# Patient Record
Sex: MALE | Race: WHITE | Employment: FULL TIME | URBAN - METROPOLITAN AREA
[De-identification: names, ages, dates, MRNs, and addresses within clinical notes are randomized per-mention and may not be internally consistent; named-entity substitution may affect disease eponyms.]

---

## 2019-08-06 ENCOUNTER — OFFICE VISIT (OUTPATIENT)
Dept: FAMILY MEDICINE CLINIC | Age: 59
End: 2019-08-06

## 2019-08-06 VITALS
BODY MASS INDEX: 41.75 KG/M2 | HEIGHT: 73 IN | HEART RATE: 79 BPM | DIASTOLIC BLOOD PRESSURE: 77 MMHG | TEMPERATURE: 98.5 F | WEIGHT: 315 LBS | OXYGEN SATURATION: 93 % | SYSTOLIC BLOOD PRESSURE: 130 MMHG | RESPIRATION RATE: 16 BRPM

## 2019-08-06 DIAGNOSIS — I10 ESSENTIAL HYPERTENSION: Primary | ICD-10-CM

## 2019-08-06 DIAGNOSIS — E11.9 DM (DIABETES MELLITUS) (HCC): Chronic | ICD-10-CM

## 2019-08-06 DIAGNOSIS — Z12.11 COLON CANCER SCREENING: ICD-10-CM

## 2019-08-06 RX ORDER — HYDROCHLOROTHIAZIDE 25 MG/1
25 TABLET ORAL DAILY
COMMUNITY

## 2019-08-06 RX ORDER — ASPIRIN 81 MG/1
81 TABLET ORAL DAILY
COMMUNITY
End: 2021-12-05

## 2019-08-06 RX ORDER — INSULIN GLARGINE 100 [IU]/ML
100 INJECTION, SOLUTION SUBCUTANEOUS DAILY
COMMUNITY
Start: 2019-08-06 | End: 2021-11-26

## 2019-08-06 RX ORDER — DILTIAZEM HYDROCHLORIDE EXTENDED-RELEASE TABLETS 240 MG/1
240 TABLET, EXTENDED RELEASE ORAL DAILY
COMMUNITY

## 2019-08-06 RX ORDER — METOPROLOL TARTRATE 50 MG/1
TABLET ORAL 2 TIMES DAILY
COMMUNITY
End: 2020-08-15

## 2019-08-06 RX ORDER — ROSUVASTATIN CALCIUM 20 MG/1
20 TABLET, COATED ORAL
COMMUNITY

## 2019-08-06 NOTE — PATIENT INSTRUCTIONS
A Healthy Lifestyle: Care Instructions Your Care Instructions A healthy lifestyle can help you feel good, stay at a healthy weight, and have plenty of energy for both work and play. A healthy lifestyle is something you can share with your whole family. A healthy lifestyle also can lower your risk for serious health problems, such as high blood pressure, heart disease, and diabetes. You can follow a few steps listed below to improve your health and the health of your family. Follow-up care is a key part of your treatment and safety. Be sure to make and go to all appointments, and call your doctor if you are having problems. It's also a good idea to know your test results and keep a list of the medicines you take. How can you care for yourself at home? · Do not eat too much sugar, fat, or fast foods. You can still have dessert and treats now and then. The goal is moderation. · Start small to improve your eating habits. Pay attention to portion sizes, drink less juice and soda pop, and eat more fruits and vegetables. ? Eat a healthy amount of food. A 3-ounce serving of meat, for example, is about the size of a deck of cards. Fill the rest of your plate with vegetables and whole grains. ? Limit the amount of soda and sports drinks you have every day. Drink more water when you are thirsty. ? Eat at least 5 servings of fruits and vegetables every day. It may seem like a lot, but it is not hard to reach this goal. A serving or helping is 1 piece of fruit, 1 cup of vegetables, or 2 cups of leafy, raw vegetables. Have an apple or some carrot sticks as an afternoon snack instead of a candy bar. Try to have fruits and/or vegetables at every meal. 
· Make exercise part of your daily routine. You may want to start with simple activities, such as walking, bicycling, or slow swimming. Try to be active 30 to 60 minutes every day.  You do not need to do all 30 to 60 minutes all at once. For example, you can exercise 3 times a day for 10 or 20 minutes. Moderate exercise is safe for most people, but it is always a good idea to talk to your doctor before starting an exercise program. 
· Keep moving. Avelina Real the lawn, work in the garden, or [place]. Take the stairs instead of the elevator at work. · If you smoke, quit. People who smoke have an increased risk for heart attack, stroke, cancer, and other lung illnesses. Quitting is hard, but there are ways to boost your chance of quitting tobacco for good. ? Use nicotine gum, patches, or lozenges. ? Ask your doctor about stop-smoking programs and medicines. ? Keep trying. In addition to reducing your risk of diseases in the future, you will notice some benefits soon after you stop using tobacco. If you have shortness of breath or asthma symptoms, they will likely get better within a few weeks after you quit. · Limit how much alcohol you drink. Moderate amounts of alcohol (up to 2 drinks a day for men, 1 drink a day for women) are okay. But drinking too much can lead to liver problems, high blood pressure, and other health problems. Family health If you have a family, there are many things you can do together to improve your health. · Eat meals together as a family as often as possible. · Eat healthy foods. This includes fruits, vegetables, lean meats and dairy, and whole grains. · Include your family in your fitness plan. Most people think of activities such as jogging or tennis as the way to fitness, but there are many ways you and your family can be more active. Anything that makes you breathe hard and gets your heart pumping is exercise. Here are some tips: 
? Walk to do errands or to take your child to school or the bus. 
? Go for a family bike ride after dinner instead of watching TV. Where can you learn more? Go to http://alondra-lionel.info/. Enter O441 in the search box to learn more about \"A Healthy Lifestyle: Care Instructions. \" Current as of: September 11, 2018 Content Version: 12.1 © 9886-8177 Healthwise, Incorporated. Care instructions adapted under license by [employer] (which disclaims liability or warranty for this information). If you have questions about a medical condition or this instruction, always ask your healthcare professional. [place] [address] any warranty or liability for your use of this information.

## 2019-08-06 NOTE — PROGRESS NOTES
Sadi Metz is a 62 y.o. male   Chief Complaint   Patient presents with    New Patient    Establish Care    pt here to [facility] and was seeing Dr Trino Durham. Pt sees endo for type 2 diabetes. Pt also treated for htn and tolerates meds fine. Pt has never had a colonoscopy and after discussion is willing to do this. Pt follows with podiatry q4 months. No complications from diabetes. he is a 62y.o. year old male who presents for evalution. Reviewed PmHx, RxHx, FmHx, SocHx, AllgHx and updated and dated in the chart. Review of Systems - negative except as listed above in the HPI    Objective:     Vitals:    08/06/19 1414   BP: 130/77   Pulse: 79   Resp: 16   Temp: 98.5 °F (36.9 °C)   TempSrc: Oral   SpO2: 93%   Weight: (!) 392 lb (177.8 kg)   Height: 6' 1\" (1.854 m)       Current Outpatient Medications   Medication Sig    dilTIAZem ER (CARDIZEM LA) 240 mg Tb24 tablet Take 240 mg by mouth daily.  metoprolol tartrate (LOPRESSOR) 50 mg tablet Take  by mouth two (2) times a day.  hydroCHLOROthiazide (HYDRODIURIL) 25 mg tablet Take 25 mg by mouth daily.  rosuvastatin (CRESTOR) 20 mg tablet Take 20 mg by mouth nightly.  aspirin delayed-release 81 mg tablet Take  by mouth daily.  insulin glargine (LANTUS,BASAGLAR) 100 unit/mL (3 mL) inpn 76 Units by SubCUTAneous route daily.  metFORMIN (GLUCOPHAGE) 1,000 mg tablet Take 1,000 mg by mouth two (2) times daily (with meals).  NOVOLOG FLEXPEN 100 unit/mL InPn 1 Units by SubCUTAneous route Before breakfast, lunch, and dinner. Sliding scale    Indications: TYPE 2 DIABETES MELLITUS     No current facility-administered medications for this visit.         Physical Examination: General appearance - alert, well appearing, and in no distress  Mental status - alert, oriented to person, place, and time  Chest - clear to auscultation, no wheezes, rales or rhonchi, symmetric air entry  Heart - normal rate, regular rhythm, normal S1, S2, no murmurs, rubs, clicks or gallops  Abdomen - soft, nontender, nondistended, no masses or organomegaly      Assessment/ Plan:   Diagnoses and all orders for this visit:    1. Essential hypertension  @ goal  2. DM (diabetes mellitus) ([facility] [address]  -     LIPID PANEL  -     METABOLIC PANEL, COMPREHENSIVE  -     HEMOGLOBIN A1C WITH EAG  -     MICROALBUMIN, UR, RAND W/ MICROALB/CREAT RATIO    3. Colon cancer screening  -     REFERRAL TO GASTROENTEROLOGY       Follow-up and Dispositions    · Return if symptoms worsen or fail to improve. I have discussed the diagnosis with the patient and the intended plan as seen in the above orders. The patient has received an after-visit summary and questions were answered concerning future plans. Pt conveyed understanding of plan.     Medication Side Effects and Warnings were discussed with patient      [address] [facility] [address], DO

## 2019-08-06 NOTE — PROGRESS NOTES
Chief Complaint   Patient presents with    New Patient   [address]     Patient presents in office today as a NP to establish care. No concerns.       Learning Assessment 8/6/2019   PRIMARY LEARNER Patient   PRIMARY LANGUAGE ENGLISH   LEARNER PREFERENCE PRIMARY LISTENING   ANSWERED BY self   RELATIONSHIP SELF

## 2019-08-08 LAB
ALBUMIN SERPL-MCNC: 4 G/DL (ref 3.5–5.5)
ALBUMIN/CREAT UR: <3.2 MG/G CREAT (ref 0–30)
ALBUMIN/GLOB SERPL: 1.4 {RATIO} (ref 1.2–2.2)
ALP SERPL-CCNC: 68 IU/L (ref 39–117)
ALT SERPL-CCNC: 24 IU/L (ref 0–44)
AST SERPL-CCNC: 19 IU/L (ref 0–40)
BILIRUB SERPL-MCNC: 0.9 MG/DL (ref 0–1.2)
BUN SERPL-MCNC: 15 MG/DL (ref 6–24)
BUN/CREAT SERPL: 15 (ref 9–20)
CALCIUM SERPL-MCNC: 8.8 MG/DL (ref 8.7–10.2)
CHLORIDE SERPL-SCNC: 100 MMOL/L (ref 96–106)
CHOLEST SERPL-MCNC: 131 MG/DL (ref 100–199)
CO2 SERPL-SCNC: 23 MMOL/L (ref 20–29)
CREAT SERPL-MCNC: 0.98 MG/DL (ref 0.76–1.27)
CREAT UR-MCNC: 94.7 MG/DL
EST. AVERAGE GLUCOSE BLD GHB EST-MCNC: 160 MG/DL
GLOBULIN SER CALC-MCNC: 2.8 G/DL (ref 1.5–4.5)
GLUCOSE SERPL-MCNC: 169 MG/DL (ref 65–99)
HBA1C MFR BLD: 7.2 % (ref 4.8–5.6)
HDLC SERPL-MCNC: 40 MG/DL
INTERPRETATION, 910389: NORMAL
LDLC SERPL CALC-MCNC: 48 MG/DL (ref 0–99)
Lab: NORMAL
MICROALBUMIN UR-MCNC: <3 UG/ML
POTASSIUM SERPL-SCNC: 3.8 MMOL/L (ref 3.5–5.2)
PROT SERPL-MCNC: 6.8 G/DL (ref 6–8.5)
SODIUM SERPL-SCNC: 140 MMOL/L (ref 134–144)
TRIGL SERPL-MCNC: 214 MG/DL (ref 0–149)
VLDLC SERPL CALC-MCNC: 43 MG/DL (ref 5–40)

## 2019-08-14 ENCOUNTER — DOCUMENTATION ONLY (OUTPATIENT)
Dept: FAMILY MEDICINE CLINIC | Age: 59
End: 2019-08-14

## 2019-08-14 NOTE — PROGRESS NOTES
Faxed 08/06/19 office note/lab results to Dr Jose Cordova per [facility] request. Fax #[phone] confirmation received.

## 2020-05-30 ENCOUNTER — APPOINTMENT (OUTPATIENT)
Dept: VASCULAR SURGERY | Age: 60
End: 2020-05-30
Attending: EMERGENCY MEDICINE
Payer: COMMERCIAL

## 2020-05-30 ENCOUNTER — HOSPITAL ENCOUNTER (EMERGENCY)
Age: 60
Discharge: HOME | End: 2020-05-30
Attending: EMERGENCY MEDICINE
Payer: COMMERCIAL

## 2020-05-30 VITALS
HEART RATE: 82 BPM | WEIGHT: 315 LBS | OXYGEN SATURATION: 96 % | HEIGHT: 74 IN | TEMPERATURE: 98.3 F | RESPIRATION RATE: 20 BRPM | DIASTOLIC BLOOD PRESSURE: 76 MMHG | SYSTOLIC BLOOD PRESSURE: 152 MMHG | BODY MASS INDEX: 40.43 KG/M2

## 2020-05-30 DIAGNOSIS — I82.4Z1 ACUTE DEEP VEIN THROMBOSIS (DVT) OF DISTAL VEIN OF RIGHT LOWER EXTREMITY (HCC): ICD-10-CM

## 2020-05-30 DIAGNOSIS — I82.4Y1 ACUTE DEEP VEIN THROMBOSIS (DVT) OF PROXIMAL VEIN OF RIGHT LOWER EXTREMITY (HCC): Primary | ICD-10-CM

## 2020-05-30 PROCEDURE — 93971 EXTREMITY STUDY: CPT

## 2020-05-30 PROCEDURE — 99282 EMERGENCY DEPT VISIT SF MDM: CPT

## 2020-05-30 RX ORDER — APIXABAN 5 MG (74)
KIT ORAL
Qty: 1 DOSE PACK | Refills: 0 | Status: SHIPPED | OUTPATIENT
Start: 2020-05-30 | End: 2020-06-29 | Stop reason: DRUGHIGH

## 2020-05-30 NOTE — DISCHARGE INSTRUCTIONS
Patient Education        Learning About Deep Vein Thrombosis  What is a deep vein thrombosis (DVT)? A deep vein thrombosis (DVT) is a blood clot (thrombus) in a deep vein, usually in the legs. A DVT can be dangerous because it can break loose and travel through the bloodstream to the lungs. There it can block blood flow in the lungs (pulmonary embolism). This can be life-threatening. What are the symptoms? Deep vein thrombosis often doesn't cause symptoms. Or it may cause only minor ones. When symptoms happen, they include:  · Swelling in the affected area. · Redness and warmth in the affected area. · Pain or tenderness. You may have pain only when you touch the affected area or when you stand or walk. Sometimes a pulmonary embolism is the first sign that you have DVT. If your doctor thinks you may have DVT, you will probably have an ultrasound test. You may have other tests as well. What causes deep vein thrombosis (DVT)? Causes of a blood clot in a deep vein (DVT) include:  · Slowed blood flow. This can happen when you're not active for long periods of time. For example, clots can form if you are paralyzed, are confined to bed, or must sit while on a long flight or car trip. · Abnormal clotting problems that make the blood clot too easily or too quickly. For example, some people have blood that clots too easily, a problem that may run in families. · Surgery or an injury to the blood vessels. Blood is more likely to clot in veins shortly after they are injured. · Cancer. How can you prevent DVT? · Exercise your lower leg muscles to help blood flow in your legs. Point your toes up toward your head so the calves of your legs are stretched, then relax and repeat. This is a good exercise to do when you are sitting for long periods of time. · Get out of bed as soon as you can after an illness or surgery. If you need to stay in bed, do the leg exercise noted above every hour when you are awake.   · Use special stockings called compression stockings. These stockings are tight at the feet with a gradually looser fit on the leg. Many doctors recommend that you wear compression stockings during a journey longer than 8 hours. · Take breaks when you are on long trips. Stop the car and walk around. On long airplane flights, walk up and down the aisle hourly, and flex and point your feet every 20 minutes while sitting. · Take blood-thinning medicines after some types of surgery if your doctor recommends it. Blood thinners also may be used if you are likely to develop clots. How is DVT treated? Treatment for DVT usually involves taking blood thinners. These medicines are given through a vein (intravenously, or IV) or as a pill. Talk with your doctor about which medicine is right for you. Your doctor also may suggest that you prop up or elevate your leg when possible, take walks, and wear compression stockings. These measures may help reduce the pain and swelling that can happen with DVT. Follow-up care is a key part of your treatment and safety. Be sure to make and go to all appointments, and call your doctor if you are having problems. It's also a good idea to know your test results and keep a list of the medicines you take. Where can you learn more? Go to http://alondra-lionel.info/  Enter X941 in the search box to learn more about \"Learning About Deep Vein Thrombosis. \"  Current as of: March 4, 2020               Content Version: 12.5  © 3316-1867 Healthwise, Incorporated. Care instructions adapted under license by [employer] (which disclaims liability or warranty for this information). If you have questions about a medical condition or this instruction, always ask your healthcare professional. [place] [address] any warranty or liability for your use of this information.

## 2020-05-31 NOTE — ED PROVIDER NOTES
62 y/o male with pmhx of diabetes, HLD, HTN presents with pain behind his right knee x 3 days. Pt notes that he usually wears compression stockings and has not for a few days, so he thinks the swelling of his leg is baseline. Pain is worse with knee flexion and walking. Tylenol helps some. Denies redness or heat of leg or knee. Denies recent trauma or falls. Denies fever, chills, chest pain, shortness of breath. No recent immobilization or surgery. Denies known arthritis.            Past Medical History:   Diagnosis Date    AR (allergic rhinitis) 2009    Diabetes ([facility] [address]     Dr. Elli Qureshi    Hypercholesterolemia     Hypertension        Past Surgical History:   Procedure Laterality Date    HX HEART CATHETERIZATION      normal         Family History:   Problem Relation Age of Onset    Heart Failure Father     Diabetes Brother     Heart Failure Paternal Grandfather          age 37 AMI       Social History     Socioeconomic History    Marital status:      Spouse name: Not on file    Number of children: Not on file    Years of education: Not on file    Highest education level: Not on file   Occupational History    Occupation:    Social Needs    Financial resource strain: Not on file    Food insecurity     Worry: Not on file     Inability: Not on file    Transportation needs     Medical: Not on file     Non-medical: Not on file   Tobacco Use    Smoking status: Former Smoker     Packs/day: 1.00     Years: 15.00     Pack years: 15.00     Types: Cigarettes, Pipe, Cigars     Last attempt to quit: 3/12/1991     Years since quittin.2    Smokeless tobacco: Never Used   Substance and Sexual Activity    Alcohol use: No    Drug use: No    Sexual activity: Not on file   Lifestyle    Physical activity     Days per week: Not on file     Minutes per session: Not on file    Stress: Not on file   Relationships    Social connections     Talks on phone: Not on file     Gets together: Not on file     Attends [employer]ism service: Not on file     Active member of club or organization: Not on file     Attends meetings of clubs or organizations: Not on file     Relationship status: Not on file    Intimate partner violence     Fear of current or ex partner: Not on file     Emotionally abused: Not on file     Physically abused: Not on file     Forced sexual activity: Not on file   Other Topics Concern     Service Not Asked    Blood Transfusions Not Asked    Caffeine Concern Not Asked    Occupational Exposure Not Asked   [place] Hazards Not Asked    Sleep Concern Not Asked    Stress Concern Not Asked    Weight Concern Not Asked    Special Diet Not Asked    Back Care Not Asked    Exercise No    Bike Helmet Not Asked    Seat Belt Not Asked    Self-Exams Not Asked   Social History Narrative    Not on file         ALLERGIES: Patient has no known allergies. Review of Systems   Constitutional: Negative for fever. HENT: Negative for congestion and sore throat. Respiratory: Negative for cough, chest tightness and shortness of breath. Cardiovascular: Positive for leg swelling (right leg). Negative for chest pain. Gastrointestinal: Negative for nausea and vomiting. Genitourinary: Negative for dysuria. Musculoskeletal: Positive for arthralgias (back of right knee). Negative for gait problem and myalgias. Skin: Negative for rash. Neurological: Negative for dizziness and weakness. Vitals:    05/30/20 1356   BP: 152/76   Pulse: 82   Resp: 20   Temp: 98.3 °F (36.8 °C)   SpO2: 96%   Weight: 158.8 kg (350 lb)   Height: 6' 2\" (1.88 m)            Physical Exam  Vitals signs and nursing note reviewed. Constitutional:       General: He is not in acute distress. Appearance: He is not ill-appearing. HENT:      Head: Normocephalic. Nose: No rhinorrhea. Mouth/Throat:      Mouth: Mucous membranes are moist.      Pharynx: Oropharynx is clear.  No posterior oropharyngeal erythema. Eyes:      Pupils: Pupils are equal, round, and reactive to light. Neck:      Musculoskeletal: Normal range of motion. Cardiovascular:      Rate and Rhythm: Normal rate and regular rhythm. Heart sounds: Normal heart sounds. Pulmonary:      Effort: Pulmonary effort is normal.      Breath sounds: Normal breath sounds. No wheezing. Abdominal:      General: There is no distension. Palpations: Abdomen is soft. Musculoskeletal:      Right knee: He exhibits no effusion, no ecchymosis and no deformity. No tenderness found. No medial joint line, no lateral joint line, no MCL, no LCL and no patellar tendon tenderness noted. Right lower le+ Edema present. Left lower le+ Edema present. Skin:     General: Skin is warm. Capillary Refill: Capillary refill takes less than 2 seconds. Findings: No erythema or rash. Neurological:      Mental Status: He is alert. Psychiatric:         Mood and Affect: Mood normal.         Behavior: Behavior normal.      Labs Reviewed - No data to display  No orders to display     Medications - No data to display    US right leg - DVT of proximal veins without good visualization of distal veins    MDM        62 y/o male with pain of posterior right knee for 3 days. Differential DVT, bakers cyst, arthritis. US identifies DVT. No signs or symptoms of PE with stable vitals. Eliquis prescribed. Discussed prompt follow-up with PCP for monitoring of condition and further anticoagulation. Discussed strict indications for return to ED such as chest pain, shortness of breath, syncope, presyncope, worsening pain, fever. Case reviewed with attending physician, Dr. Isa Edmond.     Procedures    Jeanne Perez PA-C  2020

## 2020-06-04 ENCOUNTER — TELEPHONE (OUTPATIENT)
Dept: FAMILY MEDICINE CLINIC | Age: 60
End: 2020-06-04

## 2020-06-04 NOTE — TELEPHONE ENCOUNTER
Wife Alysa Carlos on hippa is real concern about patient. He had a previous virtual appt but cell phone would not work right. He was seen at [facility] ER for a blood clot & now on eliquis. Patient has a swollen calf with discoloration. She states that patient is very stubborn but she wants to know what to do. No virtual appt available. They have plans for beach vacation next week. Do you want him to go through flu clinic this afternoon?  Please call Alysa Carlos

## 2020-06-04 NOTE — TELEPHONE ENCOUNTER
Called and spoke with wife. She states that patient had multiple blood clots in his right calf and has been taking Eliquis and using his compression stocking. She reports that his right calf is still very swollen and painful and warm to the touch. Also reports red streaks. Advised wife that he should go to the ED for repeat imaging. Wife verbalized understanding.

## 2020-06-29 ENCOUNTER — TELEPHONE (OUTPATIENT)
Dept: FAMILY MEDICINE CLINIC | Age: 60
End: 2020-06-29

## 2020-06-29 NOTE — TELEPHONE ENCOUNTER
Wife Cassia Power states that patient took his last Eliquis pill this morning. He takes 1 in the morning & 1 @ night now. Can you send in a rx & he will make a virtual appt with Dr Crystal Hinojosa next week?  Let her know so she can  rx

## 2020-07-07 ENCOUNTER — VIRTUAL VISIT (OUTPATIENT)
Dept: FAMILY MEDICINE CLINIC | Age: 60
End: 2020-07-07

## 2020-07-07 DIAGNOSIS — I82.411 ACUTE DEEP VEIN THROMBOSIS (DVT) OF FEMORAL VEIN OF RIGHT LOWER EXTREMITY (HCC): Primary | ICD-10-CM

## 2020-07-07 NOTE — PROGRESS NOTES
Progress Note    he is a 61y.o. year old male who presents for evalution. Subjective:     Pt for follow up from ED in May, states he got a cram pin his calf and would not go away and wife looked at it and was red and hot. Was dx with DVT proximal femoral vein on R side. Pt thinks maybe his compression hose may have contributed but unlikely. He denies recent travel but works in a mostly sedentary job at a computer. Pt states he is taking the eliquis bid but is getting diarrhea. Pt wants to remain on this for now discussed we could switch to xarelto. Pt would like to have order for repeat doppler for October. No sob  Reviewed PmHx, RxHx, FmHx, SocHx, AllgHx and updated and dated in the chart. Review of Systems - negative except as listed above in the HPI    Objective: There were no vitals filed for this visit. Current Outpatient Medications   Medication Sig    apixaban (ELIQUIS) 5 mg tablet Take 1 Tab by mouth two (2) times a day.  dilTIAZem ER (CARDIZEM LA) 240 mg Tb24 tablet Take 240 mg by mouth daily.  metoprolol tartrate (LOPRESSOR) 50 mg tablet Take  by mouth two (2) times a day.  hydroCHLOROthiazide (HYDRODIURIL) 25 mg tablet Take 25 mg by mouth daily.  rosuvastatin (CRESTOR) 20 mg tablet Take 20 mg by mouth nightly.  aspirin delayed-release 81 mg tablet Take  by mouth daily.  insulin glargine (LANTUS,BASAGLAR) 100 unit/mL (3 mL) inpn 76 Units by SubCUTAneous route daily.  metFORMIN (GLUCOPHAGE) 1,000 mg tablet Take 1,000 mg by mouth two (2) times daily (with meals).  NOVOLOG FLEXPEN 100 unit/mL InPn 1 Units by SubCUTAneous route Before breakfast, lunch, and dinner. Sliding scale    Indications: TYPE 2 DIABETES MELLITUS     No current facility-administered medications for this visit.         Physical Examination: General appearance - alert, well appearing, and in no distress  Mental status - alert, oriented to person, place, and time  Chest - gilmar resp effort      Assessment/ Plan:   Diagnoses and all orders for this visit:    1. Acute deep vein thrombosis (DVT) of femoral vein of right lower extremity (HCC)  -     DUPLEX LOWER EXT VENOUS RIGHT; Future       Follow-up and Dispositions    · Return if symptoms worsen or fail to improve. I have discussed the diagnosis with the patient and the intended plan as seen in the above orders. The patient has received an after-visit summary and questions were answered concerning future plans. Pt conveyed understanding of plan. Medication Side Effects and Warnings were discussed with patient      Rashida España DO         Rj Atkinson is a 61 y.o. male being evaluated by a Virtual Visit (video visit) encounter to address concerns as mentioned above. A caregiver was present when appropriate. Due to this being a TeleHealth encounter (During XIZ-12 public health emergency), evaluation of the following organ systems was limited: Vitals/Constitutional/EENT/Resp/CV/GI//MS/Neuro/Skin/Heme-Lymph-Imm. Pursuant to the emergency declaration under the [address] [place] [address], [address] [place] [address] authority and the [employer] Act, this Virtual Visit was conducted with patient's (and/or legal guardian's) consent, to reduce the risk of exposure to COVID-19 and provide necessary medical care. Services were provided through a video synchronous discussion virtually to substitute for in-person encounter. --Rashida España DO on 7/7/2020 at 2:09 PM    An electronic signature was used to authenticate this note.

## 2020-07-07 NOTE — PATIENT INSTRUCTIONS
A Healthy Lifestyle: Care Instructions Your Care Instructions A healthy lifestyle can help you feel good, stay at a healthy weight, and have plenty of energy for both work and play. A healthy lifestyle is something you can share with your whole family. A healthy lifestyle also can lower your risk for serious health problems, such as high blood pressure, heart disease, and diabetes. You can follow a few steps listed below to improve your health and the health of your family. Follow-up care is a key part of your treatment and safety. Be sure to make and go to all appointments, and call your doctor if you are having problems. It's also a good idea to know your test results and keep a list of the medicines you take. How can you care for yourself at home? · Do not eat too much sugar, fat, or fast foods. You can still have dessert and treats now and then. The goal is moderation. · Start small to improve your eating habits. Pay attention to portion sizes, drink less juice and soda pop, and eat more fruits and vegetables. ? Eat a healthy amount of food. A 3-ounce serving of meat, for example, is about the size of a deck of cards. Fill the rest of your plate with vegetables and whole grains. ? Limit the amount of soda and sports drinks you have every day. Drink more water when you are thirsty. ? Eat at least 5 servings of fruits and vegetables every day. It may seem like a lot, but it is not hard to reach this goal. A serving or helping is 1 piece of fruit, 1 cup of vegetables, or 2 cups of leafy, raw vegetables. Have an apple or some carrot sticks as an afternoon snack instead of a candy bar. Try to have fruits and/or vegetables at every meal. 
· Make exercise part of your daily routine. You may want to start with simple activities, such as walking, bicycling, or slow swimming. Try to be active 30 to 60 minutes every day.  You do not need to do all 30 to 60 minutes all at once. For example, you can exercise 3 times a day for 10 or 20 minutes. Moderate exercise is safe for most people, but it is always a good idea to talk to your doctor before starting an exercise program. 
· Keep moving. [employer] the lawn, work in the garden, or [employer]. Take the stairs instead of the elevator at work. · If you smoke, quit. People who smoke have an increased risk for heart attack, stroke, cancer, and other lung illnesses. Quitting is hard, but there are ways to boost your chance of quitting tobacco for good. ? Use nicotine gum, patches, or lozenges. ? Ask your doctor about stop-smoking programs and medicines. ? Keep trying. In addition to reducing your risk of diseases in the future, you will notice some benefits soon after you stop using tobacco. If you have shortness of breath or asthma symptoms, they will likely get better within a few weeks after you quit. · Limit how much alcohol you drink. Moderate amounts of alcohol (up to 2 drinks a day for men, 1 drink a day for women) are okay. But drinking too much can lead to liver problems, high blood pressure, and other health problems. Family health If you have a family, there are many things you can do together to improve your health. · Eat meals together as a family as often as possible. · Eat healthy foods. This includes fruits, vegetables, lean meats and dairy, and whole grains. · Include your family in your fitness plan. Most people think of activities such as jogging or tennis as the way to fitness, but there are many ways you and your family can be more active. Anything that makes you breathe hard and gets your heart pumping is exercise. Here are some tips: 
? Walk to do errands or to take your child to school or the bus. 
? Go for a family bike ride after dinner instead of watching TV. Where can you learn more? Go to http://alondra-lionel.info/ Enter I245 in the search box to learn more about \"A Healthy Lifestyle: Care Instructions. \" Current as of: January 31, 2020               Content Version: 12.5 © 4738-1249 [employer]. Care instructions adapted under license by [employer] (which disclaims liability or warranty for this information). If you have questions about a medical condition or this instruction, always ask your healthcare professional. [place] [address] any warranty or liability for your use of this information.

## 2020-08-15 ENCOUNTER — APPOINTMENT (OUTPATIENT)
Dept: CT IMAGING | Age: 60
DRG: 872 | End: 2020-08-15
Attending: EMERGENCY MEDICINE
Payer: COMMERCIAL

## 2020-08-15 ENCOUNTER — HOSPITAL ENCOUNTER (INPATIENT)
Age: 60
LOS: 3 days | Discharge: HOME | DRG: 872 | End: 2020-08-18
Attending: EMERGENCY MEDICINE | Admitting: FAMILY MEDICINE
Payer: COMMERCIAL

## 2020-08-15 ENCOUNTER — APPOINTMENT (OUTPATIENT)
Dept: GENERAL RADIOLOGY | Age: 60
DRG: 872 | End: 2020-08-15
Attending: EMERGENCY MEDICINE
Payer: COMMERCIAL

## 2020-08-15 DIAGNOSIS — R78.81 POSITIVE BLOOD CULTURE: ICD-10-CM

## 2020-08-15 DIAGNOSIS — E78.5 HYPERLIPIDEMIA, UNSPECIFIED HYPERLIPIDEMIA TYPE: Chronic | ICD-10-CM

## 2020-08-15 DIAGNOSIS — Z79.4 TYPE 2 DIABETES MELLITUS WITHOUT COMPLICATION, WITH LONG-TERM CURRENT USE OF INSULIN (HCC): Chronic | ICD-10-CM

## 2020-08-15 DIAGNOSIS — E87.20 LACTIC ACIDOSIS: ICD-10-CM

## 2020-08-15 DIAGNOSIS — R50.9 FEVER, UNSPECIFIED FEVER CAUSE: Primary | ICD-10-CM

## 2020-08-15 DIAGNOSIS — I10 ESSENTIAL HYPERTENSION: Chronic | ICD-10-CM

## 2020-08-15 DIAGNOSIS — A41.9 SEVERE SEPSIS (HCC): ICD-10-CM

## 2020-08-15 DIAGNOSIS — R65.10 SIRS (SYSTEMIC INFLAMMATORY RESPONSE SYNDROME) (HCC): ICD-10-CM

## 2020-08-15 DIAGNOSIS — R78.81 STREPTOCOCCAL BACTEREMIA: ICD-10-CM

## 2020-08-15 DIAGNOSIS — B95.5 STREPTOCOCCAL BACTEREMIA: ICD-10-CM

## 2020-08-15 DIAGNOSIS — I82.511 CHRONIC DEEP VEIN THROMBOSIS (DVT) OF FEMORAL VEIN OF RIGHT LOWER EXTREMITY (HCC): ICD-10-CM

## 2020-08-15 DIAGNOSIS — E11.9 TYPE 2 DIABETES MELLITUS WITHOUT COMPLICATION, WITH LONG-TERM CURRENT USE OF INSULIN (HCC): Chronic | ICD-10-CM

## 2020-08-15 DIAGNOSIS — R65.20 SEVERE SEPSIS (HCC): ICD-10-CM

## 2020-08-15 DIAGNOSIS — L03.116 CELLULITIS OF LEFT LOWER EXTREMITY: ICD-10-CM

## 2020-08-15 LAB
ALBUMIN SERPL-MCNC: 3.5 G/DL (ref 3.5–5)
ALBUMIN/GLOB SERPL: 0.9 {RATIO} (ref 1.1–2.2)
ALP SERPL-CCNC: 65 U/L (ref 45–117)
ALT SERPL-CCNC: 26 U/L (ref 12–78)
ANION GAP SERPL CALC-SCNC: 11 MMOL/L (ref 5–15)
APPEARANCE UR: CLEAR
AST SERPL-CCNC: 10 U/L (ref 15–37)
B PERT DNA SPEC QL NAA+PROBE: NOT DETECTED
BACTERIA URNS QL MICRO: NEGATIVE /HPF
BASOPHILS # BLD: 0 K/UL (ref 0–0.1)
BASOPHILS NFR BLD: 0 % (ref 0–1)
BILIRUB SERPL-MCNC: 2.4 MG/DL (ref 0.2–1)
BILIRUB UR QL: NEGATIVE
BORDETELLA PARAPERTUSSIS PCR, BORPAR: NOT DETECTED
BUN SERPL-MCNC: 19 MG/DL (ref 6–20)
BUN/CREAT SERPL: 13 (ref 12–20)
C PNEUM DNA SPEC QL NAA+PROBE: NOT DETECTED
CALCIUM SERPL-MCNC: 8.5 MG/DL (ref 8.5–10.1)
CHLORIDE SERPL-SCNC: 97 MMOL/L (ref 97–108)
CHOLEST SERPL-MCNC: 112 MG/DL
CO2 SERPL-SCNC: 23 MMOL/L (ref 21–32)
COLOR UR: ABNORMAL
COMMENT, HOLDF: NORMAL
COVID-19 RAPID TEST, COVR: ABNORMAL
COVID-19 RAPID TEST, COVR: NOT DETECTED
CREAT SERPL-MCNC: 1.42 MG/DL (ref 0.7–1.3)
CRP SERPL-MCNC: 8.47 MG/DL (ref 0–0.6)
D DIMER PPP FEU-MCNC: 0.37 MG/L FEU (ref 0–0.65)
DIFFERENTIAL METHOD BLD: ABNORMAL
EOSINOPHIL # BLD: 0 K/UL (ref 0–0.4)
EOSINOPHIL NFR BLD: 0 % (ref 0–7)
EPITH CASTS URNS QL MICRO: ABNORMAL /LPF
ERYTHROCYTE [DISTWIDTH] IN BLOOD BY AUTOMATED COUNT: 15.7 % (ref 11.5–14.5)
EST. AVERAGE GLUCOSE BLD GHB EST-MCNC: 174 MG/DL
FERRITIN SERPL-MCNC: 74 NG/ML (ref 26–388)
FLUAV H1 2009 PAND RNA SPEC QL NAA+PROBE: NOT DETECTED
FLUAV H1 RNA SPEC QL NAA+PROBE: NOT DETECTED
FLUAV H3 RNA SPEC QL NAA+PROBE: NOT DETECTED
FLUAV SUBTYP SPEC NAA+PROBE: NOT DETECTED
FLUBV RNA SPEC QL NAA+PROBE: NOT DETECTED
GLOBULIN SER CALC-MCNC: 3.8 G/DL (ref 2–4)
GLUCOSE BLD STRIP.AUTO-MCNC: 130 MG/DL (ref 65–100)
GLUCOSE BLD STRIP.AUTO-MCNC: 268 MG/DL (ref 65–100)
GLUCOSE BLD STRIP.AUTO-MCNC: 352 MG/DL (ref 65–100)
GLUCOSE BLD STRIP.AUTO-MCNC: 400 MG/DL (ref 65–100)
GLUCOSE BLD STRIP.AUTO-MCNC: 409 MG/DL (ref 65–100)
GLUCOSE SERPL-MCNC: 367 MG/DL (ref 65–100)
GLUCOSE UR STRIP.AUTO-MCNC: >1000 MG/DL
HADV DNA SPEC QL NAA+PROBE: NOT DETECTED
HBA1C MFR BLD: 7.7 % (ref 4–5.6)
HCOV 229E RNA SPEC QL NAA+PROBE: NOT DETECTED
HCOV HKU1 RNA SPEC QL NAA+PROBE: NOT DETECTED
HCOV NL63 RNA SPEC QL NAA+PROBE: NOT DETECTED
HCOV OC43 RNA SPEC QL NAA+PROBE: NOT DETECTED
HCT VFR BLD AUTO: 40.2 % (ref 36.6–50.3)
HDLC SERPL-MCNC: 43 MG/DL
HDLC SERPL: 2.6 {RATIO} (ref 0–5)
HEALTH STATUS, XMCV2T: ABNORMAL
HEALTH STATUS, XMCV2T: NORMAL
HGB BLD-MCNC: 13.5 G/DL (ref 12.1–17)
HGB UR QL STRIP: ABNORMAL
HMPV RNA SPEC QL NAA+PROBE: NOT DETECTED
HPIV1 RNA SPEC QL NAA+PROBE: NOT DETECTED
HPIV2 RNA SPEC QL NAA+PROBE: NOT DETECTED
HPIV3 RNA SPEC QL NAA+PROBE: NOT DETECTED
HPIV4 RNA SPEC QL NAA+PROBE: NOT DETECTED
HYALINE CASTS URNS QL MICRO: ABNORMAL /LPF (ref 0–5)
IMM GRANULOCYTES # BLD AUTO: 0.1 K/UL (ref 0–0.04)
IMM GRANULOCYTES NFR BLD AUTO: 0 % (ref 0–0.5)
KETONES UR QL STRIP.AUTO: 80 MG/DL
LACTATE SERPL-SCNC: 2.7 MMOL/L (ref 0.4–2)
LACTATE SERPL-SCNC: 2.8 MMOL/L (ref 0.4–2)
LACTATE SERPL-SCNC: 3 MMOL/L (ref 0.4–2)
LACTATE SERPL-SCNC: 3.6 MMOL/L (ref 0.4–2)
LDH SERPL L TO P-CCNC: 202 U/L (ref 85–241)
LDLC SERPL CALC-MCNC: 34.6 MG/DL (ref 0–100)
LEUKOCYTE ESTERASE UR QL STRIP.AUTO: NEGATIVE
LIPID PROFILE,FLP: ABNORMAL
LYMPHOCYTES # BLD: 0.9 K/UL (ref 0.8–3.5)
LYMPHOCYTES NFR BLD: 5 % (ref 12–49)
M PNEUMO DNA SPEC QL NAA+PROBE: NOT DETECTED
MAGNESIUM SERPL-MCNC: 2 MG/DL (ref 1.6–2.4)
MCH RBC QN AUTO: 28.7 PG (ref 26–34)
MCHC RBC AUTO-ENTMCNC: 33.6 G/DL (ref 30–36.5)
MCV RBC AUTO: 85.5 FL (ref 80–99)
MONOCYTES # BLD: 1.3 K/UL (ref 0–1)
MONOCYTES NFR BLD: 8 % (ref 5–13)
NEUTS SEG # BLD: 14.7 K/UL (ref 1.8–8)
NEUTS SEG NFR BLD: 87 % (ref 32–75)
NITRITE UR QL STRIP.AUTO: NEGATIVE
NRBC # BLD: 0 K/UL (ref 0–0.01)
NRBC BLD-RTO: 0 PER 100 WBC
PH UR STRIP: 5.5 [PH] (ref 5–8)
PLATELET # BLD AUTO: 209 K/UL (ref 150–400)
PMV BLD AUTO: 10.5 FL (ref 8.9–12.9)
POTASSIUM SERPL-SCNC: 3.7 MMOL/L (ref 3.5–5.1)
PROT SERPL-MCNC: 7.3 G/DL (ref 6.4–8.2)
PROT UR STRIP-MCNC: NEGATIVE MG/DL
RBC # BLD AUTO: 4.7 M/UL (ref 4.1–5.7)
RBC #/AREA URNS HPF: ABNORMAL /HPF (ref 0–5)
RSV RNA SPEC QL NAA+PROBE: NOT DETECTED
RV+EV RNA SPEC QL NAA+PROBE: NOT DETECTED
SAMPLES BEING HELD,HOLD: NORMAL
SERVICE CMNT-IMP: ABNORMAL
SODIUM SERPL-SCNC: 131 MMOL/L (ref 136–145)
SOURCE, COVRS: ABNORMAL
SOURCE, COVRS: NORMAL
SP GR UR REFRACTOMETRY: 1.02 (ref 1–1.03)
SPECIMEN SOURCE, FCOV2M: ABNORMAL
SPECIMEN SOURCE, FCOV2M: NORMAL
SPECIMEN TYPE, XMCV1T: ABNORMAL
SPECIMEN TYPE, XMCV1T: NORMAL
TRIGL SERPL-MCNC: 172 MG/DL (ref ?–150)
UA: UC IF INDICATED,UAUC: ABNORMAL
UROBILINOGEN UR QL STRIP.AUTO: 0.2 EU/DL (ref 0.2–1)
VLDLC SERPL CALC-MCNC: 34.4 MG/DL
WBC # BLD AUTO: 16.9 K/UL (ref 4.1–11.1)
WBC URNS QL MICRO: ABNORMAL /HPF (ref 0–4)

## 2020-08-15 PROCEDURE — 74011636637 HC RX REV CODE- 636/637: Performed by: STUDENT IN AN ORGANIZED HEALTH CARE EDUCATION/TRAINING PROGRAM

## 2020-08-15 PROCEDURE — 96365 THER/PROPH/DIAG IV INF INIT: CPT

## 2020-08-15 PROCEDURE — 81001 URINALYSIS AUTO W/SCOPE: CPT

## 2020-08-15 PROCEDURE — 36415 COLL VENOUS BLD VENIPUNCTURE: CPT

## 2020-08-15 PROCEDURE — 83605 ASSAY OF LACTIC ACID: CPT

## 2020-08-15 PROCEDURE — 71045 X-RAY EXAM CHEST 1 VIEW: CPT

## 2020-08-15 PROCEDURE — 86140 C-REACTIVE PROTEIN: CPT

## 2020-08-15 PROCEDURE — 74011636320 HC RX REV CODE- 636/320: Performed by: RADIOLOGY

## 2020-08-15 PROCEDURE — 71275 CT ANGIOGRAPHY CHEST: CPT

## 2020-08-15 PROCEDURE — 80061 LIPID PANEL: CPT

## 2020-08-15 PROCEDURE — 87086 URINE CULTURE/COLONY COUNT: CPT

## 2020-08-15 PROCEDURE — 80053 COMPREHEN METABOLIC PANEL: CPT

## 2020-08-15 PROCEDURE — 82728 ASSAY OF FERRITIN: CPT

## 2020-08-15 PROCEDURE — 0100U RESPIRATORY PANEL,PCR,NASOPHARYNGEAL: CPT

## 2020-08-15 PROCEDURE — 87077 CULTURE AEROBIC IDENTIFY: CPT

## 2020-08-15 PROCEDURE — 74011000250 HC RX REV CODE- 250: Performed by: EMERGENCY MEDICINE

## 2020-08-15 PROCEDURE — 87635 SARS-COV-2 COVID-19 AMP PRB: CPT

## 2020-08-15 PROCEDURE — 74011250637 HC RX REV CODE- 250/637: Performed by: STUDENT IN AN ORGANIZED HEALTH CARE EDUCATION/TRAINING PROGRAM

## 2020-08-15 PROCEDURE — 87899 AGENT NOS ASSAY W/OPTIC: CPT

## 2020-08-15 PROCEDURE — 93005 ELECTROCARDIOGRAM TRACING: CPT

## 2020-08-15 PROCEDURE — 83735 ASSAY OF MAGNESIUM: CPT

## 2020-08-15 PROCEDURE — 65660000000 HC RM CCU STEPDOWN

## 2020-08-15 PROCEDURE — 87449 NOS EACH ORGANISM AG IA: CPT

## 2020-08-15 PROCEDURE — 87040 BLOOD CULTURE FOR BACTERIA: CPT

## 2020-08-15 PROCEDURE — 96375 TX/PRO/DX INJ NEW DRUG ADDON: CPT

## 2020-08-15 PROCEDURE — 83615 LACTATE (LD) (LDH) ENZYME: CPT

## 2020-08-15 PROCEDURE — 85379 FIBRIN DEGRADATION QUANT: CPT

## 2020-08-15 PROCEDURE — 87186 SC STD MICRODIL/AGAR DIL: CPT

## 2020-08-15 PROCEDURE — 83520 IMMUNOASSAY QUANT NOS NONAB: CPT

## 2020-08-15 PROCEDURE — 99223 1ST HOSP IP/OBS HIGH 75: CPT | Performed by: FAMILY MEDICINE

## 2020-08-15 PROCEDURE — 74011000258 HC RX REV CODE- 258: Performed by: STUDENT IN AN ORGANIZED HEALTH CARE EDUCATION/TRAINING PROGRAM

## 2020-08-15 PROCEDURE — 74011250636 HC RX REV CODE- 250/636: Performed by: STUDENT IN AN ORGANIZED HEALTH CARE EDUCATION/TRAINING PROGRAM

## 2020-08-15 PROCEDURE — 83036 HEMOGLOBIN GLYCOSYLATED A1C: CPT

## 2020-08-15 PROCEDURE — 87147 CULTURE TYPE IMMUNOLOGIC: CPT

## 2020-08-15 PROCEDURE — 74011250636 HC RX REV CODE- 250/636: Performed by: EMERGENCY MEDICINE

## 2020-08-15 PROCEDURE — 96361 HYDRATE IV INFUSION ADD-ON: CPT

## 2020-08-15 PROCEDURE — 74177 CT ABD & PELVIS W/CONTRAST: CPT

## 2020-08-15 PROCEDURE — 82962 GLUCOSE BLOOD TEST: CPT

## 2020-08-15 PROCEDURE — 85025 COMPLETE CBC W/AUTO DIFF WBC: CPT

## 2020-08-15 PROCEDURE — 99285 EMERGENCY DEPT VISIT HI MDM: CPT

## 2020-08-15 RX ORDER — INSULIN LISPRO 100 [IU]/ML
INJECTION, SOLUTION INTRAVENOUS; SUBCUTANEOUS
Status: DISCONTINUED | OUTPATIENT
Start: 2020-08-15 | End: 2020-08-18 | Stop reason: HOSPADM

## 2020-08-15 RX ORDER — MAGNESIUM SULFATE 100 %
4 CRYSTALS MISCELLANEOUS AS NEEDED
Status: DISCONTINUED | OUTPATIENT
Start: 2020-08-15 | End: 2020-08-18 | Stop reason: HOSPADM

## 2020-08-15 RX ORDER — ASCORBIC ACID 500 MG
500 TABLET ORAL 2 TIMES DAILY
Status: DISCONTINUED | OUTPATIENT
Start: 2020-08-15 | End: 2020-08-18 | Stop reason: HOSPADM

## 2020-08-15 RX ORDER — DILTIAZEM HYDROCHLORIDE 240 MG/1
240 CAPSULE, COATED, EXTENDED RELEASE ORAL DAILY
Status: DISCONTINUED | OUTPATIENT
Start: 2020-08-16 | End: 2020-08-18 | Stop reason: HOSPADM

## 2020-08-15 RX ORDER — METOPROLOL SUCCINATE 50 MG/1
50 TABLET, EXTENDED RELEASE ORAL DAILY
Status: DISCONTINUED | OUTPATIENT
Start: 2020-08-16 | End: 2020-08-18 | Stop reason: HOSPADM

## 2020-08-15 RX ORDER — LEVOFLOXACIN 5 MG/ML
750 INJECTION, SOLUTION INTRAVENOUS EVERY 24 HOURS
Status: DISCONTINUED | OUTPATIENT
Start: 2020-08-15 | End: 2020-08-15 | Stop reason: ALTCHOICE

## 2020-08-15 RX ORDER — INSULIN GLARGINE 100 [IU]/ML
60 INJECTION, SOLUTION SUBCUTANEOUS
Status: DISCONTINUED | OUTPATIENT
Start: 2020-08-15 | End: 2020-08-15

## 2020-08-15 RX ORDER — INSULIN GLARGINE 100 [IU]/ML
76 INJECTION, SOLUTION SUBCUTANEOUS
Status: DISCONTINUED | OUTPATIENT
Start: 2020-08-15 | End: 2020-08-18 | Stop reason: HOSPADM

## 2020-08-15 RX ORDER — HYDROCHLOROTHIAZIDE 25 MG/1
25 TABLET ORAL DAILY
Status: DISCONTINUED | OUTPATIENT
Start: 2020-08-16 | End: 2020-08-18 | Stop reason: HOSPADM

## 2020-08-15 RX ORDER — INSULIN LISPRO 100 [IU]/ML
INJECTION, SOLUTION INTRAVENOUS; SUBCUTANEOUS
COMMUNITY

## 2020-08-15 RX ORDER — INSULIN LISPRO 100 [IU]/ML
48 INJECTION, SOLUTION INTRAVENOUS; SUBCUTANEOUS
Status: DISCONTINUED | OUTPATIENT
Start: 2020-08-15 | End: 2020-08-15

## 2020-08-15 RX ORDER — ATORVASTATIN CALCIUM 20 MG/1
20 TABLET, FILM COATED ORAL
Status: DISCONTINUED | OUTPATIENT
Start: 2020-08-15 | End: 2020-08-18 | Stop reason: HOSPADM

## 2020-08-15 RX ORDER — SODIUM CHLORIDE 0.9 % (FLUSH) 0.9 %
5-10 SYRINGE (ML) INJECTION AS NEEDED
Status: DISCONTINUED | OUTPATIENT
Start: 2020-08-15 | End: 2020-08-18 | Stop reason: HOSPADM

## 2020-08-15 RX ORDER — INSULIN LISPRO 100 [IU]/ML
60 INJECTION, SOLUTION INTRAVENOUS; SUBCUTANEOUS
Status: DISCONTINUED | OUTPATIENT
Start: 2020-08-15 | End: 2020-08-18 | Stop reason: HOSPADM

## 2020-08-15 RX ORDER — DEXTROSE 50 % IN WATER (D50W) INTRAVENOUS SYRINGE
25-50 AS NEEDED
Status: DISCONTINUED | OUTPATIENT
Start: 2020-08-15 | End: 2020-08-18 | Stop reason: HOSPADM

## 2020-08-15 RX ORDER — ZINC SULFATE 50(220)MG
1 CAPSULE ORAL DAILY
Status: DISCONTINUED | OUTPATIENT
Start: 2020-08-15 | End: 2020-08-18 | Stop reason: HOSPADM

## 2020-08-15 RX ORDER — ACETAMINOPHEN 325 MG/1
650 TABLET ORAL
Status: DISCONTINUED | OUTPATIENT
Start: 2020-08-15 | End: 2020-08-18 | Stop reason: HOSPADM

## 2020-08-15 RX ORDER — SODIUM CHLORIDE 0.9 % (FLUSH) 0.9 %
5-40 SYRINGE (ML) INJECTION AS NEEDED
Status: DISCONTINUED | OUTPATIENT
Start: 2020-08-15 | End: 2020-08-18 | Stop reason: HOSPADM

## 2020-08-15 RX ORDER — METOPROLOL SUCCINATE 50 MG/1
50 TABLET, EXTENDED RELEASE ORAL DAILY
COMMUNITY

## 2020-08-15 RX ORDER — ACETAMINOPHEN 650 MG/1
650 SUPPOSITORY RECTAL
Status: DISCONTINUED | OUTPATIENT
Start: 2020-08-15 | End: 2020-08-18 | Stop reason: HOSPADM

## 2020-08-15 RX ORDER — INSULIN LISPRO 100 [IU]/ML
INJECTION, SOLUTION INTRAVENOUS; SUBCUTANEOUS
Status: DISCONTINUED | OUTPATIENT
Start: 2020-08-15 | End: 2020-08-15

## 2020-08-15 RX ORDER — INSULIN LISPRO 100 [IU]/ML
12 INJECTION, SOLUTION INTRAVENOUS; SUBCUTANEOUS ONCE
Status: COMPLETED | OUTPATIENT
Start: 2020-08-15 | End: 2020-08-15

## 2020-08-15 RX ORDER — INSULIN GLARGINE 100 [IU]/ML
76 INJECTION, SOLUTION SUBCUTANEOUS
Status: DISCONTINUED | OUTPATIENT
Start: 2020-08-15 | End: 2020-08-15

## 2020-08-15 RX ORDER — SODIUM CHLORIDE 0.9 % (FLUSH) 0.9 %
5-40 SYRINGE (ML) INJECTION EVERY 8 HOURS
Status: DISCONTINUED | OUTPATIENT
Start: 2020-08-15 | End: 2020-08-18 | Stop reason: HOSPADM

## 2020-08-15 RX ORDER — METRONIDAZOLE 500 MG/100ML
500 INJECTION, SOLUTION INTRAVENOUS EVERY 12 HOURS
Status: DISCONTINUED | OUTPATIENT
Start: 2020-08-15 | End: 2020-08-18 | Stop reason: HOSPADM

## 2020-08-15 RX ADMIN — WATER 2 G: 1 INJECTION INTRAMUSCULAR; INTRAVENOUS; SUBCUTANEOUS at 14:51

## 2020-08-15 RX ADMIN — Medication 500 MG: at 17:16

## 2020-08-15 RX ADMIN — Medication 500 MG: at 10:08

## 2020-08-15 RX ADMIN — SODIUM CHLORIDE 1000 ML: 900 INJECTION, SOLUTION INTRAVENOUS at 10:11

## 2020-08-15 RX ADMIN — Medication 10 ML: at 17:17

## 2020-08-15 RX ADMIN — INSULIN LISPRO 12 UNITS: 100 INJECTION, SOLUTION INTRAVENOUS; SUBCUTANEOUS at 14:49

## 2020-08-15 RX ADMIN — ZINC SULFATE 220 MG (50 MG) CAPSULE 1 CAPSULE: CAPSULE at 10:08

## 2020-08-15 RX ADMIN — IOPAMIDOL 100 ML: 755 INJECTION, SOLUTION INTRAVENOUS at 06:01

## 2020-08-15 RX ADMIN — SODIUM CHLORIDE 1000 ML: 900 INJECTION, SOLUTION INTRAVENOUS at 05:09

## 2020-08-15 RX ADMIN — DOXYCYCLINE 100 MG: 100 INJECTION, POWDER, LYOPHILIZED, FOR SOLUTION INTRAVENOUS at 12:33

## 2020-08-15 RX ADMIN — ACETAMINOPHEN 650 MG: 325 TABLET ORAL at 21:52

## 2020-08-15 RX ADMIN — Medication 5 ML: at 13:43

## 2020-08-15 RX ADMIN — METRONIDAZOLE 500 MG: 500 INJECTION, SOLUTION INTRAVENOUS at 10:13

## 2020-08-15 RX ADMIN — ATORVASTATIN CALCIUM 20 MG: 20 TABLET, FILM COATED ORAL at 21:52

## 2020-08-15 RX ADMIN — APIXABAN 5 MG: 5 TABLET, FILM COATED ORAL at 17:16

## 2020-08-15 RX ADMIN — INSULIN GLARGINE 76 UNITS: 100 INJECTION, SOLUTION SUBCUTANEOUS at 21:52

## 2020-08-15 RX ADMIN — SODIUM CHLORIDE 1000 ML: 900 INJECTION, SOLUTION INTRAVENOUS at 03:44

## 2020-08-15 RX ADMIN — CEFEPIME HYDROCHLORIDE 2 G: 2 INJECTION, POWDER, FOR SOLUTION INTRAVENOUS at 21:53

## 2020-08-15 RX ADMIN — WATER 2 G: 1 INJECTION INTRAMUSCULAR; INTRAVENOUS; SUBCUTANEOUS at 06:50

## 2020-08-15 RX ADMIN — APIXABAN 5 MG: 5 TABLET, FILM COATED ORAL at 10:08

## 2020-08-15 RX ADMIN — INSULIN LISPRO 7 UNITS: 100 INJECTION, SOLUTION INTRAVENOUS; SUBCUTANEOUS at 17:16

## 2020-08-15 RX ADMIN — LEVOFLOXACIN 750 MG: 5 INJECTION, SOLUTION INTRAVENOUS at 06:01

## 2020-08-15 RX ADMIN — INSULIN LISPRO 48 UNITS: 100 INJECTION, SOLUTION INTRAVENOUS; SUBCUTANEOUS at 11:30

## 2020-08-15 RX ADMIN — INSULIN LISPRO 60 UNITS: 100 INJECTION, SOLUTION INTRAVENOUS; SUBCUTANEOUS at 17:16

## 2020-08-15 RX ADMIN — VANCOMYCIN HYDROCHLORIDE 2500 MG: 10 INJECTION, POWDER, LYOPHILIZED, FOR SOLUTION INTRAVENOUS at 13:40

## 2020-08-15 NOTE — ED NOTES
Pt . Dr. Sonal Francis notified. Give 48 units of insulin (refer to [facility]) at this time only. Repeat POC glucose in approx 1 hour. Will continue to monitor.

## 2020-08-15 NOTE — PROGRESS NOTES
SEVERE SEPSIS / SEPTIC SHOCK REASSESSMENT    S: Lima Canales is currently being treated for Severe Sepsis. He is undergoing treatment, including Broad-Spectrum Antibiotics and Fluid Challenge. He has recently completed the initial 30 cc/kg fluid challenge. O:   Sepsis Re-Assessment Documentation:   Vital Signs  Level of Consciousness: Alert  Temp: 98 °F (36.7 °C)  Temp Source: Oral  Pulse (Heart Rate): 78  Heart Rate Source: Monitor  Cardiac Rhythm: Normal sinus rhythm  Resp Rate: 28  BP: 149/56  MAP (Monitor): 78  MAP (Calculated): 84  BP 1 Location: Left arm  BP 1 Method: Automatic  BP Patient Position: At rest  MEWS Score: 2    Recent Results (from the past 4 hour(s))   SAMPLES BEING HELD    Collection Time: 08/15/20  9:22 AM   Result Value Ref Range    SAMPLES BEING HELD 1SST     COMMENT        Add-on orders for these samples will be processed based on acceptable specimen integrity and analyte stability, which may vary by analyte. D DIMER    Collection Time: 08/15/20  9:22 AM   Result Value Ref Range    D-dimer 0.37 0.00 - 0.65 mg/L FEU   HEMOGLOBIN A1C WITH EAG    Collection Time: 08/15/20  9:22 AM   Result Value Ref Range    Hemoglobin A1c 7.7 (H) 4.0 - 5.6 %    Est. average glucose 174 mg/dL   LIPID PANEL    Collection Time: 08/15/20  9:22 AM   Result Value Ref Range    LIPID PROFILE          Cholesterol, total 112 <200 MG/DL    Triglyceride 172 (H) <150 MG/DL    HDL Cholesterol 43 MG/DL    LDL, calculated 34.6 0 - 100 MG/DL    VLDL, calculated 34.4 MG/DL    CHOL/HDL Ratio 2.6 0.0 - 5.0         General appearance: alert, cooperative, no distress, appears stated age  Lungs: clear to auscultation bilaterally, no wheezes, no increased work of breathing  Heart: regular rate and rhythm, S1, S2 normal, no murmur, click, rub or gallop. Pulses: 2+ and symmetric   Capillary Refill: <3 seconds (normal/brisk) Abdomen: soft, non-tender.  Bowel sounds normal. No masses,  no organomegaly  Extremities: extremities normal, atraumatic, no cyanosis or edema  Skin: Flushed  Neurologic: Alert and oriented X 3, normal strength and tone. Normal symmetric reflexes. Assessment and Plan: Digna Winston is an 61 y.o. male currently being treated for severe sepsis.   -Continue Vanc, Cefepime, Doxy, and Flagyl  -Last LA was 3.0, will f/u repeat   -F/u bcx      Sanjeev Morillo DO  8/15/2020 12:26 PM

## 2020-08-15 NOTE — ED NOTES
Patient placed in hospital bed in low/locked position with clean linen. Patient is in no acute distress, is updated on plan of care and has call bell within reach.

## 2020-08-15 NOTE — LETTER
NOTIFICATION OF RETURN TO WORK 
 
8/18/2020 Mr. Jose Olivas [address] [facility] [address] [id number] To Whom It May Concern: 
 
Jose Guido was under the care of [facility] from 08/15 - 08/18/2020. 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 If there are questions or concerns please have the patient contact our office.  
 
Sincerely, 
 
 
Fco Meza MD

## 2020-08-15 NOTE — ED NOTES
Verbal report given to [facility] [place] RN (name) on Niels Luis being transferred to 4th floor (unit) for routine progression of care    Report consisted of patient's Situation, Background, Assessment and Recommendations (SBAR)    Information from the following report(s)  SBAR, Kardex, ED Summary, Intake/Output, MAR and Recent Results was reviewed with the receiving nurse. Opportunity for questions and clarification was provided.     Patient transported with:  Tech    Last Filed Values:  Temp: 98 °F (36.7 °C) (08/15/20 1007)  Pulse (Heart Rate): 91 (08/15/20 1400)  Resp Rate: 28 (08/15/20 1400)  O2 Sat (%): 96 % (08/15/20 1510)  BP: 161/77 (08/15/20 1400)  MAP (Monitor): 99 (08/15/20 1400)  MAP (Calculated): 84 (08/15/20 0337)  Level of Consciousness: Alert (08/15/20 1007)      Lab Results   Component Value Date/Time    WBC 16.9 (H) 08/15/2020 03:44 AM    Lactic acid 2.8 (HH) 08/15/2020 01:54 PM    Lactic acid 3.0 (HH) 08/15/2020 07:02 AM    Lactic acid 3.6 (HH) 08/15/2020 03:44 AM       Repeat LA:  Time Due 1800    Blood Cultures Drawn:  yes    Fluid Resuscitation:  Total needed done, Status other  complete    All Antibiotics Started:  yes, Dose Due refer to MAR    VS x 2 post-fluid resuscitation:   yes    Vasopressor Infusion:  no      Provider Reassessment needed and notified:  yes , Due     Additional Interventions/Comments:

## 2020-08-15 NOTE — ED NOTES
Bedside and Verbal shift change report given to Drew Parsons RN (oncoming nurse) by Kimberly Urias RN (offgoing nurse). Report included the following information SBAR, Kardex, ED Summary, Procedure Summary, Intake/Output, MAR and Recent Results. Patient moved to ED 15 for inpatient hold.

## 2020-08-15 NOTE — PROGRESS NOTES
Primary Nurse Yina Ahumada RN and Mark Conti RN performed a dual skin assessment on this patient No impairment noted    Bilat legs are 4+ edema, red. Patient states those are normal for him.      Demetrius score is 20

## 2020-08-15 NOTE — ED TRIAGE NOTES
Pt arrives via EMS with a c/c of fever    Pt admits fever started yesterday at 1130am becoming progressively worse. 103.6 taken by EMS, and general malaise. Denies SOB or cough. . Nausea denies diarrhea. Pt admits to taking 2 tylenol 2 hours PTA.

## 2020-08-15 NOTE — PROGRESS NOTES
[facility]: MED RECONCILIATION    Comments/Recommendations:     Patient able to confirm name, , allergies and preferred pharmacy  Pt takes metoprolol ER 50 mg daily   Confirmed lantus dose and confirmed pt is taking humalog   Information obtained from: Patient/RX records    Allergies: Patient has no known allergies. Prior to Admission Medications:     Prior to Admission Medications   Prescriptions Last Dose Informant Patient Reported? Taking? apixaban (ELIQUIS) 5 mg tablet 2020 Self No Yes   Sig: Take 1 Tab by mouth two (2) times a day. aspirin delayed-release 81 mg tablet 2020 Self Yes Yes   Sig: Take 81 mg by mouth daily. dilTIAZem ER (CARDIZEM LA) 240 mg Tb24 tablet 2020 Self Yes Yes   Sig: Take 240 mg by mouth daily. hydroCHLOROthiazide (HYDRODIURIL) 25 mg tablet 2020 Self Yes Yes   Sig: Take 25 mg by mouth daily. insulin glargine (LANTUS,BASAGLAR) 100 unit/mL (3 mL) inpn 2020 Self Yes Yes   Si Units by SubCUTAneous route daily. insulin lispro (HUMALOG) 100 unit/mL kwikpen 2020 at Unknown time Self Yes Yes   Sig: by SubCUTAneous route Before breakfast, lunch, and dinner. Sliding scale   metFORMIN (GLUCOPHAGE) 1,000 mg tablet 2020 Self Yes Yes   Sig: Take 1,000 mg by mouth two (2) times daily (with meals). metoprolol succinate (TOPROL-XL) 50 mg XL tablet 2020 Self Yes Yes   Sig: Take 50 mg by mouth daily. rosuvastatin (CRESTOR) 20 mg tablet 2020 Self Yes Yes   Sig: Take 20 mg by mouth nightly. Facility-Administered Medications: None           [employer]. KAREL    Clinical Staff Pharmacist  [address] [place] [address]  [phone]

## 2020-08-15 NOTE — PROGRESS NOTES
Received call from the lab with critical result. Lactic acid 2.7. notified primary nurse Nikki Wetzel RN.

## 2020-08-15 NOTE — ED NOTES
Medications administered. Patient tolerated well. Meal tray given to pt  Pt updated on plan of care. Pt instructed to use call bell as needed. Pt does not appear to be in any acute distress/shows no evidence of clinical instability at this time.

## 2020-08-15 NOTE — ED NOTES
Verbal/Bedside report was given to [address] [place] [address] who will assume care of patient at this time. SBAR report consisted of ED summary, MAR, recent results, and additional pertinent information. Receiving nurse given opportunity to ask questions and seek clarifications. Receiving nurse aware of pending lab results and orders that are to be completed.

## 2020-08-15 NOTE — H&P
[address] [facility] [address] [address]   Office [phone]  Fax [phone]       Admission H&P     Name: Khoi Hdz MRN: [id number]  Sex: Male   YOB: 1960  Age: 61 y.o. PCP: Leonia Cooks, DO     Source of Information: patient, medical records    Chief complaint: Fever    History of Present Illness    Khoi Hdz is a 61 y.o. male with known HTN, DVT,HLD and DM2 who presents to the ER complaining of fever. The patient endorses malaise and a fever that began yesterday morning. His fever remained throughout the day even with Tylenol so he he decided to come into the ER. The highest temp he notes is 103. 6F which was in the EMS. He admits to decrease PO intake with nausea but denies any cough, sob, body aches, tick exposure, CP, emesis, diarrhea, or sick contacts. 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 No one else in his home has these symptoms. COVID questions: : Positive fever. Denies cough, sob,or  diarrhea. Lives with wife. Denies any sick or COVID contacts. In the ER: In the ED:  - Vitals  100.2 F, 90, 24, 148/52, 90% on RA  - Labs  Remarkable for WBC 16.9, Na 131 (corrected 135), Cr 1.42  Glu 367, LA 3.6, Mg 2.0, T bili 2.4. Rapid COVID indeterminate   - Imaging  CXR: neg for any acute process  - Treatment  1 L NS x 3, 2g cefepime, and 750mg Levaquin    EKG: NSR with rate of 90.  QTc 467    Past Medical History:   Diagnosis Date    AR (allergic rhinitis) 6/11/2009    [facility]     Dr. Marleny Diaz    Hypercholesterolemia     Hypertension       Patient Vitals for the past 12 hrs:   Temp Pulse Resp BP SpO2   08/15/20 0715  76 27 157/60 93 %   08/15/20 0700  77 14 155/60 95 %   08/15/20 0645  77 28 152/57 96 %   08/15/20 0615  74 27 142/59 100 %   08/15/20 0600  76 23 140/73    08/15/20 0545  77 28 141/62 95 %   08/15/20 0530  77 28 140/59 97 %   08/15/20 0515  80 16 127/49 95 %   08/15/20 0500  80 28 145/59    08/15/20 0445  84 28 131/61    08/15/20 0430  98 23 (!) 152/114    08/15/20 0337 100.2 °F (37.9 °C) 90 24 148/52 90 %       Home Medications   Prior to Admission medications    Medication Sig Start Date End Date Taking? Authorizing Provider   apixaban (ELIQUIS) 5 mg tablet Take 1 Tab by mouth two (2) times a day. 20   Edy Boateng NP   dilTIAZem ER (CARDIZEM LA) 240 mg Tb24 tablet Take 240 mg by mouth daily. Provider, Historical   metoprolol tartrate (LOPRESSOR) 50 mg tablet Take  by mouth two (2) times a day. Provider, Historical   hydroCHLOROthiazide (HYDRODIURIL) 25 mg tablet Take 25 mg by mouth daily. Provider, Historical   rosuvastatin (CRESTOR) 20 mg tablet Take 20 mg by mouth nightly. Provider, Historical   aspirin delayed-release 81 mg tablet Take  by mouth daily. Provider, Historical   insulin glargine (LANTUS,BASAGLAR) 100 unit/mL (3 mL) inpn 76 Units by SubCUTAneous route daily. 19   Shamir Lockett,    metFORMIN (GLUCOPHAGE) 1,000 mg tablet Take 1,000 mg by mouth two (2) times daily (with meals). Provider, Historical   NOVOLOG FLEXPEN 100 unit/mL InPn 1 Units by SubCUTAneous route Before breakfast, lunch, and dinner.  Sliding scale    Indications: TYPE 2 DIABETES MELLITUS    Provider, Historical       Allergies  No Known Allergies    Past Medical History:   Diagnosis Date    AR (allergic rhinitis) 2009    [facility] ([facility]     Dr. Winkler Plush    Hypercholesterolemia     Hypertension        Past Surgical History:   Procedure Laterality Date    HX HEART CATHETERIZATION      normal       Family History   Problem Relation Age of Onset    Heart Failure Father     Diabetes Brother     Heart Failure Paternal Grandfather          age 37 AMI       Social History  Social History     Socioeconomic History    Marital status:      Spouse name: Not on file    Number of children: Not on file    Years of education: Not on file    Highest education level: Not on file Occupational History    Occupation:    Social Needs    Financial resource strain: Not on file    Food insecurity     Worry: Not on file     Inability: Not on file   [employer] needs     Medical: Not on file     Non-medical: Not on file   Tobacco Use    Smoking status: Former Smoker     Packs/day: 1.00     Years: 15.00     Pack years: 15.00     Types: Cigarettes, Pipe, Cigars     Last attempt to quit: 3/12/1991     Years since quittin.4    Smokeless tobacco: Never Used   Substance and Sexual Activity    Alcohol use: No    Drug use: No    Sexual activity: Not on file   Lifestyle    Physical activity     Days per week: Not on file     Minutes per session: Not on file    Stress: Not on file   Relationships    Social connections     Talks on phone: Not on file     Gets together: Not on file     Attends Rastafarian service: Not on file     Active member of club or organization: Not on file     Attends meetings of clubs or organizations: Not on file     Relationship status: Not on file    Intimate partner violence     Fear of current or ex partner: Not on file     Emotionally abused: Not on file     Physically abused: Not on file     Forced sexual activity: Not on file   Other Topics Concern   [address] Service Not Asked    Blood Transfusions Not Asked    Caffeine Concern Not Asked    Occupational Exposure Not Asked   [place] Hazards Not Asked    Sleep Concern Not Asked    Stress Concern Not Asked    Weight Concern Not Asked    Special Diet Not Asked    Back Care Not Asked    Exercise No    Bike Helmet Not Asked    Seat Belt Not Asked    Self-Exams Not Asked   Social History Narrative    Not on file       Alcohol history: Not at all  Smoking history: Non-smoker  Illicit drug history: Not at all    Living arrangement: patient lives with their spouse. Ambulates: Independently     Review of Systems   Constitutional: Positive for chills and fever. Negative for malaise/fatigue. HENT: Negative for sinus pain and sore throat. Respiratory: Negative for cough, sputum production, shortness of breath, wheezing and stridor. Cardiovascular: Negative for chest pain, palpitations and orthopnea. Gastrointestinal: Positive for nausea. Negative for abdominal pain, constipation, diarrhea, heartburn and vomiting. Genitourinary: Positive for frequency. Musculoskeletal: Negative for back pain, joint pain and neck pain. Neurological: Negative for dizziness, tingling, focal weakness and headaches. Physical Exam      O2 Device: Room air     Physical Exam  Constitutional:       General: He is not in acute distress. Appearance: He is obese. HENT:      Head: Normocephalic and atraumatic. Nose: No congestion or rhinorrhea. Cardiovascular:      Rate and Rhythm: Normal rate and regular rhythm. Heart sounds: No murmur. Pulmonary:      Effort: Pulmonary effort is normal. No respiratory distress. Breath sounds: No wheezing or rales. Abdominal:      Palpations: Abdomen is soft. Tenderness: There is no abdominal tenderness. There is no guarding or rebound. Comments: Obese abdomen   Musculoskeletal:         General: No tenderness. Right lower leg: Edema present. Left lower leg: Edema present. Skin:     General: Skin is warm. Capillary Refill: Capillary refill takes less than 2 seconds. Neurological:      Mental Status: He is oriented to person, place, and time. Cranial Nerves: No cranial nerve deficit. Motor: No weakness. Laboratory Data  Recent Results (from the past 8 hour(s))   CBC WITH AUTOMATED DIFF    Collection Time: 08/15/20  3:44 AM   Result Value Ref Range    WBC 16.9 (H) 4.1 - 11.1 K/uL    RBC 4.70 4. 10 - 5.70 M/uL    HGB 13.5 12.1 - 17.0 g/dL    HCT 40.2 36.6 - 50.3 %    MCV 85.5 80.0 - 99.0 FL    MCH 28.7 26.0 - 34.0 PG    MCHC 33.6 30.0 - 36.5 g/dL    RDW 15.7 (H) 11.5 - 14.5 %    PLATELET 059 677 - 570 K/uL MPV 10.5 8.9 - 12.9 FL    NRBC 0.0 0  WBC    ABSOLUTE NRBC 0.00 0.00 - 0.01 K/uL    NEUTROPHILS 87 (H) 32 - 75 %    LYMPHOCYTES 5 (L) 12 - 49 %    MONOCYTES 8 5 - 13 %    EOSINOPHILS 0 0 - 7 %    BASOPHILS 0 0 - 1 %    IMMATURE GRANULOCYTES 0 0.0 - 0.5 %    ABS. NEUTROPHILS 14.7 (H) 1.8 - 8.0 K/UL    ABS. LYMPHOCYTES 0.9 0.8 - 3.5 K/UL    ABS. MONOCYTES 1.3 (H) 0.0 - 1.0 K/UL    ABS. EOSINOPHILS 0.0 0.0 - 0.4 K/UL    ABS. BASOPHILS 0.0 0.0 - 0.1 K/UL    ABS. IMM. GRANS. 0.1 (H) 0.00 - 0.04 K/UL    DF AUTOMATED     METABOLIC PANEL, COMPREHENSIVE    Collection Time: 08/15/20  3:44 AM   Result Value Ref Range    Sodium 131 (L) 136 - 145 mmol/L    Potassium 3.7 3.5 - 5.1 mmol/L    Chloride 97 97 - 108 mmol/L    CO2 23 21 - 32 mmol/L    Anion gap 11 5 - 15 mmol/L    Glucose 367 (H) 65 - 100 mg/dL    BUN 19 6 - 20 MG/DL    Creatinine 1.42 (H) 0.70 - 1.30 MG/DL    BUN/Creatinine ratio 13 12 - 20      GFR est AA >60 >60 ml/min/1.73m2    GFR est non-AA 51 (L) >60 ml/min/1.73m2    Calcium 8.5 8.5 - 10.1 MG/DL    Bilirubin, total 2.4 (H) 0.2 - 1.0 MG/DL    ALT (SGPT) 26 12 - 78 U/L    AST (SGOT) 10 (L) 15 - 37 U/L    Alk.  phosphatase 65 45 - 117 U/L    Protein, total 7.3 6.4 - 8.2 g/dL    Albumin 3.5 3.5 - 5.0 g/dL    Globulin 3.8 2.0 - 4.0 g/dL    A-G Ratio 0.9 (L) 1.1 - 2.2     MAGNESIUM    Collection Time: 08/15/20  3:44 AM   Result Value Ref Range    Magnesium 2.0 1.6 - 2.4 mg/dL   CULTURE, BLOOD, PAIRED    Collection Time: 08/15/20  3:44 AM    Specimen: Blood   Result Value Ref Range    Special Requests: NO SPECIAL REQUESTS      Culture result: NO GROWTH AFTER 3 HOURS     LACTIC ACID    Collection Time: 08/15/20  3:44 AM   Result Value Ref Range    Lactic acid 3.6 (HH) 0.4 - 2.0 MMOL/L   SAMPLES BEING HELD    Collection Time: 08/15/20  3:44 AM   Result Value Ref Range    SAMPLES BEING HELD 1SST,1RD,1BLU     COMMENT        Add-on orders for these samples will be processed based on acceptable specimen integrity and analyte stability, which may vary by analyte. URINALYSIS W/ REFLEX CULTURE    Collection Time: 08/15/20  4:45 AM    Specimen: Urine   Result Value Ref Range    Color YELLOW/STRAW      Appearance CLEAR CLEAR      Specific gravity 1.025 1.003 - 1.030      pH (UA) 5.5 5.0 - 8.0      Protein Negative NEG mg/dL    Glucose >1,000 (A) NEG mg/dL    Ketone 80 (A) NEG mg/dL    Bilirubin Negative NEG      Blood SMALL (A) NEG      Urobilinogen 0.2 0.2 - 1.0 EU/dL    Nitrites Negative NEG      Leukocyte Esterase Negative NEG      UA:UC IF INDICATED CULTURE NOT INDICATED BY UA RESULT CNI      WBC 0-4 0 - 4 /hpf    RBC 0-5 0 - 5 /hpf    Epithelial cells FEW FEW /lpf    Bacteria Negative NEG /hpf    Hyaline cast 0-2 0 - 5 /lpf   SARS-COV-2    Collection Time: 08/15/20  5:44 AM   Result Value Ref Range    Specimen source Nasopharyngeal      Specimen source Nasopharyngeal      COVID-19 rapid test Indeterminate (A) NOTD      Specimen type NP Swab      Health status Symptomatic Testing     LACTIC ACID    Collection Time: 08/15/20  7:02 AM   Result Value Ref Range    Lactic acid 3.0 (HH) 0.4 - 2.0 MMOL/L   SARS-COV-2    Collection Time: 08/15/20  7:02 AM   Result Value Ref Range    Specimen source Nasopharyngeal      Specimen source Nasopharyngeal      COVID-19 rapid test Not detected NOTD      Specimen type NP Swab      Health status Symptomatic Testing         Imaging  CXR Results  (Last 48 hours)               08/15/20 0401  XR CHEST PORT Final result    Impression:  IMPRESSION: No evidence of acute cardiopulmonary process. Narrative:  INDICATION: Fever. Hypoxia. FINDINGS: AP portable imaging of the chest performed at 3:58 AM demonstrates a   normal cardiomediastinal silhouette. The lungs are clear bilaterally. No   significant osseous abnormalities are seen.                CT Results  (Last 48 hours)               08/15/20 0648  CTA CHEST W OR W WO CONT Final result    Impression:  IMPRESSION: No acute abnormalities. Cholelithiasis       Narrative:  Clinical indication: Hypoxia, fever, abdominal pain. Localizer obtained without contrast at the level of the pulmonary arteries. Fast   injection rate of 100 cc of Isovue-370 with review of the raw data and MIP   reconstructions, no prior. CT dose reduction was achieved through the use of a   standardized protocol tailored for this examination and automatic exposure   control for dose modulation . [place] Plaster Axial CT scan of the abdomen and pelvis obtained after intravenous contrast. No   prior. There are no pulmonary emboli. Aorta show no focal findings. There is no pericardial pleural effusion. No shift or pneumothorax   There is no mediastinal adenopathy, no parenchymal mass. Liver and spleen are normal in size. No focal mass. Gallstones are seen in a nondistended gallbladder. Adrenals appear unremarkable. The pancreas does appear unremarkable. Kidneys are unobstructed. There is no free air or free fluid. Small fat-containing umbilical hernia. The bladder is midline. There is no bowel wall thickening or obstruction. No abdominal or pelvic adenopathy. 08/15/20 0648  CT ABD PELV W CONT Final result    Impression:  IMPRESSION: No acute abnormalities. Cholelithiasis       Narrative:  Clinical indication: Hypoxia, fever, abdominal pain. Localizer obtained without contrast at the level of the pulmonary arteries. Fast   injection rate of 100 cc of Isovue-370 with review of the raw data and MIP   reconstructions, no prior. CT dose reduction was achieved through the use of a   standardized protocol tailored for this examination and automatic exposure   control for dose modulation . [place] Plaster Axial CT scan of the abdomen and pelvis obtained after intravenous contrast. No   prior. There are no pulmonary emboli. Aorta show no focal findings. There is no pericardial pleural effusion.  No shift or pneumothorax There is no mediastinal adenopathy, no parenchymal mass. Liver and spleen are normal in size. No focal mass. Gallstones are seen in a nondistended gallbladder. Adrenals appear unremarkable. The pancreas does appear unremarkable. Kidneys are unobstructed. There is no free air or free fluid. Small fat-containing umbilical hernia. The bladder is midline. There is no bowel wall thickening or obstruction. No abdominal or pelvic adenopathy. Assessment and Plan     João Rodrigues is a 61 y.o. male with a PMH of DM2, HTN, DVT, and HLD who is admitted for SIRS criteria with lactic acidosis. 3/4 SIRS criteria with lactic acidosis (WBC 16.9, T 103.6 in EMS and RR 24) POA LA 3.6 Ddx include: COVID, other viral illness, or abdominal pathology. CXR no acute process. CT abd positive for cholelithiasis. S/p 3L of NS in ED  -Admit to Remote telemetry  -Vital signs per unit protocol  -Vanc, Cefepime, Doxycylcine and flagyl. Will d/c levaquin d/t QTc prolongation   -Additional 2L of NS to equate to 30cc/kg  -Trend LA q4h  -RVP, PNA labs  -Bcx  -Daily CMP/CBC    COVID PUI patient denies any recent exposure, SOB, or cough. Presents with complaint of fever  -Zinc, Vit C, and Lipitor  -Daily COVID labs  DVT Diagnosed in 5/20/2020 in Proximal femoral vein spanning into popliteal in RLE  - Continue home Eliquis 5mg BID    Elevated Cr Cr 1.42 POA. No clear baseline but in 2019 was 0.98. Likely elevated d/t decrease po intake  -Will give an additional 2L of NS  -Strict I&O, monitor to keep urine output > 30 mL/hr  -Avoid nephrotoxic agents  -Monitor daily on CMP    Hypertension: BP on admission 148/52   - Continuing home medications of Diltiazem 240 mg daily, HCTZ 25 mg daily and lopressor 50 mg BID once med rec is complete  - Will continue to monitor at this time and readjust as BP's trend. DMII - A1c 7.2 (8/6/19) On metformin 1000 mg BID & Lantus 76 units daily with home sliding scale. Patient endorses he uses 60 units per meal   - Holding home medications of metformin 1000 mg BID  - Start on Lispro 48 units with meals, Lantus 60 units at bedtime. (80 percent)  - Insulin Sliding Scale normal sensitivity with AC&HS glucose checks. - Hypoglycemia protocols ordered. -Repeat A1c    Hyperlipidemia: , , HDL 40, LDL 48 (8/6/19) Holding home medication of Crestor 20 mg daily   -Lipitor 20mg daily  - Will reorder lipid panel    Prolonged QTc  POA  -Avoid prolonging agents    Obesity BMI Body mass index is 48.4 kg/m². - Encouraging lifestyle modifications and further follow up outpatient. FEN/GI - Diabetic diet. Activity - Ambulate as tolerated  DVT prophylaxis - Home Eliquis  GI prophylaxis - Not indicated at this time  Fall prophylaxis - Not indicated at this time. Disposition - Admit to Remote Telemetry. Plan to d/c to TBD. Code Status - Partial, discussed with patient / caregivers. Next of Kin Name and Krish Schroeder [address] [employer] [phone]    Patient Virgil Blas will be discussed Dr. Lala Reynolds.      8:42 AM, 08/15/20  Piotr Rdz DO  Family Medicine Resident       For Billing    Chief Complaint   Patient presents with    Fever    Fatigue       Hospital Problems  Date Reviewed: 8/6/2019          Codes Class Noted POA    Severe sepsis [facility]) ICD-10-CM: A41.9, R65.20  ICD-9-CM: 038.9, 995.92  8/15/2020 Unknown

## 2020-08-15 NOTE — ED PROVIDER NOTES
HPI   60 yo M presents with fever onset yesterday morning to 103. C/o bodyaches. Denies sore throat, runny nose, vomiting, diarrhea, cp, sob, cough, rash, tick bites, abdominal pain, dysuria. Not on oxygen at home. Recently diagnosed with DVT on eliquis, reports compliance. Took tylenol 2 hours ago. No known sick contacts.    Past Medical History:   Diagnosis Date    AR (allergic rhinitis) 2009    Diabetes ([facility]     Dr. Leotha Homans    Hypercholesterolemia     Hypertension        Past Surgical History:   Procedure Laterality Date    HX HEART CATHETERIZATION      normal         Family History:   Problem Relation Age of Onset    Heart Failure Father     Diabetes Brother     Heart Failure Paternal Grandfather          age 37 AMI       Social History     Socioeconomic History    Marital status:      Spouse name: Not on file    Number of children: Not on file    Years of education: Not on file    Highest education level: Not on file   Occupational History    Occupation:    Social Needs    Financial resource strain: Not on file    Food insecurity     Worry: Not on file     Inability: Not on file    Transportation needs     Medical: Not on file     Non-medical: Not on file   Tobacco Use    Smoking status: Former Smoker     Packs/day: 1.00     Years: 15.00     Pack years: 15.00     Types: Cigarettes, Pipe, Cigars     Last attempt to quit: 3/12/1991     Years since quittin.4    Smokeless tobacco: Never Used   Substance and Sexual Activity    Alcohol use: No    Drug use: No    Sexual activity: Not on file   Lifestyle    Physical activity     Days per week: Not on file     Minutes per session: Not on file    Stress: Not on file   Relationships    Social connections     Talks on phone: Not on file     Gets together: Not on file     Attends Yazidism service: Not on file     Active member of club or organization: Not on file     Attends meetings of clubs or organizations: Not on file     Relationship status: Not on file    Intimate partner violence     Fear of current or ex partner: Not on file     Emotionally abused: Not on file     Physically abused: Not on file     Forced sexual activity: Not on file   Other Topics Concern     Service Not Asked    Blood Transfusions Not Asked    Caffeine Concern Not Asked    Occupational Exposure Not Asked    Hobby Hazards Not Asked    Sleep Concern Not Asked    Stress Concern Not Asked    Weight Concern Not Asked    Special Diet Not Asked    Back Care Not Asked    Exercise No    Bike Helmet Not Asked   [address] Not Asked    Self-Exams Not Asked   Social History Narrative    Not on file         ALLERGIES: Patient has no known allergies. Review of Systems   Constitutional: Positive for chills and fever. Respiratory: Negative for cough and shortness of breath. Cardiovascular: Negative for chest pain and leg swelling. Gastrointestinal: Positive for nausea. Negative for abdominal pain, diarrhea and vomiting. Genitourinary: Positive for frequency. Negative for dysuria and hematuria. Musculoskeletal: Negative for arthralgias. Skin: Negative for rash and wound. Neurological: Negative for headaches. All other systems reviewed and are negative.       Vitals:    08/15/20 0337   BP: 148/52   Pulse: 90   Resp: 24   Temp: 100.2 °F (37.9 °C)   SpO2: 90%   Weight: (!) 171 kg (377 lb)   Height: 6' 2\" (1.88 m)            Physical Exam   Physical Examination: General appearance - alert, well appearing, and in no distress, oriented to person, place, and time and obese  Eyes - pupils equal and reactive, extraocular eye movements intact  Neck - supple, no significant adenopathy, no nuchal rigidity or meningismus  Chest - clear to auscultation, no wheezes, rales or rhonchi, symmetric air entry  Heart - normal rate, regular rhythm, normal S1, S2, no murmurs, rubs, clicks or gallops  Abdomen - soft, nontender, nondistended, no masses or organomegaly  Back exam - full range of motion, no tenderness, palpable spasm or pain on motion  Neurological - alert, oriented, normal speech, no focal findings or movement disorder noted  Musculoskeletal - no joint tenderness, deformity or swelling  Extremities - peripheral pulses normal, no pedal edema, no clubbing or cyanosis  Skin - normal coloration and turgor, venous stasis changes to bilateral lower extremities  MDM  Number of Diagnoses or Management Options  Fever, unspecified fever cause:   SIRS (systemic inflammatory response syndrome) ([facility]):      Amount and/or Complexity of Data Reviewed  Clinical lab tests: ordered and reviewed  Tests in the radiology section of CPT®: ordered and reviewed  Decide to obtain previous medical records or to obtain history from someone other than the patient: yes  Obtain history from someone other than the patient: yes (EMS)  Review and summarize past medical records: yes  Discuss the patient with other providers: yes (hospitalist)  Independent visualization of images, tracings, or specimens: yes    Patient Progress  Patient progress: improved         Procedures  ED EKG interpretation:  Rhythm: normal sinus rhythm; and regular . Rate (approx.): 91; Axis: normal; P wave: normal; QRS interval: normal ; ST/T wave: non-specific changes; prolonged QTc  EKG documented by Leonardo Fortune MD    Hospitalist Perfect Serve for Admission  7:14 AM    ED Room Number: ER06/06  Patient Name and age:  Barney Campoverde 61 y.o.  male  Working Diagnosis:   1. Fever, unspecified fever cause    2. S[facility] (systemic inflammatory response syndrome) ([facility]        COVID-19 Suspicion:  yes    Code Status:  Full Code  Readmission: no  Isolation Requirements:  yes  Recommended Level of Care:  med/surg  Department:[facility] ED - [phone]      Updated patient on plan for admission. Given patient was Sirs criteria unknown infection source will admit for further evaluation.   CT chest and abdomen pending.

## 2020-08-15 NOTE — PROGRESS NOTES
[facility] Dosing Services: Antimicrobial Stewardship Progress Note    Consult for antibiotic dosing of vancomycin/flagyl by Dr. Dayday Larsen  Pharmacist reviewed antibiotic appropriateness for 61year old , male  for indication of sepsis  Day of Therapy 1    Plan:  Vancomycin therapy:  Start Vancomycin therapy, with loading dose of 2500mg. Follow with maintenance dose of 2250mg every 12 hours (13mg/kg). Patient in mild OTTO at admission, fluids given in ED, hopeful to improve, lower mg/kg maintenance dosing ordered due to patient weight and risk for accumulation. Dose calculated to approximate a therapeutic trough of 15-20 mcg/mL. Last trough level / Plan for level: Trough prior to 4th dose. Pharmacy to follow daily and will make changes to dose and/or frequency based on clinical status. Date Dose & Interval Measured (mcg/mL) Extrapolated (mcg/mL)   ? ? ? ?   ? ? ? ?   ? ? ? ? Non-Kinetic Antimicrobial Dosing:   Recommendation: Start flagyl 500mg IV every 12 hours    Non-Kinetic Antimicrobial Dosing:   Recommendation: Start doxycyline 100mg IV every 12 hours    Other Antimicrobial  (not dosed by pharmacist)   Cefepime 2g IV every 8 hours   Cultures     8/15 Blood: in process   Serum Creatinine     Lab Results   Component Value Date/Time    Creatinine 1.42 (H) 08/15/2020 03:44 AM       Creatinine Clearance Estimated Creatinine Clearance: 93.2 mL/min (A) (based on SCr of 1.42 mg/dL (H)).      Procalcitonin  No results found for: PCT     Temp   100.2 °F (37.9 °C)    WBC   Lab Results   Component Value Date/Time    WBC 16.9 (H) 08/15/2020 03:44 AM       H/H   Lab Results   Component Value Date/Time    HGB 13.5 08/15/2020 03:44 AM      Platelets Lab Results   Component Value Date/Time    PLATELET 587 74/41/2557 03:44 AM            Pharmacist: Signed Booker Jones Contact information: [id number]

## 2020-08-15 NOTE — ED NOTES
Assumed care of patient. Introduced self as primary nurse using [address] [place] [address] Nw. Stretcher in low locked position with call bell within reach. Pt updated on plan of care and wait times. Pt instructed to use call bell if assistance is needed.

## 2020-08-16 LAB
ALBUMIN SERPL-MCNC: 3.2 G/DL (ref 3.5–5)
ALBUMIN/GLOB SERPL: 0.8 {RATIO} (ref 1.1–2.2)
ALP SERPL-CCNC: 56 U/L (ref 45–117)
ALT SERPL-CCNC: 20 U/L (ref 12–78)
ANION GAP SERPL CALC-SCNC: 8 MMOL/L (ref 5–15)
AST SERPL-CCNC: 20 U/L (ref 15–37)
ATRIAL RATE: 91 BPM
BASOPHILS # BLD: 0 K/UL (ref 0–0.1)
BASOPHILS NFR BLD: 0 % (ref 0–1)
BILIRUB SERPL-MCNC: 1.5 MG/DL (ref 0.2–1)
BUN SERPL-MCNC: 16 MG/DL (ref 6–20)
BUN/CREAT SERPL: 17 (ref 12–20)
CALCIUM SERPL-MCNC: 8.3 MG/DL (ref 8.5–10.1)
CALCULATED P AXIS, ECG09: 16 DEGREES
CALCULATED R AXIS, ECG10: 25 DEGREES
CALCULATED T AXIS, ECG11: 34 DEGREES
CHLORIDE SERPL-SCNC: 104 MMOL/L (ref 97–108)
CO2 SERPL-SCNC: 21 MMOL/L (ref 21–32)
COMMENT, HOLDF: NORMAL
CREAT SERPL-MCNC: 0.95 MG/DL (ref 0.7–1.3)
CRP SERPL-MCNC: 14.2 MG/DL (ref 0–0.6)
D DIMER PPP FEU-MCNC: 0.47 MG/L FEU (ref 0–0.65)
DIAGNOSIS, 93000: NORMAL
DIFFERENTIAL METHOD BLD: ABNORMAL
EOSINOPHIL # BLD: 0 K/UL (ref 0–0.4)
EOSINOPHIL NFR BLD: 0 % (ref 0–7)
ERYTHROCYTE [DISTWIDTH] IN BLOOD BY AUTOMATED COUNT: 16.2 % (ref 11.5–14.5)
FERRITIN SERPL-MCNC: 170 NG/ML (ref 26–388)
GLOBULIN SER CALC-MCNC: 4 G/DL (ref 2–4)
GLUCOSE BLD STRIP.AUTO-MCNC: 129 MG/DL (ref 65–100)
GLUCOSE BLD STRIP.AUTO-MCNC: 170 MG/DL (ref 65–100)
GLUCOSE BLD STRIP.AUTO-MCNC: 208 MG/DL (ref 65–100)
GLUCOSE BLD STRIP.AUTO-MCNC: 247 MG/DL (ref 65–100)
GLUCOSE SERPL-MCNC: 99 MG/DL (ref 65–100)
HCT VFR BLD AUTO: 39.5 % (ref 36.6–50.3)
HEALTH STATUS, XMCV2T: NORMAL
HGB BLD-MCNC: 12.9 G/DL (ref 12.1–17)
IMM GRANULOCYTES # BLD AUTO: 0 K/UL (ref 0–0.04)
IMM GRANULOCYTES NFR BLD AUTO: 0 % (ref 0–0.5)
LACTATE SERPL-SCNC: 1.1 MMOL/L (ref 0.4–2)
LDH SERPL L TO P-CCNC: 212 U/L (ref 85–241)
LYMPHOCYTES # BLD: 1.7 K/UL (ref 0.8–3.5)
LYMPHOCYTES NFR BLD: 13 % (ref 12–49)
MCH RBC QN AUTO: 28.7 PG (ref 26–34)
MCHC RBC AUTO-ENTMCNC: 32.7 G/DL (ref 30–36.5)
MCV RBC AUTO: 87.8 FL (ref 80–99)
MONOCYTES # BLD: 1.5 K/UL (ref 0–1)
MONOCYTES NFR BLD: 12 % (ref 5–13)
NEUTS SEG # BLD: 9.8 K/UL (ref 1.8–8)
NEUTS SEG NFR BLD: 75 % (ref 32–75)
NRBC # BLD: 0 K/UL (ref 0–0.01)
NRBC BLD-RTO: 0 PER 100 WBC
P-R INTERVAL, ECG05: 172 MS
PLATELET # BLD AUTO: 189 K/UL (ref 150–400)
PMV BLD AUTO: 10.4 FL (ref 8.9–12.9)
POTASSIUM SERPL-SCNC: 3.5 MMOL/L (ref 3.5–5.1)
PROCALCITONIN SERPL-MCNC: 0.79 NG/ML
PROT SERPL-MCNC: 7.2 G/DL (ref 6.4–8.2)
Q-T INTERVAL, ECG07: 380 MS
QRS DURATION, ECG06: 78 MS
QTC CALCULATION (BEZET), ECG08: 467 MS
RBC # BLD AUTO: 4.5 M/UL (ref 4.1–5.7)
SAMPLES BEING HELD,HOLD: NORMAL
SARS-COV-2, COV2: NOT DETECTED
SERVICE CMNT-IMP: ABNORMAL
SODIUM SERPL-SCNC: 133 MMOL/L (ref 136–145)
SOURCE, COVRS: NORMAL
SPECIMEN SOURCE, FCOV2M: NORMAL
SPECIMEN TYPE, XMCV1T: NORMAL
VENTRICULAR RATE, ECG03: 91 BPM
WBC # BLD AUTO: 13.1 K/UL (ref 4.1–11.1)

## 2020-08-16 PROCEDURE — 84145 PROCALCITONIN (PCT): CPT

## 2020-08-16 PROCEDURE — 83605 ASSAY OF LACTIC ACID: CPT

## 2020-08-16 PROCEDURE — 74011250636 HC RX REV CODE- 250/636: Performed by: STUDENT IN AN ORGANIZED HEALTH CARE EDUCATION/TRAINING PROGRAM

## 2020-08-16 PROCEDURE — 36415 COLL VENOUS BLD VENIPUNCTURE: CPT

## 2020-08-16 PROCEDURE — 74011250637 HC RX REV CODE- 250/637: Performed by: STUDENT IN AN ORGANIZED HEALTH CARE EDUCATION/TRAINING PROGRAM

## 2020-08-16 PROCEDURE — 74011636637 HC RX REV CODE- 636/637: Performed by: STUDENT IN AN ORGANIZED HEALTH CARE EDUCATION/TRAINING PROGRAM

## 2020-08-16 PROCEDURE — 99232 SBSQ HOSP IP/OBS MODERATE 35: CPT | Performed by: FAMILY MEDICINE

## 2020-08-16 PROCEDURE — 74011000258 HC RX REV CODE- 258: Performed by: STUDENT IN AN ORGANIZED HEALTH CARE EDUCATION/TRAINING PROGRAM

## 2020-08-16 PROCEDURE — 82962 GLUCOSE BLOOD TEST: CPT

## 2020-08-16 PROCEDURE — 65660000000 HC RM CCU STEPDOWN

## 2020-08-16 RX ADMIN — Medication 10 ML: at 00:05

## 2020-08-16 RX ADMIN — CEFEPIME HYDROCHLORIDE 2 G: 2 INJECTION, POWDER, FOR SOLUTION INTRAVENOUS at 16:11

## 2020-08-16 RX ADMIN — METOPROLOL SUCCINATE 50 MG: 50 TABLET, EXTENDED RELEASE ORAL at 09:15

## 2020-08-16 RX ADMIN — INSULIN LISPRO 4 UNITS: 100 INJECTION, SOLUTION INTRAVENOUS; SUBCUTANEOUS at 12:30

## 2020-08-16 RX ADMIN — METRONIDAZOLE 500 MG: 500 INJECTION, SOLUTION INTRAVENOUS at 12:30

## 2020-08-16 RX ADMIN — APIXABAN 5 MG: 5 TABLET, FILM COATED ORAL at 17:14

## 2020-08-16 RX ADMIN — Medication 10 ML: at 06:39

## 2020-08-16 RX ADMIN — APIXABAN 5 MG: 5 TABLET, FILM COATED ORAL at 09:15

## 2020-08-16 RX ADMIN — METRONIDAZOLE 500 MG: 500 INJECTION, SOLUTION INTRAVENOUS at 00:01

## 2020-08-16 RX ADMIN — ZINC SULFATE 220 MG (50 MG) CAPSULE 1 CAPSULE: CAPSULE at 09:15

## 2020-08-16 RX ADMIN — Medication 10 ML: at 14:00

## 2020-08-16 RX ADMIN — DILTIAZEM HYDROCHLORIDE 240 MG: 240 CAPSULE, COATED, EXTENDED RELEASE ORAL at 09:15

## 2020-08-16 RX ADMIN — Medication 500 MG: at 09:15

## 2020-08-16 RX ADMIN — INSULIN GLARGINE 76 UNITS: 100 INJECTION, SOLUTION SUBCUTANEOUS at 22:01

## 2020-08-16 RX ADMIN — CEFEPIME HYDROCHLORIDE 2 G: 2 INJECTION, POWDER, FOR SOLUTION INTRAVENOUS at 06:38

## 2020-08-16 RX ADMIN — INSULIN LISPRO 60 UNITS: 100 INJECTION, SOLUTION INTRAVENOUS; SUBCUTANEOUS at 17:13

## 2020-08-16 RX ADMIN — CEFEPIME HYDROCHLORIDE 2 G: 2 INJECTION, POWDER, FOR SOLUTION INTRAVENOUS at 22:01

## 2020-08-16 RX ADMIN — DOXYCYCLINE 100 MG: 100 INJECTION, POWDER, LYOPHILIZED, FOR SOLUTION INTRAVENOUS at 02:48

## 2020-08-16 RX ADMIN — VANCOMYCIN HYDROCHLORIDE 2250 MG: 10 INJECTION, POWDER, LYOPHILIZED, FOR SOLUTION INTRAVENOUS at 16:11

## 2020-08-16 RX ADMIN — ATORVASTATIN CALCIUM 20 MG: 20 TABLET, FILM COATED ORAL at 22:01

## 2020-08-16 RX ADMIN — INSULIN LISPRO 60 UNITS: 100 INJECTION, SOLUTION INTRAVENOUS; SUBCUTANEOUS at 12:30

## 2020-08-16 RX ADMIN — VANCOMYCIN HYDROCHLORIDE 2250 MG: 10 INJECTION, POWDER, LYOPHILIZED, FOR SOLUTION INTRAVENOUS at 02:47

## 2020-08-16 RX ADMIN — Medication 500 MG: at 17:14

## 2020-08-16 RX ADMIN — INSULIN LISPRO 60 UNITS: 100 INJECTION, SOLUTION INTRAVENOUS; SUBCUTANEOUS at 09:14

## 2020-08-16 RX ADMIN — Medication 10 ML: at 22:01

## 2020-08-16 RX ADMIN — INSULIN LISPRO 4 UNITS: 100 INJECTION, SOLUTION INTRAVENOUS; SUBCUTANEOUS at 17:13

## 2020-08-16 RX ADMIN — HYDROCHLOROTHIAZIDE 25 MG: 25 TABLET ORAL at 09:15

## 2020-08-16 NOTE — PROGRESS NOTES
[id number] [facility] RESIDENCY PROGRAM  PROGRESS NOTE     8/15/2020  PCP: Manan Maya,      Assessment/Plan:     Rohan Caballero is a 61 y.o. male with a PMH of DM2, HTN, DVT, and HLD who is admitted for severe sepsis. Events overnight: None    3/4 SIRS criteria with lactic acidosis (WBC 16.9, T 103.6 in EMS and RR 24) POA LA 3.6. Lacic acidosis resolved. Ddx include: COVID, other viral illness, or abdominal pathology. CXR no acute process. CT abd positive for cholelithiasis. S/p 3L of NS in ED. Lactic acid downtrending. RVP neg  -Vanc, Cefepime, and flagyl. S/p levaquin and doxycycline  -Additional 2L of NS to equate to 30cc/kg  -PNA labs pending     -Bcx, Uxc pending  -Daily CMP/CBC     Bacteremia Bcx 8/15 2/4 bottles growing GPC in chains. Repeat Bcx pending. -f/u speciation    COVID PUI patient denies any recent exposure, SOB, or cough. Presents with complaint of fever  -Zinc, Vit C, and Lipitor  -Daily COVID labs  -Covid pending    DVT Diagnosed in 5/20/2020 in Proximal femoral vein spanning into popliteal in RLE  - Continue home Eliquis 5mg BID     Elevated Cr Cr 1.42 POA. No clear baseline but in 2019 was 0.98. Likely elevated d/t decrease po intake. Addition 2L NS given. -Strict I&O, monitor to keep urine output > 30 mL/hr  -Avoid nephrotoxic agents  -Monitor daily on CMP     Hypertension: BP on admission 148/52   - Continue home Diltiazem 240 mg, HCTZ 25 mg, metoprolol 50mg  daily     DMII - A1c 7.7 POA. On metformin 1000 mg BID & Lantus 76 units daily with home sliding scale. Patient endorses he uses 60 units per meal   - Holding home medications of metformin 1000 mg BID  - Start on Lispro 48 units with meals, Lantus 60 units at bedtime. (80 percent)  - Insulin Sliding Scale normal sensitivity with AC&HS glucose checks. - Hypoglycemia protocols ordered. -Repeat A1c     Hyperlipidemia: , HDL 43, LDL 34.6.  Holding home Crestor 20 mg daily   -Lipitor 20mg daily  - Will reorder lipid panel     Prolonged QTc  POA  -Avoid prolonging agents     Obesity BMI Body mass index is 48.4 kg/m². - Encouraging lifestyle modifications and further follow up outpatient.         FEN/GI - Diabetic diet. Activity - Ambulate as tolerated  DVT prophylaxis - Home Eliquis  GI prophylaxis - Not indicated at this time  Fall prophylaxis - Not indicated at this time. Disposition - Admit to Remote Telemetry. Plan to d/c to TBD. Code Status - Partial, discussed with patient / caregivers. Next of Kin Name and Contact Vito Alvarado [phone]       Subjective:   Patient feels great this am. He only complains of urinary frequency, which has been consistent for him over 1 month. Denies chest pain, SOB, nausea, vomiting, abdominal pain, dizziness.      Objective:   Physical examination  Patient Vitals for the past 24 hrs:   Temp Pulse Resp BP SpO2   08/15/20 1619 98.5 °F (36.9 °C) 77 20 155/70 94 %   08/15/20 1549 99.5 °F (37.5 °C) 83 22 170/75 98 %   08/15/20 1510     96 %   08/15/20 1500  84 23 170/75 100 %   08/15/20 1400  91 28 161/77 96 %   08/15/20 1300  81 15 172/76 98 %   08/15/20 1230  78 13  95 %   08/15/20 1215  74 (!) 7  98 %   08/15/20 1200  78 16 150/75 96 %   08/15/20 1015  78 28 149/56 96 %   08/15/20 1007 98 °F (36.7 °C)       08/15/20 1000  76 (!) 31 152/62 97 %   08/15/20 0948     96 %   08/15/20 0945  76 28 149/65    08/15/20 0930  79 16 149/66 94 %   08/15/20 0915  78 18 135/79 99 %   08/15/20 0900  76  146/65 96 %   08/15/20 0845  76  156/75 97 %   08/15/20 0830  78 22 147/70    08/15/20 0815  79 20 149/55 94 %   08/15/20 0800  80 12 158/59 96 %   08/15/20 0745  80 23 152/60 96 %   08/15/20 0730  74 26 146/59 95 %   08/15/20 0715  76 27 157/60 93 %   08/15/20 0700  77 14 155/60 95 %   08/15/20 0645  77 28 152/57 96 %   08/15/20 0615  74 27 142/59 100 %   08/15/20 0600  76 23 140/73    08/15/20 0545  77 28 141/62 95 %   08/15/20 0530  77 28 140/59 97 %   08/15/20 0515  80 16 127/49 95 %   08/15/20 0500  80 28 145/59    08/15/20 0445  84 28 131/61    08/15/20 0430  98 23 (!) 152/114    08/15/20 0337 100.2 °F (37.9 °C) 90 24 148/52 90 %      Temp (24hrs), Av.1 °F (37.3 °C), Min:98 °F (36.7 °C), Max:100.2 °F (37.9 °C)         O2 Device: Room air    Date 20 - 08/15/20 0659(Not Admitted) 08/15/20 0700 - 20 0659   Shift 5550-8872 24 Hour Total 2358-4551 7006-9076 24 Hour Total   INTAKE   P.O.   240  240     P. O.   240  240   I. V.(mL/kg/hr) 3000 3000 100(0)  100     Volume (sodium chloride 0.9 % bolus infusion 1,000 mL) 1000 1000        Volume (sodium chloride 0.9 % bolus infusion 1,000 mL) 1000 1000        Volume (sodium chloride 0.9 % bolus infusion 1,000 mL) 1000 1000        Volume (metroNIDAZOLE (FLAGYL) IVPB premix 500 mg)   100  100   Shift Total(mL/kg) 3000(17.5) 3000(17.5) 340(2)  340(2)   OUTPUT   Urine(mL/kg/hr)   1325(0.6)  1325     Urine Voided   1325  1325   Shift Total(mL/kg)   1325(7.7)  1325(7.7)   NET 3000 3000 -985  -985   Weight (kg) 171 171 171 171 171       Physical Exam   General: No acute distress. Alert and cooperative. Respiratory: Clear to auscultation bilaterally. No wheezes, rales, or rhonchi. Cardiovascular: RRR. Pulses strong and regular. No murmurs. GI: Soft, nontender, nondistended, no masses or organomegaly. Extremities: 3+ nonpitting edema. Distal pulses intact  Skin: Warm and dry. No rashes or color changes  Neuro: Oriented x3. Data Review:     CBC:  Recent Labs     08/15/20  0344   WBC 16.9*   HGB 13.5   HCT 40.2        Metabolic Panel:  Recent Labs     08/15/20  0344   *   K 3.7   CL 97   CO2 23   BUN 19   CREA 1.42*   *   CA 8.5   MG 2.0   ALB 3.5   TBILI 2.4*   ALT 26     Micro:  Lab Results   Component Value Date/Time    Culture result:  08/15/2020 03:44 AM     TWO OF FOUR BOTTLES HAVE BEEN FLAGGED POSITIVE BY INSTRUMENT.   BOTTLES HAVE BEEN SENT TO [facility] TO ASSESS FOR POSSIBLE GROWTH. Culture result: REMAINING BOTTLE(S) HAS/HAVE NO GROWTH SO FAR 08/15/2020 03:44 AM     Imaging:  Cta Chest W Or W Wo Cont    Result Date: 8/15/2020  Clinical indication: Hypoxia, fever, abdominal pain. Localizer obtained without contrast at the level of the pulmonary arteries. Fast injection rate of 100 cc of Isovue-370 with review of the raw data and MIP reconstructions, no prior. CT dose reduction was achieved through the use of a standardized protocol tailored for this examination and automatic exposure control for dose modulation . Reji Angry Axial CT scan of the abdomen and pelvis obtained after intravenous contrast. No prior. There are no pulmonary emboli. Aorta show no focal findings. There is no pericardial pleural effusion. No shift or pneumothorax There is no mediastinal adenopathy, no parenchymal mass. Liver and spleen are normal in size. No focal mass. Gallstones are seen in a nondistended gallbladder. Adrenals appear unremarkable. The pancreas does appear unremarkable. Kidneys are unobstructed. There is no free air or free fluid. Small fat-containing umbilical hernia. The bladder is midline. There is no bowel wall thickening or obstruction. No abdominal or pelvic adenopathy. IMPRESSION: No acute abnormalities. Cholelithiasis    Ct Abd Pelv W Cont    Result Date: 8/15/2020  Clinical indication: Hypoxia, fever, abdominal pain. Localizer obtained without contrast at the level of the pulmonary arteries. Fast injection rate of 100 cc of Isovue-370 with review of the raw data and MIP reconstructions, no prior. CT dose reduction was achieved through the use of a standardized protocol tailored for this examination and automatic exposure control for dose modulation . Reji Angry Axial CT scan of the abdomen and pelvis obtained after intravenous contrast. No prior. There are no pulmonary emboli. Aorta show no focal findings. There is no pericardial pleural effusion.  No shift or pneumothorax There is no mediastinal adenopathy, no parenchymal mass. Liver and spleen are normal in size. No focal mass. Gallstones are seen in a nondistended gallbladder. Adrenals appear unremarkable. The pancreas does appear unremarkable. Kidneys are unobstructed. There is no free air or free fluid. Small fat-containing umbilical hernia. The bladder is midline. There is no bowel wall thickening or obstruction. No abdominal or pelvic adenopathy. IMPRESSION: No acute abnormalities. Cholelithiasis    Xr Chest Port    Result Date: 8/15/2020  INDICATION: Fever. Hypoxia. FINDINGS: AP portable imaging of the chest performed at 3:58 AM demonstrates a normal cardiomediastinal silhouette. The lungs are clear bilaterally. No significant osseous abnormalities are seen. IMPRESSION: No evidence of acute cardiopulmonary process.      Medications reviewed  Current Facility-Administered Medications   Medication Dose Route Frequency    sodium chloride (NS) flush 5-10 mL  5-10 mL IntraVENous PRN    sodium chloride (NS) flush 5-40 mL  5-40 mL IntraVENous Q8H    sodium chloride (NS) flush 5-40 mL  5-40 mL IntraVENous PRN    acetaminophen (TYLENOL) tablet 650 mg  650 mg Oral Q6H PRN    Or    acetaminophen (TYLENOL) suppository 650 mg  650 mg Rectal Q6H PRN    glucose chewable tablet 16 g  4 Tab Oral PRN    dextrose (D50W) injection syrg 12.5-25 g  25-50 mL IntraVENous PRN    glucagon (GLUCAGEN) injection 1 mg  1 mg IntraMUSCular PRN    zinc sulfate (ZINCATE) 220 (50) mg capsule 1 Cap  1 Cap Oral DAILY    ascorbic acid (vitamin C) (VITAMIN C) tablet 500 mg  500 mg Oral BID    atorvastatin (LIPITOR) tablet 20 mg  20 mg Oral QHS    metroNIDAZOLE (FLAGYL) IVPB premix 500 mg  500 mg IntraVENous Q12H    [START ON 8/16/2020] vancomycin (VANCOCIN) 2,250 mg in 0.9% sodium chloride 500 mL IVPB  2,250 mg IntraVENous Q12H    apixaban (ELIQUIS) tablet 5 mg  5 mg Oral BID    doxycycline (VIBRAMYCIN) 100 mg in 0.9% sodium chloride (MBP/ADV) 100 mL  100 mg IntraVENous Q12H    [START ON 8/16/2020] metoprolol succinate (TOPROL-XL) XL tablet 50 mg  50 mg Oral DAILY    [START ON 8/16/2020] hydroCHLOROthiazide (HYDRODIURIL) tablet 25 mg  25 mg Oral DAILY    [START ON 8/16/2020] dilTIAZem ER (CARDIZEM CD) capsule 240 mg  240 mg Oral DAILY    insulin lispro (HUMALOG) injection   SubCUTAneous AC&HS    insulin glargine (LANTUS) injection 76 Units  76 Units SubCUTAneous QHS    insulin lispro (HUMALOG) injection 60 Units  60 Units SubCUTAneous TIDAC    cefepime (MAXIPIME) 2 g in 0.9% sodium chloride (MBP/ADV) 100 mL  2 g IntraVENous Q8H         Signed:   Carter Gordon MD   Resident, Family Medicine

## 2020-08-16 NOTE — PROGRESS NOTES
Problem: Diabetes Self-Management  Goal: *Disease process and treatment process  Description: Define diabetes and identify own type of diabetes; list 3 options for treating diabetes. Outcome: Progressing Towards Goal  Goal: *Incorporating nutritional management into lifestyle  Description: Describe effect of type, amount and timing of food on blood glucose; list 3 methods for planning meals. Outcome: Progressing Towards Goal  Goal: *Incorporating physical activity into lifestyle  Description: State effect of exercise on blood glucose levels. Outcome: Progressing Towards Goal  Goal: *Developing strategies to promote health/change behavior  Description: Define the ABC's of diabetes; identify appropriate screenings, schedule and personal plan for screenings. Outcome: Progressing Towards Goal  Goal: *Using medications safely  Description: State effect of diabetes medications on diabetes; name diabetes medication taking, action and side effects. Outcome: Progressing Towards Goal  Goal: *Monitoring blood glucose, interpreting and using results  Description: Identify recommended blood glucose targets  and personal targets. Outcome: Progressing Towards Goal  Goal: *Prevention, detection, treatment of acute complications  Description: List symptoms of hyper- and hypoglycemia; describe how to treat low blood sugar and actions for lowering  high blood glucose level. Outcome: Progressing Towards Goal  Goal: *Prevention, detection and treatment of chronic complications  Description: Define the natural course of diabetes and describe the relationship of blood glucose levels to long term complications of diabetes.   Outcome: Progressing Towards Goal  Goal: *Developing strategies to address psychosocial issues  Description: Describe feelings about living with diabetes; identify support needed and support network  Outcome: Progressing Towards Goal  Goal: *Patient Specific Goal (EDIT GOAL, INSERT TEXT)  Outcome: Progressing Towards Goal

## 2020-08-16 NOTE — PROGRESS NOTES
Problem: Diabetes Self-Management  Goal: *Disease process and treatment process  Description: Define diabetes and identify own type of diabetes; list 3 options for treating diabetes. Outcome: Progressing Towards Goal  Goal: *Incorporating nutritional management into lifestyle  Description: Describe effect of type, amount and timing of food on blood glucose; list 3 methods for planning meals. Outcome: Progressing Towards Goal  Goal: *Incorporating physical activity into lifestyle  Description: State effect of exercise on blood glucose levels. Outcome: Progressing Towards Goal  Goal: *Developing strategies to promote health/change behavior  Description: Define the ABC's of diabetes; identify appropriate screenings, schedule and personal plan for screenings. Outcome: Progressing Towards Goal  Goal: *Using medications safely  Description: State effect of diabetes medications on diabetes; name diabetes medication taking, action and side effects. Outcome: Progressing Towards Goal  Goal: *Monitoring blood glucose, interpreting and using results  Description: Identify recommended blood glucose targets  and personal targets. Outcome: Progressing Towards Goal  Goal: *Prevention, detection, treatment of acute complications  Description: List symptoms of hyper- and hypoglycemia; describe how to treat low blood sugar and actions for lowering  high blood glucose level. Outcome: Progressing Towards Goal  Goal: *Prevention, detection and treatment of chronic complications  Description: Define the natural course of diabetes and describe the relationship of blood glucose levels to long term complications of diabetes.   Outcome: Progressing Towards Goal  Goal: *Developing strategies to address psychosocial issues  Description: Describe feelings about living with diabetes; identify support needed and support network  Outcome: Progressing Towards Goal  Goal: *Insulin pump training  Outcome: Progressing Towards Goal  Goal: *Sick day guidelines  Outcome: Progressing Towards Goal  Goal: *Patient Specific Goal (EDIT GOAL, INSERT TEXT)  Outcome: Progressing Towards Goal     Problem: Patient Education: Go to Patient Education Activity  Goal: Patient/Family Education  Outcome: Progressing Towards Goal     Problem: Falls - Risk of  Goal: *Absence of Falls  Description: Document [facility] Fall Risk and appropriate interventions in the flowsheet.   Outcome: Progressing Towards Goal  Note: Fall Risk Interventions:  Mobility Interventions: Patient to call before getting OOB         Medication Interventions: Teach patient to arise slowly                   Problem: Patient Education: Go to Patient Education Activity  Goal: Patient/Family Education  Outcome: Progressing Towards Goal

## 2020-08-16 NOTE — PROGRESS NOTES
[address] [facility] RESIDENCY PROGRAM  PROGRESS NOTE     8/16/2020  PCP: Marc Ragland DO     Assessment/Plan:     Claudetta Poster is a 61 y.o. male with a PMH of DM2, HTN, DVT, and HLD who is admitted for severe sepsis. Events overnight: None    3/4 SIRS criteria with lactic acidosis (WBC 16.9, T 103.6 in EMS and RR 24) POA LA 3.6. Lacic acidosis resolved. Ddx: prostatitis/UTI, viral illness, abdominal pathology, or other etiology. CXR no acute process. CT abdo: cholelithiasis. S/p 3L of NS in ED. Lactic acid downtrending. RVP neg. Bcx: beta hemolytic strep. Covid neg x2. Pt complains of frequency, urgency, foul smelling urine; UA wnl, Ucx pending. Started Eliquis 1 month ago, diarrhea every morning requiring Imodium.   -Vanc, Cefepime, and flagyl since 8/15  -PNA labs pending     -Bcx, Uxc pending  -Daily CMP/CBC     Bacteremia Bcx 8/15 2/4 bottles growing GPC in chains. Repeat Bcx pending. -f/u speciation    COVID PUI patient denies any recent exposure, SOB, or cough. Presents with complaint of fever  -Zinc, Vit C, and Lipitor  -Daily COVID labs  -Covid pending    DVT Diagnosed in 5/20/2020 in Proximal femoral vein spanning into popliteal in RLE  - Continue home Eliquis 5mg BID     Elevated Cr Cr 1.42 POA. No clear baseline but in 2019 was 0.98. Likely elevated d/t decrease po intake. Addition 2L NS given. -Strict I&O, monitor to keep urine output > 30 mL/hr  -Avoid nephrotoxic agents  -Monitor daily on CMP     Hypertension: BP on admission 148/52   - Continue home Diltiazem 240 mg, HCTZ 25 mg, metoprolol 50mg  daily     DMII - A1c 7.7 POA. On metformin 1000 mg BID & Lantus 76 units daily with home sliding scale. Patient endorses he uses 60 units per meal   - Holding home medications of metformin 1000 mg BID  - Start on Lispro 48 units with meals, Lantus 60 units at bedtime. (80 percent)  - Insulin Sliding Scale normal sensitivity with AC&HS glucose checks.   - Hypoglycemia protocols ordered.     Hyperlipidemia: Mildly elevated triglycerides. Holding home Crestor 20 mg daily   - Lipitor 20mg daily     Prolonged QTc  POA  -Avoid prolonging agents     Obesity BMI Body mass index is 48.4 kg/m². - Encouraging lifestyle modifications and further follow up outpatient.         FEN/GI - Diabetic diet. Activity - Ambulate as tolerated  DVT prophylaxis - Home Eliquis  GI prophylaxis - Not indicated at this time  Fall prophylaxis - Not indicated at this time. Disposition - Admit to Remote Telemetry. Plan to d/c to TBD. Code Status - Partial, discussed with patient / caregivers. Next of Kin Name and Contact Eliot Donahue [phone]       Subjective:   Patient feels great this am. He only complains of urinary frequency, which has been consistent for him over 1 month. Denies chest pain, SOB, nausea, vomiting, abdominal pain, dizziness. Objective:   Physical examination  Patient Vitals for the past 24 hrs:   Temp Pulse Resp BP SpO2   20 1508  78      20 0914 98.7 °F (37.1 °C) 89 16 138/71 92 %   20 0840     92 %   20 0704  87      20 0430 98.1 °F (36.7 °C) 75 18 147/67 96 %   20 0400  73      20 0001 98.9 °F (37.2 °C) 81 18 136/65 93 %   08/15/20 2142 99.6 °F (37.6 °C) 84 18 130/61 91 %      Temp (24hrs), Av.8 °F (37.1 °C), Min:98.1 °F (36.7 °C), Max:99.6 °F (37.6 °C)         O2 Device: Room air    Date 08/15/20 0700 - 20 - 20 0659   Shift [id number] 24 Hour Total 2229-5534 0474-4938 24 Hour Total   INTAKE   P.O. 240   1000     P. O. 240   1000   I. V.(mL/kg/hr) 100(0) 920(0.4) 1020(0.2)        Volume (metroNIDAZOLE (FLAGYL) IVPB premix 500 mg) 100 100 200        Volume (vancomycin (VANCOCIN) 2,250 mg in 0.9% sodium chloride 500 mL IVPB)  500 500        Volume (doxycycline (VIBRAMYCIN) 100 mg in 0.9% sodium chloride (MBP/ADV) 100 mL)  200 200        Volume (cefepime (MAXIPIME) 2 g in 0.9% sodium chloride (MBP/ADV) 100 mL)  100 100        Volume (cefepime (MAXIPIME) 2 g in sterile water (preservative free) 10 mL IV syringe)  20 20      Shift Total(mL/kg) 340(2) 1220(7.1) 1560(9.1) 1000(5.8)  1000(5.8)   OUTPUT   Urine(mL/kg/hr) 1325(0.6)  1325(0.3) 1900  1900     Urine Voided 1325  1325 1900  1900   Shift Total(mL/kg) 1325(7.7)  1325(7.7) 1900(11.1)  1900(11.1)   NET -985 1220 235 -900  -900   Weight (kg) 171 171 171 171 171 171       Physical Exam   General: No acute distress. Alert and cooperative. Respiratory: Clear to auscultation bilaterally. No wheezes, rales, or rhonchi. Cardiovascular: RRR. Pulses strong and regular. No murmurs. GI: Soft, nontender, nondistended, no masses or organomegaly. Extremities: 3+ nonpitting edema. Chronic hemosiderin changes. Some redness above R compression stocking. Distal pulses intact  Skin: Warm and dry. No rashes or color changes  Neuro: Oriented x3. Data Review:     CBC:  Recent Labs     08/15/20  2328 08/15/20  0344   WBC 13.1* 16.9*   HGB 12.9 13.5   HCT 39.5 40.2    090     Metabolic Panel:  Recent Labs     08/15/20  2328 08/15/20  0344   * 131*   K 3.5 3.7    97   CO2 21 23   BUN 16 19   CREA 0.95 1.42*   GLU 99 367*   CA 8.3* 8.5   MG  --  2.0   ALB 3.2* 3.5   TBILI 1.5* 2.4*   ALT 20 26     Micro:  Lab Results   Component Value Date/Time    Culture result: NO GROWTH AFTER 4 HOURS 08/15/2020 11:28 PM    Culture result: (A) 08/15/2020 03:44 AM     STREPTOCOCCI, BETA HEMOLYTIC GROUP B SENSITIVITY TO FOLLOW GROWING IN 2 OF 4 BOTTLES DRAWN ([facility] SITE)    Culture result: (A) 08/15/2020 03:44 AM     PRELIMINARY REPORT OF GRAM POSITIVE COCCI IN CHAINS GROWING IN 2 OF 4 BOTTLES DRAWN CALLED TO AND READ BACK BY MS Chitra Parekh RN ON 8/15/20 AT [phone] ([facility]).  MS    Culture result: REMAINING BOTTLE(S) HAS/HAVE NO GROWTH SO FAR 08/15/2020 03:44 AM     Imaging:  Cta Chest W Or W Wo Cont    Result Date: 8/15/2020  Clinical indication: Hypoxia, fever, abdominal pain. Localizer obtained without contrast at the level of the pulmonary arteries. Fast injection rate of 100 cc of Isovue-370 with review of the raw data and MIP reconstructions, no prior. CT dose reduction was achieved through the use of a standardized protocol tailored for this examination and automatic exposure control for dose modulation . Curtis Rein Axial CT scan of the abdomen and pelvis obtained after intravenous contrast. No prior. There are no pulmonary emboli. Aorta show no focal findings. There is no pericardial pleural effusion. No shift or pneumothorax There is no mediastinal adenopathy, no parenchymal mass. Liver and spleen are normal in size. No focal mass. Gallstones are seen in a nondistended gallbladder. Adrenals appear unremarkable. The pancreas does appear unremarkable. Kidneys are unobstructed. There is no free air or free fluid. Small fat-containing umbilical hernia. The bladder is midline. There is no bowel wall thickening or obstruction. No abdominal or pelvic adenopathy. IMPRESSION: No acute abnormalities. Cholelithiasis    Ct Abd Pelv W Cont    Result Date: 8/15/2020  Clinical indication: Hypoxia, fever, abdominal pain. Localizer obtained without contrast at the level of the pulmonary arteries. Fast injection rate of 100 cc of Isovue-370 with review of the raw data and MIP reconstructions, no prior. CT dose reduction was achieved through the use of a standardized protocol tailored for this examination and automatic exposure control for dose modulation . Curtis Rein Axial CT scan of the abdomen and pelvis obtained after intravenous contrast. No prior. There are no pulmonary emboli. Aorta show no focal findings. There is no pericardial pleural effusion. No shift or pneumothorax There is no mediastinal adenopathy, no parenchymal mass. Liver and spleen are normal in size. No focal mass. Gallstones are seen in a nondistended gallbladder. Adrenals appear unremarkable.  The pancreas does appear unremarkable. Kidneys are unobstructed. There is no free air or free fluid. Small fat-containing umbilical hernia. The bladder is midline. There is no bowel wall thickening or obstruction. No abdominal or pelvic adenopathy. IMPRESSION: No acute abnormalities. Cholelithiasis    Xr Chest Port    Result Date: 8/15/2020  INDICATION: Fever. Hypoxia. FINDINGS: AP portable imaging of the chest performed at 3:58 AM demonstrates a normal cardiomediastinal silhouette. The lungs are clear bilaterally. No significant osseous abnormalities are seen. IMPRESSION: No evidence of acute cardiopulmonary process.      Medications reviewed  Current Facility-Administered Medications   Medication Dose Route Frequency    sodium chloride (NS) flush 5-10 mL  5-10 mL IntraVENous PRN    sodium chloride (NS) flush 5-40 mL  5-40 mL IntraVENous Q8H    sodium chloride (NS) flush 5-40 mL  5-40 mL IntraVENous PRN    acetaminophen (TYLENOL) tablet 650 mg  650 mg Oral Q6H PRN    Or    acetaminophen (TYLENOL) suppository 650 mg  650 mg Rectal Q6H PRN    glucose chewable tablet 16 g  4 Tab Oral PRN    dextrose (D50W) injection syrg 12.5-25 g  25-50 mL IntraVENous PRN    glucagon (GLUCAGEN) injection 1 mg  1 mg IntraMUSCular PRN    zinc sulfate (ZINCATE) 220 (50) mg capsule 1 Cap  1 Cap Oral DAILY    ascorbic acid (vitamin C) (VITAMIN C) tablet 500 mg  500 mg Oral BID    atorvastatin (LIPITOR) tablet 20 mg  20 mg Oral QHS    metroNIDAZOLE (FLAGYL) IVPB premix 500 mg  500 mg IntraVENous Q12H    vancomycin (VANCOCIN) 2,250 mg in 0.9% sodium chloride 500 mL IVPB  2,250 mg IntraVENous Q12H    apixaban (ELIQUIS) tablet 5 mg  5 mg Oral BID    metoprolol succinate (TOPROL-XL) XL tablet 50 mg  50 mg Oral DAILY    hydroCHLOROthiazide (HYDRODIURIL) tablet 25 mg  25 mg Oral DAILY    dilTIAZem ER (CARDIZEM CD) capsule 240 mg  240 mg Oral DAILY    insulin lispro (HUMALOG) injection   SubCUTAneous AC&HS    insulin glargine (LANTUS) injection 76 Units  76 Units SubCUTAneous QHS    insulin lispro (HUMALOG) injection 60 Units  60 Units SubCUTAneous TIDAC    cefepime (MAXIPIME) 2 g in 0.9% sodium chloride (MBP/ADV) 100 mL  2 g IntraVENous Q8H         Signed:   Mir Molina MD   Resident, Family Medicine

## 2020-08-16 NOTE — PROGRESS NOTES
Bedside and Verbal shift change report given to Arun Mcbride RN (oncoming nurse) by Baldev Dorantes RN (offgoing nurse).  Report included the following information SBAR, Kardex, Intake/Output, MAR and Recent Results.

## 2020-08-17 LAB
ALBUMIN SERPL-MCNC: 3 G/DL (ref 3.5–5)
ALBUMIN/GLOB SERPL: 0.8 {RATIO} (ref 1.1–2.2)
ALP SERPL-CCNC: 53 U/L (ref 45–117)
ALT SERPL-CCNC: 51 U/L (ref 12–78)
ANION GAP SERPL CALC-SCNC: 7 MMOL/L (ref 5–15)
AST SERPL-CCNC: 50 U/L (ref 15–37)
BACTERIA SPEC CULT: NORMAL
BASOPHILS # BLD: 0 K/UL (ref 0–0.1)
BASOPHILS NFR BLD: 0 % (ref 0–1)
BILIRUB SERPL-MCNC: 1.1 MG/DL (ref 0.2–1)
BUN SERPL-MCNC: 13 MG/DL (ref 6–20)
BUN/CREAT SERPL: 15 (ref 12–20)
CALCIUM SERPL-MCNC: 8.3 MG/DL (ref 8.5–10.1)
CHLORIDE SERPL-SCNC: 107 MMOL/L (ref 97–108)
CO2 SERPL-SCNC: 25 MMOL/L (ref 21–32)
CREAT SERPL-MCNC: 0.88 MG/DL (ref 0.7–1.3)
CRP SERPL-MCNC: 10.4 MG/DL (ref 0–0.6)
D DIMER PPP FEU-MCNC: 0.5 MG/L FEU (ref 0–0.65)
DATE LAST DOSE: ABNORMAL
DIFFERENTIAL METHOD BLD: ABNORMAL
EOSINOPHIL # BLD: 0.1 K/UL (ref 0–0.4)
EOSINOPHIL NFR BLD: 2 % (ref 0–7)
ERYTHROCYTE [DISTWIDTH] IN BLOOD BY AUTOMATED COUNT: 15.9 % (ref 11.5–14.5)
FERRITIN SERPL-MCNC: 190 NG/ML (ref 26–388)
FLUID CULTURE, SPNG2: NORMAL
GLOBULIN SER CALC-MCNC: 3.6 G/DL (ref 2–4)
GLUCOSE BLD STRIP.AUTO-MCNC: 132 MG/DL (ref 65–100)
GLUCOSE BLD STRIP.AUTO-MCNC: 162 MG/DL (ref 65–100)
GLUCOSE BLD STRIP.AUTO-MCNC: 200 MG/DL (ref 65–100)
GLUCOSE BLD STRIP.AUTO-MCNC: 256 MG/DL (ref 65–100)
GLUCOSE SERPL-MCNC: 106 MG/DL (ref 65–100)
HCT VFR BLD AUTO: 37.9 % (ref 36.6–50.3)
HGB BLD-MCNC: 12.3 G/DL (ref 12.1–17)
IL6 SERPL-MCNC: 136.1 PG/ML (ref 0–15.5)
IL6 SERPL-MCNC: 78.4 PG/ML (ref 0–15.5)
IMM GRANULOCYTES # BLD AUTO: 0 K/UL (ref 0–0.04)
IMM GRANULOCYTES NFR BLD AUTO: 0 % (ref 0–0.5)
L PNEUMO1 AG UR QL IA: NEGATIVE
LDH SERPL L TO P-CCNC: 205 U/L (ref 85–241)
LYMPHOCYTES # BLD: 1.8 K/UL (ref 0.8–3.5)
LYMPHOCYTES NFR BLD: 24 % (ref 12–49)
MCH RBC QN AUTO: 28.1 PG (ref 26–34)
MCHC RBC AUTO-ENTMCNC: 32.5 G/DL (ref 30–36.5)
MCV RBC AUTO: 86.5 FL (ref 80–99)
MONOCYTES # BLD: 1.1 K/UL (ref 0–1)
MONOCYTES NFR BLD: 15 % (ref 5–13)
NEUTS SEG # BLD: 4.4 K/UL (ref 1.8–8)
NEUTS SEG NFR BLD: 59 % (ref 32–75)
NRBC # BLD: 0 K/UL (ref 0–0.01)
NRBC BLD-RTO: 0 PER 100 WBC
ORGANISM ID, SPNG3: NORMAL
PLATELET # BLD AUTO: 178 K/UL (ref 150–400)
PLEASE NOTE, SPNG4: NORMAL
PMV BLD AUTO: 10.3 FL (ref 8.9–12.9)
POTASSIUM SERPL-SCNC: 3.1 MMOL/L (ref 3.5–5.1)
PROT SERPL-MCNC: 6.6 G/DL (ref 6.4–8.2)
RBC # BLD AUTO: 4.38 M/UL (ref 4.1–5.7)
REPORTED DOSE,DOSE: ABNORMAL UNITS
REPORTED DOSE/TIME,TMG: ABNORMAL
S PNEUM AG SPEC QL LA: NEGATIVE
SERVICE CMNT-IMP: ABNORMAL
SERVICE CMNT-IMP: NORMAL
SODIUM SERPL-SCNC: 139 MMOL/L (ref 136–145)
SPECIMEN SOURCE: NORMAL
SPECIMEN SOURCE: NORMAL
SPECIMEN, SPNG1: NORMAL
VANCOMYCIN TROUGH SERPL-MCNC: 13.8 UG/ML (ref 5–10)
WBC # BLD AUTO: 7.3 K/UL (ref 4.1–11.1)

## 2020-08-17 PROCEDURE — 80202 ASSAY OF VANCOMYCIN: CPT

## 2020-08-17 PROCEDURE — 82728 ASSAY OF FERRITIN: CPT

## 2020-08-17 PROCEDURE — 65660000000 HC RM CCU STEPDOWN

## 2020-08-17 PROCEDURE — 74011000258 HC RX REV CODE- 258: Performed by: STUDENT IN AN ORGANIZED HEALTH CARE EDUCATION/TRAINING PROGRAM

## 2020-08-17 PROCEDURE — 74011250637 HC RX REV CODE- 250/637: Performed by: STUDENT IN AN ORGANIZED HEALTH CARE EDUCATION/TRAINING PROGRAM

## 2020-08-17 PROCEDURE — 83520 IMMUNOASSAY QUANT NOS NONAB: CPT

## 2020-08-17 PROCEDURE — 85025 COMPLETE CBC W/AUTO DIFF WBC: CPT

## 2020-08-17 PROCEDURE — 86140 C-REACTIVE PROTEIN: CPT

## 2020-08-17 PROCEDURE — 83615 LACTATE (LD) (LDH) ENZYME: CPT

## 2020-08-17 PROCEDURE — 74011250636 HC RX REV CODE- 250/636: Performed by: FAMILY MEDICINE

## 2020-08-17 PROCEDURE — 74011636637 HC RX REV CODE- 636/637: Performed by: STUDENT IN AN ORGANIZED HEALTH CARE EDUCATION/TRAINING PROGRAM

## 2020-08-17 PROCEDURE — 82962 GLUCOSE BLOOD TEST: CPT

## 2020-08-17 PROCEDURE — 80053 COMPREHEN METABOLIC PANEL: CPT

## 2020-08-17 PROCEDURE — 85379 FIBRIN DEGRADATION QUANT: CPT

## 2020-08-17 PROCEDURE — 36415 COLL VENOUS BLD VENIPUNCTURE: CPT

## 2020-08-17 PROCEDURE — 74011250636 HC RX REV CODE- 250/636: Performed by: STUDENT IN AN ORGANIZED HEALTH CARE EDUCATION/TRAINING PROGRAM

## 2020-08-17 RX ADMIN — INSULIN LISPRO 3 UNITS: 100 INJECTION, SOLUTION INTRAVENOUS; SUBCUTANEOUS at 07:37

## 2020-08-17 RX ADMIN — HYDROCHLOROTHIAZIDE 25 MG: 25 TABLET ORAL at 09:18

## 2020-08-17 RX ADMIN — INSULIN LISPRO 60 UNITS: 100 INJECTION, SOLUTION INTRAVENOUS; SUBCUTANEOUS at 16:30

## 2020-08-17 RX ADMIN — Medication 10 ML: at 06:29

## 2020-08-17 RX ADMIN — CEFEPIME HYDROCHLORIDE 2 G: 2 INJECTION, POWDER, FOR SOLUTION INTRAVENOUS at 06:29

## 2020-08-17 RX ADMIN — POTASSIUM BICARBONATE 40 MEQ: 782 TABLET, EFFERVESCENT ORAL at 09:18

## 2020-08-17 RX ADMIN — Medication 10 ML: at 14:34

## 2020-08-17 RX ADMIN — VANCOMYCIN HYDROCHLORIDE 2500 MG: 10 INJECTION, POWDER, LYOPHILIZED, FOR SOLUTION INTRAVENOUS at 17:58

## 2020-08-17 RX ADMIN — METRONIDAZOLE 500 MG: 500 INJECTION, SOLUTION INTRAVENOUS at 12:06

## 2020-08-17 RX ADMIN — Medication 500 MG: at 09:18

## 2020-08-17 RX ADMIN — METOPROLOL SUCCINATE 50 MG: 50 TABLET, EXTENDED RELEASE ORAL at 09:18

## 2020-08-17 RX ADMIN — Medication 10 ML: at 21:46

## 2020-08-17 RX ADMIN — APIXABAN 5 MG: 5 TABLET, FILM COATED ORAL at 17:58

## 2020-08-17 RX ADMIN — CEFEPIME HYDROCHLORIDE 2 G: 2 INJECTION, POWDER, FOR SOLUTION INTRAVENOUS at 14:34

## 2020-08-17 RX ADMIN — Medication 500 MG: at 17:58

## 2020-08-17 RX ADMIN — INSULIN LISPRO 7 UNITS: 100 INJECTION, SOLUTION INTRAVENOUS; SUBCUTANEOUS at 12:06

## 2020-08-17 RX ADMIN — METRONIDAZOLE 500 MG: 500 INJECTION, SOLUTION INTRAVENOUS at 02:03

## 2020-08-17 RX ADMIN — ATORVASTATIN CALCIUM 20 MG: 20 TABLET, FILM COATED ORAL at 21:46

## 2020-08-17 RX ADMIN — VANCOMYCIN HYDROCHLORIDE 2250 MG: 10 INJECTION, POWDER, LYOPHILIZED, FOR SOLUTION INTRAVENOUS at 03:12

## 2020-08-17 RX ADMIN — INSULIN GLARGINE 76 UNITS: 100 INJECTION, SOLUTION SUBCUTANEOUS at 21:46

## 2020-08-17 RX ADMIN — APIXABAN 5 MG: 5 TABLET, FILM COATED ORAL at 09:18

## 2020-08-17 RX ADMIN — CEFEPIME HYDROCHLORIDE 2 G: 2 INJECTION, POWDER, FOR SOLUTION INTRAVENOUS at 21:46

## 2020-08-17 RX ADMIN — ZINC SULFATE 220 MG (50 MG) CAPSULE 1 CAPSULE: CAPSULE at 09:18

## 2020-08-17 RX ADMIN — INSULIN LISPRO 60 UNITS: 100 INJECTION, SOLUTION INTRAVENOUS; SUBCUTANEOUS at 12:06

## 2020-08-17 RX ADMIN — INSULIN LISPRO 4 UNITS: 100 INJECTION, SOLUTION INTRAVENOUS; SUBCUTANEOUS at 16:30

## 2020-08-17 RX ADMIN — DILTIAZEM HYDROCHLORIDE 240 MG: 240 CAPSULE, COATED, EXTENDED RELEASE ORAL at 09:18

## 2020-08-17 RX ADMIN — INSULIN LISPRO 60 UNITS: 100 INJECTION, SOLUTION INTRAVENOUS; SUBCUTANEOUS at 07:37

## 2020-08-17 NOTE — PROGRESS NOTES
3:43 PM  08/17/20     Reason for Admission:  Fever                      RUR Score:  14%                   Plan for utilizing home health:     Referral sent to [phone] after patient was a list of home health care . He chose [address] choice letter signed and placed in chart. PCP: First and Last name:  Zaid Davila   Name of Practice:  [address] [place] [address]    Are you a current patient: Yes/No:  Yes    Approximate date of last visit:  June 2020 by [employer]    Can you participate in a virtual visit with your PCP:  Yes                     Current Advanced Directive/Advance Care Plan:  None at this time                         Transition of Care Plan:  Admitted due to fever . Will be discharged with IVAB. Referral sent to [facility] after patient chose [phone]. Patient lives with wife who will assist with care, Patient was independent with all ADL's prior to this admission. Patient works full time. Patient lives in a one story home. Patient gets his prescriptions filled at [facility] [facility] [phone]. 1. Patient will go home wit PICC line and IVAB  2. [facility] acceptance of referral  3. Wife is supportive  4. Pateint will need to follow up with PCP and specialties   5. CM will will continue to follow for discharge needs. Care Management Interventions  PCP Verified by CM:  Yes  Last Visit to PCP: 06/15/20  Mode of Transport at Discharge: Self  Transition of Care Consult (CM Consult): Discharge Planning, Home Health  Current Support Network: Lives with Spouse  Confirm Follow Up Transport: Family           Gunner Bailey RN, CCM

## 2020-08-17 NOTE — CONSULTS
Infectious Disease Consult    Impression/Plan   · Group B streptococcal bacteremia. Suspect related to left lower extremity cellulitis in the setting of chronic lower extremity lymphedema. Antibiotics will be narrowed to ceftriaxone. If repeat blood cultures remain sterile tomorrow a PICC line may be placed. Anticipate discharge on intravenous ceftriaxone. IV antibiotic orders are in the chart. Recommend referral to lymphedema clinic at [phone]  · RLE DVT diagnosed 5/20/2020. Continue Eliquis  · OTTO. Resolved. Anti-infectives:   1. Ceftriaxone    Subjective:   Date of Consultation:  August 17, 2020  Date of Admission: 8/15/2020   Referring Physician:     Camden Freeman is a 61 y.o. male with known HTN, DVT,HLD and DM2 who presents to the ER complaining of fever. The patient endorses malaise and a fever that began the morning prior to admission. No improvement with Tylenol so he he decided to come into the ER. The highest temp he notes is 103. 6F which was in the EMS. He admits to decrease PO intake with nausea but denies any cough, sob, body aches, tick exposure, CP, emesis, diarrhea, or sick contacts. 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 No one else in his home has these symptoms. Work-up has revealed group B streptococcal bacteremia. Of note, the patient has chronic lower extremity lymphedema and some left lower extremity extremity erythema which is likely the source of infection. He is currently on vancomycin, cefepime, and metronidazole.   The infectious diseases service has been asked to assist with antibiotic management    Patient Active Problem List   Diagnosis Code    DM (diabetes mellitus) ([facility] E11.9    Hyperlipemia E78.5    HTN (hypertension) I10    AR (allergic rhinitis) J30.9    Severe sepsis (HCC) A41.9, R65.20    SIRS (systemic inflammatory response syndrome) ([facility] R65.10    Lactic acidosis E87.2     Past Medical History:   Diagnosis Date    AR (allergic rhinitis) 2009    Diabetes ([facility]     Dr. Marjan Otto    Hypercholesterolemia     Hypertension       Family History   Problem Relation Age of Onset    Heart Failure Father     Diabetes Brother     Heart Failure Paternal Grandfather          age 37 AMI      Social History     Tobacco Use    Smoking status: Former Smoker     Packs/day: 1.00     Years: 15.00     Pack years: 15.00     Types: Cigarettes, Pipe, Cigars     Last attempt to quit: 3/12/1991     Years since quittin.4    Smokeless tobacco: Never Used   Substance Use Topics    Alcohol use: No     Past Surgical History:   Procedure Laterality Date    HX HEART CATHETERIZATION      normal      Prior to Admission medications    Medication Sig Start Date End Date Taking? Authorizing Provider   metoprolol succinate (TOPROL-XL) 50 mg XL tablet Take 50 mg by mouth daily. Yes Provider, Historical   insulin lispro (HUMALOG) 100 unit/mL kwikpen by SubCUTAneous route Before breakfast, lunch, and dinner. Sliding scale   Yes Provider, Historical   apixaban (ELIQUIS) 5 mg tablet Take 1 Tab by mouth two (2) times a day. 20  Yes Parker Lara NP   dilTIAZem ER (CARDIZEM LA) 240 mg Tb24 tablet Take 240 mg by mouth daily. Yes Provider, Historical   hydroCHLOROthiazide (HYDRODIURIL) 25 mg tablet Take 25 mg by mouth daily. Yes Provider, Historical   rosuvastatin (CRESTOR) 20 mg tablet Take 20 mg by mouth nightly. Yes Provider, Historical   aspirin delayed-release 81 mg tablet Take 81 mg by mouth daily. Yes Provider, Historical   insulin glargine (LANTUS,BASAGLAR) 100 unit/mL (3 mL) inpn 76 Units by SubCUTAneous route daily. 19  Yes Shamir Lockett, DO   metFORMIN (GLUCOPHAGE) 1,000 mg tablet Take 1,000 mg by mouth two (2) times daily (with meals).    Yes Provider, Historical     No Known Allergies     Review of Systems:  A comprehensive review of systems was negative except for that written in the History of Present Illness. Objective:   Blood pressure 151/79, pulse 65, temperature 98 °F (36.7 °C), resp. rate 18, height 6' 2\" (1.88 m), weight (!) 377 lb (171 kg), SpO2 94 %. Temp (24hrs), Av.2 °F (36.8 °C), Min:97.5 °F (36.4 °C), Max:99.2 °F (37.3 °C)       Exam:    General:  Alert, cooperative, well noursished, well developed, appears stated age   Eyes:  Sclera anicteric. Mouth/Throat: Mucous membranes moist   Neck: Supple   Lungs:   Clear to auscultation bilaterally   CV:  Regular rate and rhythm   Abdomen:    Obese, nontender, nondistended   Extremities:  Bilateral lower extremity edema. Chronic venous stasis changes. Erythema adjacent to the knee and upper calf. Warm to touch and tender to palpation   Skin:  No rash   Lymph nodes:    Musculoskeletal:    Lines/Devices:  Intact, no erythema, drainage or tenderness   Psych: Alert and oriented, normal mood affect given the setting       Data Review:   Recent Results (from the past 24 hour(s))   GLUCOSE, POC    Collection Time: 20  4:12 PM   Result Value Ref Range    Glucose (POC) 208 (H) 65 - 100 mg/dL    Performed by [id number] [id number], POC    Collection Time: 20  9:45 PM   Result Value Ref Range    Glucose (POC) 129 (H) 65 - 100 mg/dL    Performed by [facility]    METABOLIC PANEL, COMPREHENSIVE    Collection Time: 20  3:30 AM   Result Value Ref Range    Sodium 139 136 - 145 mmol/L    Potassium 3.1 (L) 3.5 - 5.1 mmol/L    Chloride 107 97 - 108 mmol/L    CO2 25 21 - 32 mmol/L    Anion gap 7 5 - 15 mmol/L    Glucose 106 (H) 65 - 100 mg/dL    BUN 13 6 - 20 MG/DL    Creatinine 0.88 0.70 - 1.30 MG/DL    BUN/Creatinine ratio 15 12 - 20      GFR est AA >60 >60 ml/min/1.73m2    GFR est non-AA >60 >60 ml/min/1.73m2    Calcium 8.3 (L) 8.5 - 10.1 MG/DL    Bilirubin, total 1.1 (H) 0.2 - 1.0 MG/DL    ALT (SGPT) 51 12 - 78 U/L    AST (SGOT) 50 (H) 15 - 37 U/L    Alk.  phosphatase 53 45 - 117 U/L    Protein, total 6.6 6.4 - 8.2 g/dL    Albumin 3.0 (L) 3.5 - 5.0 g/dL    Globulin 3.6 2.0 - 4.0 g/dL    A-G Ratio 0.8 (L) 1.1 - 2.2     CBC WITH AUTOMATED DIFF    Collection Time: 08/17/20  3:30 AM   Result Value Ref Range    WBC 7.3 4.1 - 11.1 K/uL    RBC 4.38 4.10 - 5.70 M/uL    HGB 12.3 12.1 - 17.0 g/dL    HCT 37.9 36.6 - 50.3 %    MCV 86.5 80.0 - 99.0 FL    MCH 28.1 26.0 - 34.0 PG    MCHC 32.5 30.0 - 36.5 g/dL    RDW 15.9 (H) 11.5 - 14.5 %    PLATELET 646 798 - 163 K/uL    MPV 10.3 8.9 - 12.9 FL    NRBC 0.0 0  WBC    ABSOLUTE NRBC 0.00 0.00 - 0.01 K/uL    NEUTROPHILS 59 32 - 75 %    LYMPHOCYTES 24 12 - 49 %    MONOCYTES 15 (H) 5 - 13 %    EOSINOPHILS 2 0 - 7 %    BASOPHILS 0 0 - 1 %    IMMATURE GRANULOCYTES 0 0.0 - 0.5 %    ABS. NEUTROPHILS 4.4 1.8 - 8.0 K/UL    ABS. LYMPHOCYTES 1.8 0.8 - 3.5 K/UL    ABS. MONOCYTES 1.1 (H) 0.0 - 1.0 K/UL    ABS. EOSINOPHILS 0.1 0.0 - 0.4 K/UL    ABS. BASOPHILS 0.0 0.0 - 0.1 K/UL    ABS. IMM.  GRANS. 0.0 0.00 - 0.04 K/UL    DF AUTOMATED     FERRITIN    Collection Time: 08/17/20  3:30 AM   Result Value Ref Range    Ferritin 190 26 - 388 NG/ML   LD    Collection Time: 08/17/20  3:30 AM   Result Value Ref Range     85 - 241 U/L   C REACTIVE PROTEIN, QT    Collection Time: 08/17/20  3:30 AM   Result Value Ref Range    C-Reactive protein 10.40 (H) 0.00 - 0.60 mg/dL   D DIMER    Collection Time: 08/17/20  3:30 AM   Result Value Ref Range    D-dimer 0.50 0.00 - 0.65 mg/L FEU   GLUCOSE, POC    Collection Time: 08/17/20  6:31 AM   Result Value Ref Range    Glucose (POC) 162 (H) 65 - 100 mg/dL    Performed by Gui Turner    GLUCOSE, POC    Collection Time: 08/17/20 11:51 AM   Result Value Ref Range    Glucose (POC) 256 (H) 65 - 100 mg/dL    Performed by Niesha Sexton PCT         Microbiology:      Studies:      Signed By: Posey Cooks, DO     August 17, 2020

## 2020-08-17 NOTE — PROGRESS NOTES
[facility] Vancomycin Pharmacy Consult 08/17/20  Vancomycin trough = 13.8 mcg/ml (drawn 12.13hrs post dose), extrapolates to a trough of 13.9 mcg/ml. Level is subtherapeutic   Goal Trough: 15-20 mcg/ml    Plan:   Will change Vancomycin to 2500 mg IV q12hr and recheck level soon    Thank you  Nolan Mcknight, PharmD  [phone]

## 2020-08-17 NOTE — PROGRESS NOTES
Post Discharge PICC and Antibiotic Orders    1. Diagnosis: Group B streptococcal bacteremia  2. Antibiotic: Ceftriaxone 2 g IV daily through 8/28/2020  3. Routine PICC/ Merary/ Portacath Care including PRN catheter flow management  4. Weekly labs:   _x__CBC/diff/platelets   _x__BUN/Creatinine   ___CPK   _x__AST/TotalBilirubin/AlkalinePhosphatase   ___CRP  5. Fax lab to [phone]  Call Critical Lab Values to [phone]  6. May send to IR for line evaluation or replacement [phone]  7. Home Health to pull PIC line at end of therapy or send to IR for Merary removal  8.   Allergies:  No Known Allergies      Mercedes Chávez DO

## 2020-08-17 NOTE — PROGRESS NOTES
[address] [facility] RESIDENCY PROGRAM  PROGRESS NOTE     8/17/2020  PCP: Victor Manuel Beck,      Assessment/Plan:     Wilian Salinas is a 61 y.o. male with a PMH of DM2, HTN, DVT, and HLD who is admitted for severe sepsis. Events overnight: None    Bacteremia Bcx: Group B strep. Repeat Bcx NG  - ID consulted: Discharge with Ceftriaxone 2g IV daily through 8/28  - PICC today  - Will narrow abx per ID  - Will refer to lymphedema clinic per ID    3/4 SIRS criteria with lactic acidosis (WBC 16.9, T 103.6 in EMS and RR 24) POA LA 3.6. Lacic acidosis resolved. Ddx: prostatitis/UTI, viral illness, abdominal pathology, or other etiology. CXR no acute process. CT abdo: cholelithiasis. S/p 3L of NS in ED. Lactic acid downtrending. RVP neg. Covid neg x2. Pt complains of frequency, urgency, foul smelling urine; UA wnl, Ucx pending. PNA labs neg. Started Eliquis 1 month ago, diarrhea every morning requiring Imodium.    -Vanc, Cefepime, and flagyl since 8/15    - Uxc pending  -Daily CMP/CBC    COVID negative patient denies any recent exposure, SOB, or cough. Presents with complaint of fever  - off precautions    DVT Diagnosed in 5/20/2020 in Proximal femoral vein spanning into popliteal in RLE  - Continue home Eliquis 5mg BID     Elevated Cr Resolved. Cr 1.42 POA. No clear baseline but in 2019 was 0.98. Likely elevated d/t decrease po intake. Addition 2L NS given. -Strict I&O, monitor to keep urine output > 30 mL/hr  -Avoid nephrotoxic agents  -Monitor daily on CMP     Hypertension: BP on admission 148/52   - Continue home Diltiazem 240 mg, HCTZ 25 mg, metoprolol 50mg  daily     DMII - A1c 7.7 POA. On metformin 1000 mg BID & Lantus 76 units daily with home sliding scale. Patient endorses he uses 60 units per meal   - Holding home medications of metformin 1000 mg BID  - Start on Lispro 48 units with meals, Lantus 60 units at bedtime. (80 percent)  - Insulin Sliding Scale normal sensitivity with AC&HS glucose checks.   - Hypoglycemia protocols ordered.     Hyperlipidemia: Mildly elevated triglycerides. Holding home Crestor 20 mg daily   - Lipitor 20mg daily     Prolonged QTc  POA  -Avoid prolonging agents     Obesity BMI Body mass index is 48.4 kg/m². - Encouraging lifestyle modifications and further follow up outpatient.         FEN/GI - Diabetic diet. Activity - Ambulate as tolerated  DVT prophylaxis - Home Eliquis  GI prophylaxis - Not indicated at this time  Fall prophylaxis - Not indicated at this time. Disposition - Admit to Remote Telemetry. Plan to d/c to TBD. Code Status - Partial, discussed with patient / caregivers. Next of Kin Name and Contact Karis Aguilar [phone]       Subjective:   Patient feels fine this am, and states that his legs are not painful. No sob, cp, abdominal pain, or dizziness. Objective:   Physical examination  Patient Vitals for the past 24 hrs:   Temp Pulse Resp BP SpO2   20 1501 98.2 °F (36.8 °C) 71 18 107/63 95 %   20 1459  70      20 1152 98 °F (36.7 °C) 65 18 151/79 94 %   20 0720 97.9 °F (36.6 °C) 68 18 143/81 95 %   20 0702  65      20 0400  76      20 0311 97.5 °F (36.4 °C) 76 18 134/72 96 %   20 0000  69      20 2327  69      20 2326 98.3 °F (36.8 °C) 74 18 159/72 93 %   20 2049 99.2 °F (37.3 °C) 77 18 152/66 95 %   20 2000  77         Temp (24hrs), Av.2 °F (36.8 °C), Min:97.5 °F (36.4 °C), Max:99.2 °F (37.3 °C)         O2 Device: Room air    Date 20 - 2059 20 07 - 20 0659   Shift 5311-0117 7521-3706 24 Hour Total 7844-2025 8393-8033 24 Hour Total   INTAKE   P.O. 1000  1000        P. O. 1000  1000      Shift Total(mL/kg) 1000(5.8)  1000(5.8)      OUTPUT   Urine(mL/kg/hr) 1900(0.9)  1900(0.5)        Urine Voided 1900  1900      Shift Total(mL/kg) 1900(11.1)  1900(11.1)      NET -900  -900      Weight (kg) 171 171 171 171 171 171 Physical Exam   General: No acute distress. Alert and cooperative. Respiratory: Clear to auscultation bilaterally. No wheezes, rales, or rhonchi. Cardiovascular: RRR. Pulses strong and regular. No murmurs. GI: Soft, nontender, nondistended, no masses or organomegaly. Extremities: 3+ nonpitting edema. Chronic hemosiderin changes. Some redness above R compression stocking. Distal pulses intact  Neuro: Oriented x3. Data Review:     CBC:  Recent Labs     08/17/20  0330 08/15/20  2328 08/15/20  0344   WBC 7.3 13.1* 16.9*   HGB 12.3 12.9 13.5   HCT 37.9 39.5 40.2    243 529     Metabolic Panel:  Recent Labs     08/17/20  0330 08/15/20  2328 08/15/20  0344    133* 131*   K 3.1* 3.5 3.7    104 97   CO2 25 21 23   BUN 13 16 19   CREA 0.88 0.95 1.42*   * 99 367*   CA 8.3* 8.3* 8.5   MG  --   --  2.0   ALB 3.0* 3.2* 3.5   TBILI 1.1* 1.5* 2.4*   ALT 51 20 26     Micro:  Lab Results   Component Value Date/Time    Culture result: NO GROWTH 1 DAY 08/15/2020 11:28 PM    Culture result: No growth (<1,000 CFU/ML) 08/15/2020 04:45 AM    Culture result: (A) 08/15/2020 03:44 AM     STREPTOCOCCI, BETA HEMOLYTIC GROUP B GROWING IN 2 OF 4 BOTTLES DRAWN ([facility] SITE)    Culture result: (A) 08/15/2020 03:44 AM     PRELIMINARY REPORT OF GRAM POSITIVE COCCI IN CHAINS GROWING IN 2 OF 4 BOTTLES DRAWN CALLED TO AND READ BACK BY MS ERICKA RN ON 8/15/20 AT 2338 ([facility] 435). MS    Culture result: REMAINING BOTTLE(S) HAS/HAVE NO GROWTH SO FAR 08/15/2020 03:44 AM     Imaging:  Cta Chest W Or W Wo Cont    Result Date: 8/15/2020  Clinical indication: Hypoxia, fever, abdominal pain. Localizer obtained without contrast at the level of the pulmonary arteries. Fast injection rate of 100 cc of Isovue-370 with review of the raw data and MIP reconstructions, no prior. CT dose reduction was achieved through the use of a standardized protocol tailored for this examination and automatic exposure control for dose modulation . . Axial CT scan of the abdomen and pelvis obtained after intravenous contrast. No prior. There are no pulmonary emboli. Aorta show no focal findings. There is no pericardial pleural effusion. No shift or pneumothorax There is no mediastinal adenopathy, no parenchymal mass. Liver and spleen are normal in size. No focal mass. Gallstones are seen in a nondistended gallbladder. Adrenals appear unremarkable. The pancreas does appear unremarkable. Kidneys are unobstructed. There is no free air or free fluid. Small fat-containing umbilical hernia. The bladder is midline. There is no bowel wall thickening or obstruction. No abdominal or pelvic adenopathy. IMPRESSION: No acute abnormalities. Cholelithiasis    Ct Abd Pelv W Cont    Result Date: 8/15/2020  Clinical indication: Hypoxia, fever, abdominal pain. Localizer obtained without contrast at the level of the pulmonary arteries. Fast injection rate of 100 cc of Isovue-370 with review of the raw data and MIP reconstructions, no prior. CT dose reduction was achieved through the use of a standardized protocol tailored for this examination and automatic exposure control for dose modulation . [facility] Axial CT scan of the abdomen and pelvis obtained after intravenous contrast. No prior. There are no pulmonary emboli. Aorta show no focal findings. There is no pericardial pleural effusion. No shift or pneumothorax There is no mediastinal adenopathy, no parenchymal mass. Liver and spleen are normal in size. No focal mass. Gallstones are seen in a nondistended gallbladder. Adrenals appear unremarkable. The pancreas does appear unremarkable. Kidneys are unobstructed. There is no free air or free fluid. Small fat-containing umbilical hernia. The bladder is midline. There is no bowel wall thickening or obstruction. No abdominal or pelvic adenopathy. IMPRESSION: No acute abnormalities. Cholelithiasis    Xr Chest Port    Result Date: 8/15/2020  INDICATION: Fever. Hypoxia.  FINDINGS: AP portable imaging of the chest performed at 3:58 AM demonstrates a normal cardiomediastinal silhouette. The lungs are clear bilaterally. No significant osseous abnormalities are seen. IMPRESSION: No evidence of acute cardiopulmonary process.      Medications reviewed  Current Facility-Administered Medications   Medication Dose Route Frequency    vancomycin (VANCOCIN) 2,500 mg in 0.9% sodium chloride 500 mL IVPB  2,500 mg IntraVENous Q12H    sodium chloride (NS) flush 5-10 mL  5-10 mL IntraVENous PRN    sodium chloride (NS) flush 5-40 mL  5-40 mL IntraVENous Q8H    sodium chloride (NS) flush 5-40 mL  5-40 mL IntraVENous PRN    acetaminophen (TYLENOL) tablet 650 mg  650 mg Oral Q6H PRN    Or    acetaminophen (TYLENOL) suppository 650 mg  650 mg Rectal Q6H PRN    glucose chewable tablet 16 g  4 Tab Oral PRN    dextrose (D50W) injection syrg 12.5-25 g  25-50 mL IntraVENous PRN    glucagon (GLUCAGEN) injection 1 mg  1 mg IntraMUSCular PRN    zinc sulfate (ZINCATE) 220 (50) mg capsule 1 Cap  1 Cap Oral DAILY    ascorbic acid (vitamin C) (VITAMIN C) tablet 500 mg  500 mg Oral BID    atorvastatin (LIPITOR) tablet 20 mg  20 mg Oral QHS    metroNIDAZOLE (FLAGYL) IVPB premix 500 mg  500 mg IntraVENous Q12H    apixaban (ELIQUIS) tablet 5 mg  5 mg Oral BID    metoprolol succinate (TOPROL-XL) XL tablet 50 mg  50 mg Oral DAILY    hydroCHLOROthiazide (HYDRODIURIL) tablet 25 mg  25 mg Oral DAILY    dilTIAZem ER (CARDIZEM CD) capsule 240 mg  240 mg Oral DAILY    insulin lispro (HUMALOG) injection   SubCUTAneous AC&HS    insulin glargine (LANTUS) injection 76 Units  76 Units SubCUTAneous QHS    insulin lispro (HUMALOG) injection 60 Units  60 Units SubCUTAneous TIDAC    cefepime (MAXIPIME) 2 g in 0.9% sodium chloride (MBP/ADV) 100 mL  2 g IntraVENous Q8H         Signed:   Alberto Tadeo MD   Resident, Family Medicine

## 2020-08-18 ENCOUNTER — APPOINTMENT (OUTPATIENT)
Dept: GENERAL RADIOLOGY | Age: 60
DRG: 872 | End: 2020-08-18
Attending: STUDENT IN AN ORGANIZED HEALTH CARE EDUCATION/TRAINING PROGRAM
Payer: COMMERCIAL

## 2020-08-18 VITALS
OXYGEN SATURATION: 97 % | HEART RATE: 90 BPM | SYSTOLIC BLOOD PRESSURE: 123 MMHG | TEMPERATURE: 97.8 F | WEIGHT: 315 LBS | HEIGHT: 74 IN | RESPIRATION RATE: 18 BRPM | DIASTOLIC BLOOD PRESSURE: 72 MMHG | BODY MASS INDEX: 40.43 KG/M2

## 2020-08-18 LAB
ALBUMIN SERPL-MCNC: 3 G/DL (ref 3.5–5)
ALBUMIN/GLOB SERPL: 0.8 {RATIO} (ref 1.1–2.2)
ALP SERPL-CCNC: 60 U/L (ref 45–117)
ALT SERPL-CCNC: 67 U/L (ref 12–78)
ANION GAP SERPL CALC-SCNC: 7 MMOL/L (ref 5–15)
AST SERPL-CCNC: 48 U/L (ref 15–37)
BASOPHILS # BLD: 0 K/UL (ref 0–0.1)
BASOPHILS NFR BLD: 0 % (ref 0–1)
BILIRUB SERPL-MCNC: 1.1 MG/DL (ref 0.2–1)
BUN SERPL-MCNC: 13 MG/DL (ref 6–20)
BUN/CREAT SERPL: 15 (ref 12–20)
CALCIUM SERPL-MCNC: 8.2 MG/DL (ref 8.5–10.1)
CHLORIDE SERPL-SCNC: 105 MMOL/L (ref 97–108)
CO2 SERPL-SCNC: 26 MMOL/L (ref 21–32)
CREAT SERPL-MCNC: 0.88 MG/DL (ref 0.7–1.3)
CRP SERPL-MCNC: 4.66 MG/DL (ref 0–0.6)
DIFFERENTIAL METHOD BLD: ABNORMAL
EOSINOPHIL # BLD: 0.3 K/UL (ref 0–0.4)
EOSINOPHIL NFR BLD: 4 % (ref 0–7)
ERYTHROCYTE [DISTWIDTH] IN BLOOD BY AUTOMATED COUNT: 15.9 % (ref 11.5–14.5)
GLOBULIN SER CALC-MCNC: 3.9 G/DL (ref 2–4)
GLUCOSE BLD STRIP.AUTO-MCNC: 184 MG/DL (ref 65–100)
GLUCOSE BLD STRIP.AUTO-MCNC: 185 MG/DL (ref 65–100)
GLUCOSE SERPL-MCNC: 172 MG/DL (ref 65–100)
HCT VFR BLD AUTO: 38.8 % (ref 36.6–50.3)
HGB BLD-MCNC: 12.4 G/DL (ref 12.1–17)
IMM GRANULOCYTES # BLD AUTO: 0 K/UL (ref 0–0.04)
IMM GRANULOCYTES NFR BLD AUTO: 0 % (ref 0–0.5)
LYMPHOCYTES # BLD: 1.8 K/UL (ref 0.8–3.5)
LYMPHOCYTES NFR BLD: 23 % (ref 12–49)
MCH RBC QN AUTO: 27.6 PG (ref 26–34)
MCHC RBC AUTO-ENTMCNC: 32 G/DL (ref 30–36.5)
MCV RBC AUTO: 86.4 FL (ref 80–99)
MONOCYTES # BLD: 0.9 K/UL (ref 0–1)
MONOCYTES NFR BLD: 11 % (ref 5–13)
NEUTS SEG # BLD: 4.8 K/UL (ref 1.8–8)
NEUTS SEG NFR BLD: 62 % (ref 32–75)
NRBC # BLD: 0 K/UL (ref 0–0.01)
NRBC BLD-RTO: 0 PER 100 WBC
PLATELET # BLD AUTO: 218 K/UL (ref 150–400)
PMV BLD AUTO: 10 FL (ref 8.9–12.9)
POTASSIUM SERPL-SCNC: 3.6 MMOL/L (ref 3.5–5.1)
PROT SERPL-MCNC: 6.9 G/DL (ref 6.4–8.2)
RBC # BLD AUTO: 4.49 M/UL (ref 4.1–5.7)
SERVICE CMNT-IMP: ABNORMAL
SERVICE CMNT-IMP: ABNORMAL
SODIUM SERPL-SCNC: 138 MMOL/L (ref 136–145)
WBC # BLD AUTO: 7.7 K/UL (ref 4.1–11.1)

## 2020-08-18 PROCEDURE — 85025 COMPLETE CBC W/AUTO DIFF WBC: CPT

## 2020-08-18 PROCEDURE — 74011250637 HC RX REV CODE- 250/637: Performed by: STUDENT IN AN ORGANIZED HEALTH CARE EDUCATION/TRAINING PROGRAM

## 2020-08-18 PROCEDURE — 99238 HOSP IP/OBS DSCHRG MGMT 30/<: CPT | Performed by: FAMILY MEDICINE

## 2020-08-18 PROCEDURE — 74011250636 HC RX REV CODE- 250/636: Performed by: FAMILY MEDICINE

## 2020-08-18 PROCEDURE — 74011000258 HC RX REV CODE- 258: Performed by: FAMILY MEDICINE

## 2020-08-18 PROCEDURE — 80053 COMPREHEN METABOLIC PANEL: CPT

## 2020-08-18 PROCEDURE — C1894 INTRO/SHEATH, NON-LASER: HCPCS

## 2020-08-18 PROCEDURE — 76937 US GUIDE VASCULAR ACCESS: CPT

## 2020-08-18 PROCEDURE — 74011250636 HC RX REV CODE- 250/636: Performed by: STUDENT IN AN ORGANIZED HEALTH CARE EDUCATION/TRAINING PROGRAM

## 2020-08-18 PROCEDURE — 02HV33Z INSERTION OF INFUSION DEVICE INTO SUPERIOR VENA CAVA, PERCUTANEOUS APPROACH: ICD-10-PCS | Performed by: FAMILY MEDICINE

## 2020-08-18 PROCEDURE — 74011000258 HC RX REV CODE- 258: Performed by: STUDENT IN AN ORGANIZED HEALTH CARE EDUCATION/TRAINING PROGRAM

## 2020-08-18 PROCEDURE — 94760 N-INVAS EAR/PLS OXIMETRY 1: CPT

## 2020-08-18 PROCEDURE — 99232 SBSQ HOSP IP/OBS MODERATE 35: CPT | Performed by: INTERNAL MEDICINE

## 2020-08-18 PROCEDURE — 36415 COLL VENOUS BLD VENIPUNCTURE: CPT

## 2020-08-18 PROCEDURE — 74011636637 HC RX REV CODE- 636/637: Performed by: STUDENT IN AN ORGANIZED HEALTH CARE EDUCATION/TRAINING PROGRAM

## 2020-08-18 PROCEDURE — 77030018786 HC NDL GD F/USND BARD -B

## 2020-08-18 PROCEDURE — 82962 GLUCOSE BLOOD TEST: CPT

## 2020-08-18 PROCEDURE — 86140 C-REACTIVE PROTEIN: CPT

## 2020-08-18 PROCEDURE — C1751 CATH, INF, PER/CENT/MIDLINE: HCPCS

## 2020-08-18 PROCEDURE — 36573 INSJ PICC RS&I 5 YR+: CPT | Performed by: STUDENT IN AN ORGANIZED HEALTH CARE EDUCATION/TRAINING PROGRAM

## 2020-08-18 RX ADMIN — APIXABAN 5 MG: 5 TABLET, FILM COATED ORAL at 09:09

## 2020-08-18 RX ADMIN — METRONIDAZOLE 500 MG: 500 INJECTION, SOLUTION INTRAVENOUS at 00:11

## 2020-08-18 RX ADMIN — SODIUM CHLORIDE 2 G: 900 INJECTION, SOLUTION INTRAVENOUS at 12:36

## 2020-08-18 RX ADMIN — ZINC SULFATE 220 MG (50 MG) CAPSULE 1 CAPSULE: CAPSULE at 09:09

## 2020-08-18 RX ADMIN — INSULIN LISPRO 3 UNITS: 100 INJECTION, SOLUTION INTRAVENOUS; SUBCUTANEOUS at 09:10

## 2020-08-18 RX ADMIN — INSULIN LISPRO 60 UNITS: 100 INJECTION, SOLUTION INTRAVENOUS; SUBCUTANEOUS at 09:10

## 2020-08-18 RX ADMIN — HYDROCHLOROTHIAZIDE 25 MG: 25 TABLET ORAL at 09:09

## 2020-08-18 RX ADMIN — DILTIAZEM HYDROCHLORIDE 240 MG: 240 CAPSULE, COATED, EXTENDED RELEASE ORAL at 09:09

## 2020-08-18 RX ADMIN — Medication 500 MG: at 09:09

## 2020-08-18 RX ADMIN — INSULIN LISPRO 60 UNITS: 100 INJECTION, SOLUTION INTRAVENOUS; SUBCUTANEOUS at 12:36

## 2020-08-18 RX ADMIN — CEFEPIME HYDROCHLORIDE 2 G: 2 INJECTION, POWDER, FOR SOLUTION INTRAVENOUS at 07:48

## 2020-08-18 RX ADMIN — METRONIDAZOLE 500 MG: 500 INJECTION, SOLUTION INTRAVENOUS at 12:36

## 2020-08-18 RX ADMIN — Medication 10 ML: at 07:48

## 2020-08-18 RX ADMIN — INSULIN LISPRO 3 UNITS: 100 INJECTION, SOLUTION INTRAVENOUS; SUBCUTANEOUS at 12:36

## 2020-08-18 RX ADMIN — VANCOMYCIN HYDROCHLORIDE 2500 MG: 10 INJECTION, POWDER, LYOPHILIZED, FOR SOLUTION INTRAVENOUS at 04:34

## 2020-08-18 RX ADMIN — Medication 10 ML: at 09:11

## 2020-08-18 RX ADMIN — METOPROLOL SUCCINATE 50 MG: 50 TABLET, EXTENDED RELEASE ORAL at 09:09

## 2020-08-18 NOTE — PROGRESS NOTES
8/18/2020 3:35 PM Due to pt's delay in discharge, home iv abx teaching will take place on 8/19 at 9am at pt's home. Confirmed with pt he would rather this than teaching to be done at [facility]. Confirmed with Seng Alatorre at [facility] he will be at pt's home at [facility] on 8/19 to teach pt's wife and pt on iv abx.     8/18/2020 12:00 PM Picc report sent with updates to [facility]. Confirmed with Seng Alatorre at [facility] he will contact pt and plan to teach pt and pt's wife at home at [facility] today. Pt was agreeable to this timing. Pt reported he will plan to have ihs wife be to [facility] around 1PM to transport him home. Pt's RN made aware pt will discharge after ceftriaxone dose. 8/18/2020  8:57 AM Picc line order in, called to picc team, unsure of timing for placing line. [facility] can accept pt for iv abx, they will have pt come to their office in [place] for picc line dressing changes and labs. Met with pt, he was agreeable to discharge today. Pt was agreeable to going to [facility] office for picc line dressing changes and labs. Pt requested teaching at home for iv abx. CM discussed reasoning for teaching iv abx at hospital prior to discharge. Pt declined teaching at hospital and requested teaching to be at home this afternoon when his wife can be present. Confirmed with Seng Alatorre at [facility], pt can be taught at home for iv abx this afternoon after discharge. Pt will need 1st dose of iv abx prior to discharge, requested order from KRIS HENRY and pt's primary team. CM will follow up.     8/18/2020  7:55 AM Anticipating discharge home today. Called and lvm with [facility] and also sent message via [employer] requesting confirmation they can accept pt for home iv abx and to provide nursing as well. CM will follow up. URBAN Espino    Transitions of Care Plan, discharge today:  1. Picc line placed  2. Will need 1st dose of iv Ceftriaxone prior to discharge  3. Home with wife today, wife will transport home  4.  Pt to be taught for iv abx at home at [facility] through [place] [id number]  5. Pt to have labs and picc line dressing changes at [place] [id number] office in [place] after discharge    Care Management Interventions  PCP Verified by CM:  Yes  Last Visit to PCP: 06/15/20  Mode of Transport at Discharge: Self  Transition of Care Consult (CM Consult): Discharge Planning, Home Health  Current Support Network: Lives with Spouse  Confirm Follow Up Transport: Family  Discharge Location  Discharge Placement: Home with infusion therapy

## 2020-08-18 NOTE — DISCHARGE SUMMARY
[address] [facility] [address] [place] [address]   Office [phone]  Fax [phone]       Discharge / Transfer / Off-Service Note     Name: Emre Doran MRN: [id number]  Sex: Male   YOB: 1960  Age: 61 y.o. PCP: Simeon Barriga DO     Date of admission: 8/15/2020  Date of discharge/transfer: 8/18/2020    Attending physician at admission: Georgina Phelps. Attending physician at discharge/transfer: Eric Franklin MD  Resident physician at discharge/transfer: Adam Nair MD     Consultants during hospitalization  IP CONSULT TO [facility] TO INFECTIOUS DISEASES     Admission diagnoses   Severe sepsis ([facility] [A41.9, R65.20]    Recommended follow-up after discharge    1. PCP-Shamir Lockett DO  2. Lymphedema Clinic- Please call to make appointment     Things to follow up on with PCP:    Andrea    ----------------------------------------------------------------------------------------------------------------------------  The patient was well enough to be discharged from the hospital. However, because they were inpatient in a hospital, they are at greater risk of having been exposed to the coronavirus. PLEASE stay inside and self-quarantine for 14 days to prevent further spread of the coronavirus.  ------------------------------------------------------------------------------------------------------------------    Medication Changes:    START  Ceftriaxone 2g through the PICC line every day until 8/28/2020     No other changes were made to your medications, please take all your other home medicines as previously prescribed. History of Present Illness    As per admitting provider, Dr. Amanda Tellez:   Srinivas Hanson is a 61 y.o. male with known HTN, DVT,HLD and DM2 who presents to the ER complaining of fever. The patient endorses malaise and a fever that began yesterday morning. His fever remained throughout the day even with Tylenol so he he decided to come into the ER.  The highest temp he notes is 103. 6F which was in the EMS. He admits to decrease PO intake with nausea but denies any cough, sob, body aches, tick exposure, CP, emesis, diarrhea, or sick contacts. 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 No one else in his home has these symptoms. \"          Hospital course    Bacteremia Bcx: Group B strep. Repeat Bcx NG  - Discharge with PICC and ceftriaxone 2 g IV until 8/28  - Follow up with lymphedema clinic     3/4 SIRS criteria with lactic acidosis 2/2 bacteremia. CXR no acute process. CT abdo: cholelithiasis. RVP neg. Ucx NG. PNA labs neg. Started Eliquis 1 month ago, diarrhea every morning requiring Imodium.       COVID negative      DVT Diagnosed in 5/20/2020 in Proximal femoral vein spanning into popliteal in RLE  - Continue home Eliquis 5mg BID     Elevated Cr Resolved. Cr 1.42 POA. No clear baseline but in 2019 was 0.98. Likely elevated d/t decrease po intake.      Hypertension: BP on admission 148/52   - Continue home Diltiazem 240 mg, HCTZ 25 mg, metoprolol 50mg  daily     DMII - A1c 7.7 POA. On metformin 1000 mg BID & Lantus 76 units daily with home sliding scale. Patient endorses he uses 60 units per meal   - Continue metformin 1000 mg BID and home insulin regimen     Hyperlipidemia: Mildly elevated triglycerides. - Continue Crestor 20mg daily     Prolonged QTc  POA  -Avoid prolonging agents     Obesity BMI Body mass index is 48.4 kg/m². - Encouraging lifestyle modifications and further follow up outpatient.            Physical exam at discharge:  Physical Exam   General: No acute distress. Alert and cooperative. Respiratory: Clear to auscultation bilaterally. No wheezes, rales, or rhonchi. Cardiovascular: RRR. Pulses strong and regular. No murmurs. GI: Soft, nontender, nondistended, no masses or organomegaly. Extremities: 3+ nonpitting edema. Chronic hemosiderin changes. Some redness above R compression stocking.  Distal pulses intact  Neuro: Oriented x3.        Vitals: Reviewed. Patient Vitals for the past 12 hrs:   Temp Pulse Resp BP SpO2   08/18/20 0734 98.1 °F (36.7 °C) 64 18 129/75 95 %   08/18/20 0700  71      08/18/20 0516 97.9 °F (36.6 °C) 75 18 137/77 95 %   08/17/20 2330 98.2 °F (36.8 °C) 80 18 145/74 93 %   08/17/20 2312  73          Condition at discharge: Stable. Labs  Recent Labs     08/18/20  0526 08/17/20  0330 08/15/20  2328   WBC 7.7 7.3 13.1*   HGB 12.4 12.3 12.9   HCT 38.8 37.9 39.5    178 189     Recent Labs     08/18/20  0526 08/17/20  0330 08/15/20  2328    139 133*   K 3.6 3.1* 3.5    107 104   CO2 26 25 21   BUN 13 13 16   CREA 0.88 0.88 0.95   * 106* 99   CA 8.2* 8.3* 8.3*     Recent Labs     08/18/20  0526 08/17/20  0330 08/15/20  2328   ALT 67 51 20   AP 60 53 56   TBILI 1.1* 1.1* 1.5*   TP 6.9 6.6 7.2   ALB 3.0* 3.0* 3.2*   GLOB 3.9 3.6 4.0     Recent Labs     08/18/20  0636 08/17/20  2117 08/17/20  1604 08/17/20  1151 08/17/20  0631 08/17/20  0330  08/15/20  2328   FERR  --   --   --   --   --  190  --  170   GLUCPOC 185* 132* 200* 256* 162*  --    < >  --     < > = values in this interval not displayed. Micro:  Lab Results   Component Value Date/Time    Culture result: NO GROWTH 2 DAYS 08/15/2020 11:28 PM    Culture result: No growth (<1,000 CFU/ML) 08/15/2020 04:45 AM    Culture result: (A) 08/15/2020 03:44 AM     STREPTOCOCCI, BETA HEMOLYTIC GROUP B GROWING IN 2 OF 4 BOTTLES DRAWN ([facility] SITE)    Culture result: (A) 08/15/2020 03:44 AM     PRELIMINARY REPORT OF GRAM POSITIVE COCCI IN CHAINS GROWING IN 2 OF 4 BOTTLES DRAWN CALLED TO AND READ BACK BY MS Lanny Aggarwal RN ON 8/15/20 AT 2338 ([facility] 435). MS    Culture result: REMAINING BOTTLE(S) HAS/HAVE NO GROWTH SO FAR 08/15/2020 03:44 AM       Imaging:  Cta Chest W Or W Wo Cont    Result Date: 8/15/2020  Clinical indication: Hypoxia, fever, abdominal pain. Localizer obtained without contrast at the level of the pulmonary arteries.  Fast injection rate of 100 cc of Isovue-370 with review of the raw data and MIP reconstructions, no prior. CT dose reduction was achieved through the use of a standardized protocol tailored for this examination and automatic exposure control for dose modulation . Shirley Frizzle Axial CT scan of the abdomen and pelvis obtained after intravenous contrast. No prior. There are no pulmonary emboli. Aorta show no focal findings. There is no pericardial pleural effusion. No shift or pneumothorax There is no mediastinal adenopathy, no parenchymal mass. Liver and spleen are normal in size. No focal mass. Gallstones are seen in a nondistended gallbladder. Adrenals appear unremarkable. The pancreas does appear unremarkable. Kidneys are unobstructed. There is no free air or free fluid. Small fat-containing umbilical hernia. The bladder is midline. There is no bowel wall thickening or obstruction. No abdominal or pelvic adenopathy. IMPRESSION: No acute abnormalities. Cholelithiasis    Ct Abd Pelv W Cont    Result Date: 8/15/2020  Clinical indication: Hypoxia, fever, abdominal pain. Localizer obtained without contrast at the level of the pulmonary arteries. Fast injection rate of 100 cc of Isovue-370 with review of the raw data and MIP reconstructions, no prior. CT dose reduction was achieved through the use of a standardized protocol tailored for this examination and automatic exposure control for dose modulation . Shirley Frizzle Axial CT scan of the abdomen and pelvis obtained after intravenous contrast. No prior. There are no pulmonary emboli. Aorta show no focal findings. There is no pericardial pleural effusion. No shift or pneumothorax There is no mediastinal adenopathy, no parenchymal mass. Liver and spleen are normal in size. No focal mass. Gallstones are seen in a nondistended gallbladder. Adrenals appear unremarkable. The pancreas does appear unremarkable. Kidneys are unobstructed. There is no free air or free fluid.  Small fat-containing umbilical hernia. The bladder is midline. There is no bowel wall thickening or obstruction. No abdominal or pelvic adenopathy. IMPRESSION: No acute abnormalities. Cholelithiasis    Xr Chest Port    Result Date: 8/15/2020  INDICATION: Fever. Hypoxia. FINDINGS: AP portable imaging of the chest performed at 3:58 AM demonstrates a normal cardiomediastinal silhouette. The lungs are clear bilaterally. No significant osseous abnormalities are seen. IMPRESSION: No evidence of acute cardiopulmonary process. Procedures / Diagnostic Studies  · None    Chronic diagnoses   Problem List as of 8/18/2020 Date Reviewed: 8/6/2019          Codes Class Noted - Resolved    Severe sepsis ([place] [address].) ICD-10-CM: A41.9, R65.20  ICD-9-CM: 038.9, 995.92  8/15/2020 - Present        * (Principal) SIRS (systemic inflammatory response syndrome) ([place] [address].) ICD-10-CM: R65.10  ICD-9-CM: 995.90  8/15/2020 - Present        Lactic acidosis ICD-10-CM: E87.2  ICD-9-CM: 276.2  8/15/2020 - Present        AR (allergic rhinitis) ICD-10-CM: J30.9  ICD-9-CM: 477.9  6/11/2009 - Present        Hyperlipemia (Chronic) ICD-10-CM: E78.5  ICD-9-CM: 272.4  3/12/2009 - Present        HTN (hypertension) (Chronic) ICD-10-CM: I10  ICD-9-CM: 401.9  3/12/2009 - Present        DM (diabetes mellitus) ([place] [address].) (Chronic) ICD-10-CM: E11.9  ICD-9-CM: 250.00  3/12/1999 - Present              Current Discharge Medication List      CONTINUE these medications which have NOT CHANGED    Details   metoprolol succinate (TOPROL-XL) 50 mg XL tablet Take 50 mg by mouth daily. insulin lispro (HUMALOG) 100 unit/mL kwikpen by SubCUTAneous route Before breakfast, lunch, and dinner. Sliding scale      apixaban (ELIQUIS) 5 mg tablet Take 1 Tab by mouth two (2) times a day. Qty: 60 Tab, Refills: 5      dilTIAZem ER (CARDIZEM LA) 240 mg Tb24 tablet Take 240 mg by mouth daily. hydroCHLOROthiazide (HYDRODIURIL) 25 mg tablet Take 25 mg by mouth daily.       rosuvastatin (CRESTOR) 20 mg tablet Take 20 mg by mouth nightly. aspirin delayed-release 81 mg tablet Take 81 mg by mouth daily. insulin glargine (LANTUS,BASAGLAR) 100 unit/mL (3 mL) inpn 76 Units by SubCUTAneous route daily. Associated Diagnoses: DM (diabetes mellitus) ([facility]      metFORMIN (GLUCOPHAGE) 1,000 mg tablet Take 1,000 mg by mouth two (2) times daily (with meals). Diet:  Diabetic diet.     Activity:  As tolerated     Disposition: Home or Self Care    Discharge instructions to patient/family  Please seek medical attention for any new or worsening symptoms particularly fever, chest pain, shortness of breath, abdominal pain, nausea, vomiting    Follow up plans/appointments  Follow-up Information    None          Leyla Frazier MD  Family Medicine Resident       For Billing    Chief Complaint   Patient presents with    Fever    Fatigue       Hospital Problems  Date Reviewed: 8/6/2019          Codes Class Noted POA    Severe sepsis ([facility] ICD-10-CM: A41.9, R65.20  ICD-9-CM: 038.9, 995.92  8/15/2020 Unknown        * (Principal) SIRS (systemic inflammatory response syndrome) ([facility].) ICD-10-CM: R65.10  ICD-9-CM: 995.90  8/15/2020 Unknown        Lactic acidosis ICD-10-CM: E87.2  ICD-9-CM: 276.2  8/15/2020 Unknown        Hyperlipemia (Chronic) ICD-10-CM: E78.5  ICD-9-CM: 272.4  3/12/2009 Yes        HTN (hypertension) (Chronic) ICD-10-CM: I10  ICD-9-CM: 401.9  3/12/2009 Yes        DM (diabetes mellitus) ([facility] (Chronic) ICD-10-CM: E11.9  ICD-9-CM: 250.00  3/12/1999 Yes

## 2020-08-18 NOTE — PROGRESS NOTES
2:46 PM    Received a call from telemetry noting abnormal heart rhythm. Dr. Elio Escamilla pgy2 and I reviewed telemetry, noted approximately 2 minutes of intermittent PVCs with some PACs. Patient is completely asymptomatic and stable for discharge.     Lainey Menjivar MD

## 2020-08-18 NOTE — DISCHARGE INSTRUCTIONS
HOME DISCHARGE INSTRUCTIONS    Natanael Pulido / [id number] : 1960    Admission date: 8/15/2020 Discharge date: 2020 9:34 AM     Please bring this form with you to show your care provider at your follow-up appointment. Primary care provider:  Garrison Roger DO    Discharging provider:  Annette Bergeron MD  - Family Medicine Resident  Michael Mccray MD - Family Medicine Attending      You have been admitted to the hospital with the following diagnoses:    ACUTE DIAGNOSES:  Severe sepsis ([facility] [address] [A41.9, R65.20]    . . . . . . . . . . . . . . . . . . . . . . . . . . . . . . . . . . . . . . . . . . . . . . . . . . . . . . . . . . . . . . . . . . . . . . . .   You are well enough to be discharged from the hospital. However, because you were inpatient in a hospital, you are at greater risk of having been exposed to the coronavirus. PLEASE stay inside and self-quarantine for 14 days to prevent further spread of the coronavirus. . . . . . . . . . . . . . . . . . . . . . . . . . . . . . . . . . . . . . . . . . . . . . . . . . . . . . . . . . . . . . . . . . . . . . . . . MEDICATION CHANGES    START  Ceftriaxone 2g through the PICC line every day until 2020    No other changes were made to your medications, please take all your other home medicines as previously prescribed. }  . . . . . . . . . . . . . . . . . . . . . . . . . . . . . . . . . . . . . . . . . . . . . . . . . . . . . . . . . . . . . . . . . . . . . . . [place] FOLLOW-UP CARE RECOMMENDATIONS:    Appointments  Follow-up Information     Follow up With Specialties Details Why Contact Info    [facility]  Please call to make [address] [place] [address]  [phone]    Elmyra Acacia, DO Family Medicine In 6 days Apt with Syed Gunn from Dr. Saba office.  Nurse will call you at 9:30am for virtual [address] [place] [address]  [phone] Follow-up tests needed: Blood tests (CBC, BUN/Cr, liver function tests) to be done at [place] [address] during PICC line dressing changes    Pending test results: At the time of your discharge the following test results are still pending: Second blood culture and IL-6 blood test from 8/17. DIET/what to eat:  Diabetic Diet    ACTIVITY:  Activity as tolerated    Wound care: None    Equipment needed:  None    Specific symptoms to watch for: chest pain, shortness of breath, fever, chills, nausea, vomiting, diarrhea, change in mentation, falling, weakness, bleeding. What to do if new or unexpected symptoms occur? If you experience any of the above symptoms (or should other concerns or questions arise after discharge) please call your primary care physician. Return to the emergency room if you cannot get hold of your doctor. · It is very important that you keep your follow-up appointment(s). · Please bring discharge papers, medication list (and/or medication bottles) to your follow-up appointments for review by your outpatient provider(s). · Please check the list of medications and be sure it includes every medication (even non-prescription medications) that your provider wants you to take. · It is important that you take the medication exactly as they are prescribed. · Keep your medication in the bottles provided by the pharmacist and keep a list of the medication names, dosages, and times to be taken in your wallet. · Do not take other medications without consulting your doctor. · If you have any questions about your medications or other instructions, please talk to your nurse or care provider before you leave the hospital.     Information obtained by:     I understand that if any problems occur once I am at home I am to contact my physician. These instructions were explained to me and I had the opportunity to ask questions.     I understand and acknowledge receipt of the instructions indicated above.                                                                                                                                                Physician's or R.N.'s Signature                                                                  Date/Time                                                                                                                                              Patient or Representative Signature                                                          Date/Time

## 2020-08-18 NOTE — PROGRESS NOTES
PICC Placement Note    PRE-PROCEDURE VERIFICATION  Correct Procedure: yes  Correct Site:  yes  Temperature: Temp: 98.1 °F (36.7 °C), Temperature Source: Temp Source: Oral  Recent Labs     08/18/20  0526   BUN 13   CREA 0.88      WBC 7.7     Allergies: Patient has no known allergies. Education materials for PICC Care given: yes. See Patient Education activity for further details. PICC Booklet placed at bedside: yes    Closed Ended PICC Catheters:  Flush Lumens as Follows:  Intermittent Medication:   Flush before and after each medication with 10 ml NS. Unused Ports:  Flush every 8 hours with 10 ml NS.  TPN Ports:  Flush every 24 hours with 20 ml NS prior to hanging new bag. Blood Draws: Stop infusion, draw off and waste 10 ml of blood. Draw sample with 10cc syringe or greater. DO NOT USE VACUTAINER . Transfer with appropriate device to lab  tubes. Flush with 20 ml NS. Dressing Change:  Every 7 days, and PRN using sterile technique if integrity of dressing is compromised. Initial dressing change for central line 24-48 hours post insertion if gauze is used. Apply new dressing per policy. PROCEDURE DETAIL  Consent was obtained and all questions were answered related to risks and benefits. A single lumen PICC line was inserted, as a sterile procedure using ultrasound and modified Seldinger technique for vascular access and antibiotic therapy. The following documentation is in addition to the PICC properties in the lines/airways flowsheet :  Lot #: [id number]  Lidocaine 1% administered intradermally :yes  Internal Catheter Total Length: 47 (cm) at 3cm out  Vein Selection for PICC:right cephalic  Central Line Bundle followed yes  Complication Related to Insertion:no    The placement was verified by EKG, MAX P WAVE @ 47 (cm). PER EKG PICC TIP @ C/A junction.        Line is okay to use: yes    Juliette Mireles RN

## 2020-08-18 NOTE — ROUTINE PROCESS
Given patients severe diagnosis, a [facility] appt has been scheduled for 8/20/2020.  Office will contact patient prior to arrival.  Jimmy Gramajo, Care Management Specialist.

## 2020-08-18 NOTE — PROGRESS NOTES
Notified MD Allyson Nielsen of intermittent Afib approx 12 sec. Per Family Medicine they will see the pt. Per Dr. Christie Daugherty pt is ok for d/c.

## 2020-08-18 NOTE — PROGRESS NOTES
[facility] Progress Note           Evette Sterling DO    [phone] Office  [phone]  Fax    2020      Assessment & Plan:   · Group B streptococcal bacteremia. Suspect related to left lower extremity cellulitis in the setting of chronic lower extremity lymphedema. Repeat blood cultures remain sterile. Plan discharge on IV ceftriaxone. IV antibiotic orders are in the chart. Patient given prescription order with phone number to contact lymphedema clinic   · RLE DVT diagnosed 2020. Continue Eliquis  · OTTO. Resolved. Subjective:     No complaints. Anxious for discharge    Objective:     Vitals:   Visit Vitals  /75 (BP 1 Location: Right arm, BP Patient Position: Sitting)   Pulse 64   Temp 98.1 °F (36.7 °C)   Resp 18   Ht 6' 2\" (1.88 m)   Wt (!) 381 lb 6.3 oz (173 kg)   SpO2 95%   BMI 48.97 kg/m²        Tmax:  Temp (24hrs), Av.1 °F (36.7 °C), Min:97.9 °F (36.6 °C), Max:98.2 °F (36.8 °C)      Exam:   Patient is intubated:  no    Physical Examination:   General:  Alert, cooperative, no distress   Head:  Normocephalic, atraumatic. Eyes:  Conjunctivae clear   Neck: Supple       Lungs:   No distress. Chest wall:     Heart:     Abdomen:    Obese   Extremities: Moves all. Compression stockings on bilateral lower extremities   Skin:  Patchy erythema adjacent to left knee less intense   Neurologic:  No focal deficits     Labs:        No lab exists for component: ITNL   No results for input(s): CPK, CKMB, TROIQ in the last 72 hours. Recent Labs     20  0526 20  0330 08/15/20  2328    139 133*   K 3.6 3.1* 3.5    107 104   CO2 26 25 21   BUN 13 13 16   CREA 0.88 0.88 0.95   * 106* 99   ALB 3.0* 3.0* 3.2*   WBC 7.7 7.3 13.1*   HGB 12.4 12.3 12.9   HCT 38.8 37.9 39.5    178 189     No results for input(s): INR, PTP, APTT, INREXT in the last 72 hours.   Needs: urine analysis, urine sodium, protein and creatinine  Lab Results   Component Value Date/Time    Creatinine, urine 94.7 08/07/2019 02:41 PM         Cultures:     No results found for: KEVIN  Lab Results   Component Value Date/Time    Culture result: NO GROWTH 2 DAYS 08/15/2020 11:28 PM    Culture result: No growth (<1,000 CFU/ML) 08/15/2020 04:45 AM    Culture result: (A) 08/15/2020 03:44 AM     STREPTOCOCCI, BETA HEMOLYTIC GROUP B GROWING IN 2 OF 4 BOTTLES DRAWN (L AC SITE)    Culture result: (A) 08/15/2020 03:44 AM     PRELIMINARY REPORT OF GRAM POSITIVE COCCI IN CHAINS GROWING IN 2 OF 4 BOTTLES DRAWN CALLED TO AND READ BACK BY MS Favian Krueger RN ON 8/15/20 AT 2338 ([facility] 435).  MS    Culture result: REMAINING BOTTLE(S) HAS/HAVE NO GROWTH SO FAR 08/15/2020 03:44 AM       Radiology:     Medications       Current Facility-Administered Medications   Medication Dose Route Frequency Last Dose    cefTRIAXone (ROCEPHIN) 2 g in 0.9% sodium chloride (MBP/ADV) 50 mL  2 g IntraVENous Q24H      alteplase (CATHFLO) 1 mg in sterile water (preservative free) 1 mL injection  1 mg InterCATHeter PRN      vancomycin (VANCOCIN) 2,500 mg in 0.9% sodium chloride 500 mL IVPB  2,500 mg IntraVENous Q12H 2,500 mg at 08/18/20 0434    sodium chloride (NS) flush 5-10 mL  5-10 mL IntraVENous PRN      sodium chloride (NS) flush 5-40 mL  5-40 mL IntraVENous Q8H 10 mL at 08/18/20 0911    sodium chloride (NS) flush 5-40 mL  5-40 mL IntraVENous PRN 5 mL at 08/15/20 1343    acetaminophen (TYLENOL) tablet 650 mg  650 mg Oral Q6H  mg at 08/15/20 2152    Or    acetaminophen (TYLENOL) suppository 650 mg  650 mg Rectal Q6H PRN      glucose chewable tablet 16 g  4 Tab Oral PRN      dextrose (D50W) injection syrg 12.5-25 g  25-50 mL IntraVENous PRN      glucagon (GLUCAGEN) injection 1 mg  1 mg IntraMUSCular PRN      zinc sulfate (ZINCATE) 220 (50) mg capsule 1 Cap  1 Cap Oral DAILY 1 Cap at 08/18/20 0909    ascorbic acid (vitamin C) (VITAMIN C) tablet 500 mg  500 mg Oral  mg at 08/18/20 7068  atorvastatin (LIPITOR) tablet 20 mg  20 mg Oral QHS 20 mg at 08/17/20 2146    metroNIDAZOLE (FLAGYL) IVPB premix 500 mg  500 mg IntraVENous Q12H 500 mg at 08/18/20 0011    apixaban (ELIQUIS) tablet 5 mg  5 mg Oral BID 5 mg at 08/18/20 8314    metoprolol succinate (TOPROL-XL) XL tablet 50 mg  50 mg Oral DAILY 50 mg at 08/18/20 5290    hydroCHLOROthiazide (HYDRODIURIL) tablet 25 mg  25 mg Oral DAILY 25 mg at 08/18/20 7205    dilTIAZem ER (CARDIZEM CD) capsule 240 mg  240 mg Oral DAILY 240 mg at 08/18/20 3734    insulin lispro (HUMALOG) injection   SubCUTAneous AC&HS 3 Units at 08/18/20 0910    insulin glargine (LANTUS) injection 76 Units  76 Units SubCUTAneous QHS 76 Units at 08/17/20 2146    insulin lispro (HUMALOG) injection 60 Units  60 Units SubCUTAneous TIDAC 60 Units at 08/18/20 0238           Case discussed with: Family practice      Derek Bajwa DO

## 2020-08-18 NOTE — PROGRESS NOTES
1930: Bedside and Verbal shift change report given to Haseeb Burgess RN (oncoming nurse) by Giovanni Pisano RN (offgoing nurse). Report included the following information SBAR, Kardex, MAR, and Accordion. 0730: Bedside and Verbal shift change report given to MADELEINE Lopez (oncoming nurse) by Haseeb Burgess RN (offgoing nurse). Report included the following information SBAR, Kardex, MAR, and Accordion. Problem: Diabetes Self-Management  Goal: *Disease process and treatment process  Description: Define diabetes and identify own type of diabetes; list 3 options for treating diabetes. Outcome: Progressing Towards Goal     Problem: Diabetes Self-Management  Goal: *Incorporating nutritional management into lifestyle  Description: Describe effect of type, amount and timing of food on blood glucose; list 3 methods for planning meals. Outcome: Progressing Towards Goal     Problem: Diabetes Self-Management  Goal: *Incorporating physical activity into lifestyle  Description: State effect of exercise on blood glucose levels. Outcome: Progressing Towards Goal     Problem: Diabetes Self-Management  Goal: *Developing strategies to promote health/change behavior  Description: Define the ABC's of diabetes; identify appropriate screenings, schedule and personal plan for screenings.   Outcome: Progressing Towards Goal

## 2020-08-19 ENCOUNTER — PATIENT OUTREACH (OUTPATIENT)
Dept: CASE MANAGEMENT | Age: 60
End: 2020-08-19

## 2020-08-19 LAB — IL6 SERPL-MCNC: 28.5 PG/ML (ref 0–15.5)

## 2020-08-19 NOTE — PROGRESS NOTES
Patient contacted regarding recent discharge from [facility] dx severe Sepsis and COVID-19 risk. Discussed COVID-19 related testing which was available at this time. Test results were negative. Patient informed of results, if available? yes    Care Transition Nurse/ Ambulatory Care Manager/ LPN Care Coordinator contacted the patient by telephone to perform post discharge assessment. Verified name and  with patient as identifiers. Patient has following risk factors of: diabetes. CTN/ACM/LPN reviewed discharge instructions, medical action plan and red flags related to discharge diagnosis. Reviewed and educated them on any new and changed medications related to discharge diagnosis. Advised obtaining a 90-day supply of all daily and as-needed medications. Advance Care Planning:   Does patient have an Advance Directive: not on file     Education provided regarding infection prevention, and signs and symptoms of COVID-19 and when to seek medical attention with patient who verbalized understanding. Discussed exposure protocols and quarantine from [address] you at higher risk for severe illness  and given an opportunity for questions and concerns. The patient agrees to contact the COVID-19 hotline [phone] or PCP office for questions related to their healthcare. CTN/ACM/LPN provided contact information for future reference. From CDC: Are you at higher risk for severe illness?  Wash your hands often.  Avoid close contact (6 feet, which is about two arm lengths) with people who are sick.  Put distance between yourself and other people if COVID-19 is spreading in your community.  Clean and disinfect frequently touched surfaces.  Avoid all cruise travel and non-essential air travel.  Call your healthcare professional if you have concerns about COVID-19 and your underlying condition or if you are sick.     For more information on steps you can take to protect yourself, see CDC's How to Protect Yourself      Patient/family/caregiver given information for [employer] and agrees to enroll no  Patient has follow up appointment with [facility] urgent care 8/20/2020   Will follow up with PCP and will schedule follow up with the Lymphedema Clinic. Plan for follow-up call in 7-14 days based on severity of symptoms and risk factors.

## 2020-08-21 ENCOUNTER — TELEPHONE (OUTPATIENT)
Dept: FAMILY MEDICINE CLINIC | Age: 60
End: 2020-08-21

## 2020-08-21 LAB
BACTERIA SPEC CULT: NORMAL
SERVICE CMNT-IMP: NORMAL

## 2020-08-21 NOTE — TELEPHONE ENCOUNTER
----- Message from Simon Pryor sent at 8/21/2020  2:33 PM EDT -----  Regarding: Dr. Deedee Noble Message/Vendor Calls    Caller's first and last name:Rogerio Campa      Reason for call:doctor call back      Callback required yes/no and why:yes      Best contact number(s):[phone]      Details to clarify the request:Pt called to let the doctor he can not get an appt at the Lymphedema Clinic until 11/23/20. Pt requested a call to advise what he should do.       Simon Pryor

## 2020-08-22 LAB
BACTERIA SPEC CULT: ABNORMAL
SERVICE CMNT-IMP: ABNORMAL

## 2020-08-24 ENCOUNTER — VIRTUAL VISIT (OUTPATIENT)
Dept: FAMILY MEDICINE CLINIC | Age: 60
End: 2020-08-24
Payer: COMMERCIAL

## 2020-08-24 DIAGNOSIS — A41.9 SEVERE SEPSIS (HCC): Primary | ICD-10-CM

## 2020-08-24 DIAGNOSIS — E11.9 TYPE 2 DIABETES MELLITUS WITHOUT COMPLICATION, WITH LONG-TERM CURRENT USE OF INSULIN (HCC): ICD-10-CM

## 2020-08-24 DIAGNOSIS — R65.20 SEVERE SEPSIS (HCC): Primary | ICD-10-CM

## 2020-08-24 DIAGNOSIS — Z79.4 TYPE 2 DIABETES MELLITUS WITHOUT COMPLICATION, WITH LONG-TERM CURRENT USE OF INSULIN (HCC): ICD-10-CM

## 2020-08-24 PROCEDURE — 99495 TRANSJ CARE MGMT MOD F2F 14D: CPT | Performed by: NURSE PRACTITIONER

## 2020-08-24 NOTE — PROGRESS NOTES
Tami Gamino is a 61 y.o. male who was seen by synchronous (real-time) audio-video technology on 8/24/2020 for No chief complaint on file. I spent at least 15 minutes on this visit with this established patient. 712  Subjective:     FREDA visit: [facility] admitted to [facility] for severe [facility], the running theory is due to cellulitis but source is still unclear  Followed by Dr. Alysia Colon (ID)  Still getting IV Vanc via [facility] line  Labs were drawn today and dressing changed  Last tx is Friday, no planned inperson f/u yet  Feeling good  Sugar has calmed down since infection has improved  No complaints at this time  Denies any fevers  COVID was negative    Prior to Admission medications    Medication Sig Start Date End Date Taking? Authorizing Provider   metoprolol succinate (TOPROL-XL) 50 mg XL tablet Take 50 mg by mouth daily. Provider, Historical   insulin lispro (HUMALOG) 100 unit/mL kwikpen by SubCUTAneous route Before breakfast, lunch, and dinner. Sliding scale    Provider, Historical   apixaban (ELIQUIS) 5 mg tablet Take 1 Tab by mouth two (2) times a day. 6/29/20   Wandy Mir NP   dilTIAZem ER (CARDIZEM LA) 240 mg Tb24 tablet Take 240 mg by mouth daily. Provider, Historical   hydroCHLOROthiazide (HYDRODIURIL) 25 mg tablet Take 25 mg by mouth daily. Provider, Historical   rosuvastatin (CRESTOR) 20 mg tablet Take 20 mg by mouth nightly. Provider, Historical   aspirin delayed-release 81 mg tablet Take 81 mg by mouth daily. Provider, Historical   insulin glargine (LANTUS,BASAGLAR) 100 unit/mL (3 mL) inpn 76 Units by SubCUTAneous route daily. 8/6/19   Shamir Lockett, DO   metFORMIN (GLUCOPHAGE) 1,000 mg tablet Take 1,000 mg by mouth two (2) times daily (with meals).     Provider, Historical     Patient Active Problem List    Diagnosis Date Noted    Severe sepsis ([address] 08/15/2020    SIRS (systemic inflammatory response syndrome) ([facility] [address] 08/15/2020    Lactic acidosis 08/15/2020    AR (allergic rhinitis) 06/11/2009    Hyperlipemia 03/12/2009    HTN (hypertension) 03/12/2009    DM (diabetes mellitus) ([facility] [address] 03/12/1999       ROS  A comprehensive review of system was obtained and negative except findings in the HPI    Objective:   No flowsheet data found. General: alert, cooperative, no distress   Mental  status: normal mood, behavior, speech, dress, motor activity, and thought processes, able to follow commands   HENT: NCAT   Neck: no visualized mass   Resp: no respiratory distress   Neuro: no gross deficits   Skin: no discoloration or lesions of concern on visible areas   Psychiatric: normal affect, consistent with stated mood, no evidence of hallucinations     Additional exam findings: none    Diagnoses and all orders for this visit:    1. Severe sepsis ([facility] [address]    2. Type 2 diabetes mellitus without complication, with long-term current use of insulin (HCC)      Cont plan with Vanc via PIC  Follow up with Dr Jess Ochoa as planned  No changes at this time      We discussed the expected course, resolution and complications of the diagnosis(es) in detail. Medication risks, benefits, costs, interactions, and alternatives were discussed as indicated. I advised him to contact the office if his condition worsens, changes or fails to improve as anticipated. He expressed understanding with the diagnosis(es) and plan. Radha Harley, who was evaluated through a patient-initiated, synchronous (real-time) audio-video encounter, and/or his healthcare decision maker, is aware that it is a billable service, with coverage as determined by his insurance carrier. He provided verbal consent to proceed: Yes, and patient identification was verified. It was conducted pursuant to the emergency declaration under the [address] [place] [address], [address] [place] [address] authority and the [employer] Act. A caregiver was present when appropriate.  Ability to conduct physical exam was limited. I was in the office. The patient was at home.       Donna Ramirez NP

## 2020-08-25 NOTE — TELEPHONE ENCOUNTER
Pt called again requesting call back from Dr. Cherelle Villaseñor regarding referral to Lymphedema clinic stating first available appointment is not until 11/23/20 and pt does not think he should wait this long to be seen. Please call pt at [phone].  Ldm

## 2020-09-02 ENCOUNTER — PATIENT OUTREACH (OUTPATIENT)
Dept: CASE MANAGEMENT | Age: 60
End: 2020-09-02

## 2020-09-02 NOTE — PROGRESS NOTES
Patient resolved from Transition of Care episode on/9/2/2020  Discussed COVID-19 related testing which was available at this time. Test results were negative. Patient informed of results, if available? yes     Patient/family has been provided the following resources and education related to COVID-19:                         Signs, symptoms and red flags related to COVID-19            CDC exposure and quarantine guidelines            [employer] exposure contact - [phone]            Contact for their local Department of Health                 Patient currently reports that the following symptoms have improved:  no new symptoms. No further outreach scheduled with this CTN/ACM/LPN/HC/ MA. Episode of Care resolved. Patient has this CTN/ACM/LPN/HC/MA contact information if future needs arise.

## 2020-10-14 ENCOUNTER — TELEPHONE (OUTPATIENT)
Dept: FAMILY MEDICINE CLINIC | Age: 60
End: 2020-10-14

## 2020-10-14 ENCOUNTER — HOSPITAL ENCOUNTER (OUTPATIENT)
Dept: VASCULAR SURGERY | Age: 60
Discharge: HOME | End: 2020-10-14
Attending: FAMILY MEDICINE
Payer: COMMERCIAL

## 2020-10-14 DIAGNOSIS — I82.411 ACUTE DEEP VEIN THROMBOSIS (DVT) OF FEMORAL VEIN OF RIGHT LOWER EXTREMITY (HCC): ICD-10-CM

## 2020-10-14 DIAGNOSIS — Z12.11 COLON CANCER SCREENING: Primary | ICD-10-CM

## 2020-10-14 PROCEDURE — 93971 EXTREMITY STUDY: CPT

## 2020-10-14 NOTE — TELEPHONE ENCOUNTER
Patient called and would like an order put in for a Colonoscopy. He would like to have the order put in and a call back regarding who he can go to get it done.  Please call him back at [phone]

## 2020-11-12 ENCOUNTER — HOSPITAL ENCOUNTER (OUTPATIENT)
Dept: PHYSICAL THERAPY | Age: 60
Discharge: HOME | End: 2020-11-12
Payer: COMMERCIAL

## 2020-11-12 PROCEDURE — 97530 THERAPEUTIC ACTIVITIES: CPT

## 2020-11-12 PROCEDURE — 97162 PT EVAL MOD COMPLEX 30 MIN: CPT

## 2020-11-12 PROCEDURE — 97760 ORTHOTIC MGMT&TRAING 1ST ENC: CPT
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
## 2020-11-16 ENCOUNTER — DOCUMENTATION ONLY (OUTPATIENT)
Dept: FAMILY MEDICINE CLINIC | Age: 60
End: 2020-11-16

## 2020-11-18 ENCOUNTER — DOCUMENTATION ONLY (OUTPATIENT)
Dept: FAMILY MEDICINE CLINIC | Age: 60
End: 2020-11-18

## 2020-11-23 ENCOUNTER — APPOINTMENT (OUTPATIENT)
Dept: PHYSICAL THERAPY | Age: 60
End: 2020-11-23

## 2020-12-17 DIAGNOSIS — I82.411 ACUTE DEEP VEIN THROMBOSIS (DVT) OF FEMORAL VEIN OF RIGHT LOWER EXTREMITY (HCC): Primary | ICD-10-CM

## 2020-12-17 RX ORDER — APIXABAN 5 MG/1
TABLET, FILM COATED ORAL
Qty: 60 TAB | Refills: 5 | Status: SHIPPED | OUTPATIENT
Start: 2020-12-17 | End: 2021-02-19 | Stop reason: SDUPTHER

## 2020-12-21 ENCOUNTER — TELEPHONE (OUTPATIENT)
Dept: FAMILY MEDICINE CLINIC | Age: 60
End: 2020-12-21

## 2020-12-21 NOTE — TELEPHONE ENCOUNTER
----- Message from Clarita May sent at 12/21/2020 12:40 PM EST -----  Regarding: Visit Follow-Up Question  Contact: [phone]  Hi   I made a appointment for January 15 to have my DVT scanned on my leg again and the doctor would like to see me after he gets the results back to discuss - need to setup a appointment with him.   Norma Fajardo

## 2020-12-21 NOTE — TELEPHONE ENCOUNTER
This patient just needs appt to see Dr. Lit Kelly for a follow up visit. Look for 3rd week of January.  Nimisha Felix

## 2020-12-21 NOTE — TELEPHONE ENCOUNTER
Attempted to aria lpt, no answer left vm to call back and schedule apt with dr Bruna Crooks.  Please schedule pt apt

## 2021-01-15 ENCOUNTER — HOSPITAL ENCOUNTER (OUTPATIENT)
Dept: VASCULAR SURGERY | Age: 61
Discharge: HOME | End: 2021-01-15
Attending: FAMILY MEDICINE
Payer: COMMERCIAL

## 2021-01-15 ENCOUNTER — TRANSCRIBE ORDER (OUTPATIENT)
Dept: REGISTRATION | Age: 61
End: 2021-01-15

## 2021-01-15 DIAGNOSIS — I82.419 DVT FEMORAL (DEEP VENOUS THROMBOSIS) (HCC): ICD-10-CM

## 2021-01-15 DIAGNOSIS — I82.419 DVT FEMORAL (DEEP VENOUS THROMBOSIS) (HCC): Primary | ICD-10-CM

## 2021-01-15 PROCEDURE — 93971 EXTREMITY STUDY: CPT

## 2021-01-17 DIAGNOSIS — I82.411 ACUTE DEEP VEIN THROMBOSIS (DVT) OF FEMORAL VEIN OF RIGHT LOWER EXTREMITY (HCC): ICD-10-CM

## 2021-01-22 ENCOUNTER — OFFICE VISIT (OUTPATIENT)
Dept: FAMILY MEDICINE CLINIC | Age: 61
End: 2021-01-22
Payer: COMMERCIAL

## 2021-01-22 VITALS
HEART RATE: 73 BPM | BODY MASS INDEX: 40.43 KG/M2 | OXYGEN SATURATION: 96 % | WEIGHT: 315 LBS | HEIGHT: 74 IN | SYSTOLIC BLOOD PRESSURE: 159 MMHG | RESPIRATION RATE: 18 BRPM | DIASTOLIC BLOOD PRESSURE: 75 MMHG | TEMPERATURE: 97.9 F

## 2021-01-22 DIAGNOSIS — I82.511 CHRONIC DEEP VEIN THROMBOSIS (DVT) OF FEMORAL VEIN OF RIGHT LOWER EXTREMITY (HCC): Primary | ICD-10-CM

## 2021-01-22 PROCEDURE — 99214 OFFICE O/P EST MOD 30 MIN: CPT | Performed by: FAMILY MEDICINE

## 2021-01-22 NOTE — PATIENT INSTRUCTIONS
A Healthy Lifestyle: Care Instructions Your Care Instructions A healthy lifestyle can help you feel good, stay at a healthy weight, and have plenty of energy for both work and play. A healthy lifestyle is something you can share with your whole family. A healthy lifestyle also can lower your risk for serious health problems, such as high blood pressure, heart disease, and diabetes. You can follow a few steps listed below to improve your health and the health of your family. Follow-up care is a key part of your treatment and safety. Be sure to make and go to all appointments, and call your doctor if you are having problems. It's also a good idea to know your test results and keep a list of the medicines you take. How can you care for yourself at home? · Do not eat too much sugar, fat, or fast foods. You can still have dessert and treats now and then. The goal is moderation. · Start small to improve your eating habits. Pay attention to portion sizes, drink less juice and soda pop, and eat more fruits and vegetables. ? Eat a healthy amount of food. A 3-ounce serving of meat, for example, is about the size of a deck of cards. Fill the rest of your plate with vegetables and whole grains. ? Limit the amount of soda and sports drinks you have every day. Drink more water when you are thirsty. ? Eat at least 5 servings of fruits and vegetables every day. It may seem like a lot, but it is not hard to reach this goal. A serving or helping is 1 piece of fruit, 1 cup of vegetables, or 2 cups of leafy, raw vegetables. Have an apple or some carrot sticks as an afternoon snack instead of a candy bar.  Try to have fruits and/or vegetables at every meal. 
 · Make exercise part of your daily routine. You may want to start with simple activities, such as walking, bicycling, or slow swimming. Try to be active 30 to 60 minutes every day. You do not need to do all 30 to 60 minutes all at once. For example, you can exercise 3 times a day for 10 or 20 minutes. Moderate exercise is safe for most people, but it is always a good idea to talk to your doctor before starting an exercise program. 
· Keep moving. [place]y [place] the lawn, work in the garden, or [employer]. Take the stairs instead of the elevator at work. · If you smoke, quit. People who smoke have an increased risk for heart attack, stroke, cancer, and other lung illnesses. Quitting is hard, but there are ways to boost your chance of quitting tobacco for good. ? Use nicotine gum, patches, or lozenges. ? Ask your doctor about stop-smoking programs and medicines. ? Keep trying. In addition to reducing your risk of diseases in the future, you will notice some benefits soon after you stop using tobacco. If you have shortness of breath or asthma symptoms, they will likely get better within a few weeks after you quit. · Limit how much alcohol you drink. Moderate amounts of alcohol (up to 2 drinks a day for men, 1 drink a day for women) are okay. But drinking too much can lead to liver problems, high blood pressure, and other health problems. Family health If you have a family, there are many things you can do together to improve your health. · Eat meals together as a family as often as possible. · Eat healthy foods. This includes fruits, vegetables, lean meats and dairy, and whole grains. · Include your family in your fitness plan. Most people think of activities such as jogging or tennis as the way to fitness, but there are many ways you and your family can be more active. Anything that makes you breathe hard and gets your heart pumping is exercise. Here are some tips: ? Walk to do errands or to take your child to school or the bus. 
? Go for a family bike ride after dinner instead of watching TV. Where can you learn more? Go to http://www.gray.com/ Enter V529 in the search box to learn more about \"A Healthy Lifestyle: Care Instructions. \" Current as of: January 31, 2020               Content Version: 12.6 © 2006-2020 [employer]. Care instructions adapted under license by [employer] (which disclaims liability or warranty for this information). If you have questions about a medical condition or this instruction, always ask your healthcare professional. [address] any warranty or liability for your use of this information.

## 2021-01-22 NOTE — PROGRESS NOTES
Progress Note    he is a 61y.o. year old male who presents for evalution. Subjective:     Pt here for follow up from his femoral DVT, this seems to be more unprovoked. Recent doppler showed a chronic DVT in R femoral.  Pt reports no pain but does have swelling of the RLE in calf region. Pt is tolerating the eliquis thus far except believes it may be causing some diarrhea. No bleeding episodes. DVT seems to have been unprovoked tho pt does have a sedentary job and obesity. He has no fam hx of blood clots and no prior blood clot. I discussed his chronic thrombus at length with him. Reviewed PmHx, RxHx, FmHx, SocHx, AllgHx and updated and dated in the chart. Review of Systems - negative except as listed above in the HPI    Objective:     Vitals:    01/22/21 0838   BP: (!) 159/75   Pulse: 73   Resp: 18   Temp: 97.9 °F (36.6 °C)   TempSrc: Oral   SpO2: 96%   Weight: (!) 399 lb (181 kg)   Height: 6' 2\" (1.88 m)       Current Outpatient Medications   Medication Sig    Eliquis 5 mg tablet TAKE 1 TABLET BY MOUTH TWICE A DAY    metoprolol succinate (TOPROL-XL) 50 mg XL tablet Take 50 mg by mouth daily.  insulin lispro (HUMALOG) 100 unit/mL kwikpen by SubCUTAneous route Before breakfast, lunch, and dinner. Sliding scale    dilTIAZem ER (CARDIZEM LA) 240 mg Tb24 tablet Take 240 mg by mouth daily.  hydroCHLOROthiazide (HYDRODIURIL) 25 mg tablet Take 25 mg by mouth daily.  rosuvastatin (CRESTOR) 20 mg tablet Take 20 mg by mouth nightly.  insulin glargine (LANTUS,BASAGLAR) 100 unit/mL (3 mL) inpn 76 Units by SubCUTAneous route daily.  metFORMIN (GLUCOPHAGE) 1,000 mg tablet Take 1,000 mg by mouth two (2) times daily (with meals).  aspirin delayed-release 81 mg tablet Take 81 mg by mouth daily. No current facility-administered medications for this visit.         Physical Examination: General appearance - alert, well appearing, and in no distress  Mental status - alert, oriented to person, place, and time      Assessment/ Plan:   Diagnoses and all orders for this visit:    1. Chronic deep vein thrombosis (DVT) of femoral vein of right lower extremity (HCC)  -     REFERRAL TO HEMATOLOGY ONCOLOGY    Discussed with patient long-term anticoagulation versus discontinuation and the risk per year of DVT recurrence given unprovoked proximal VTE. Would appreciate hematology input as well. Patient is using compression stockings currently  Follow-up and Dispositions    · Return if symptoms worsen or fail to improve. I have discussed the diagnosis with the patient and the intended plan as seen in the above orders. The patient has received an after-visit summary and questions were answered concerning future plans. Pt conveyed understanding of plan. Medication Side Effects and Warnings were discussed with patient    An electronic signature was used to authenticate this note  Xin Brown DO  On this date 01/22/21 I have spent 30 minutes reviewing previous notes, test results and face to face with the patient discussing the diagnosis and importance of compliance with the treatment plan as well as documenting on the day of the visit.

## 2021-01-22 NOTE — PROGRESS NOTES
Chief Complaint   Patient presents with    Follow-up     Patient presents in office today for f/u from duplex that he had done last week. No other concerns. 1. Have you been to the ER, urgent care clinic since your last visit? Hospitalized since your last visit? No    2. Have you seen or consulted any other health care providers outside of the [address] [place] [address] since your last visit? Include any pap smears or colon screening.  No    Learning Assessment 8/6/2019   PRIMARY LEARNER Patient   PRIMARY LANGUAGE ENGLISH   LEARNER PREFERENCE PRIMARY LISTENING   ANSWERED BY self   RELATIONSHIP SELF

## 2021-03-02 NOTE — PROGRESS NOTES
[id number] [facility] Oncology at [facility]  [phone]    Hematology / Oncology Consult    Reason for Visit:   Casandra Kruger is a 61 y.o. male who is seen in consultation at the request of Dr. Pennie Donis for evaluation of DVT. History of Present Illness:   Casandra Kruger is a 61 y.o. male with DM, HTN, comes in for evaluation of R leg DVT. Pt was diagnosed with a R femoral DVT in 2020. No preceding surgeries, fractures, long trips, use of testosterone, hospitalizations. No family h/o DVT. Of note, he has chronic lymphedema in bilateral legs for the past several years of unknown etiology. Had seen lymphedema clinic last year, was advised on therapy for 6 weeks which would involved wraps. But pt declined this option since he was told he would be unable to wear shoes, which would limit his ability to go to work. 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 ) He states the R leg remains larger than the left ever since DVT found. He takes Eliquis and feels that it causes diarrhea. When he ran out of Eliquis for a short time, he states his diarrhea improved, then recurred when he restarted Eliquis.      Past Medical History:   Diagnosis Date    AR (allergic rhinitis) 2009    Diabetes ([facility] [address]     Dr. Lilia Wakefield    History of vascular access device 2020    4f single picc placed on r cephalic, 32SS at 3cm out placed by Serjio Shook, difficult limited access    Hypercholesterolemia     Hypertension       Past Surgical History:   Procedure Laterality Date    HX HEART CATHETERIZATION      normal      Social History     Tobacco Use    Smoking status: Former Smoker     Packs/day: 1.00     Years: 15.00     Pack years: 15.00     Types: Cigarettes, Pipe, Cigars     Quit date: 3/12/1991     Years since quittin.9    Smokeless tobacco: Never Used   Substance Use Topics    Alcohol use: No      Family History   Problem Relation Age of Onset    Heart Failure Father     Diabetes Brother     Heart Failure Paternal Grandfather          age 37 AMI     Current Outpatient Medications   Medication Sig    apixaban (Eliquis) 5 mg tablet TAKE 1 TABLET BY MOUTH TWICE A DAY PO    metoprolol succinate (TOPROL-XL) 50 mg XL tablet Take 50 mg by mouth daily.  insulin lispro (HUMALOG) 100 unit/mL kwikpen by SubCUTAneous route Before breakfast, lunch, and dinner. Sliding scale    dilTIAZem ER (CARDIZEM LA) 240 mg Tb24 tablet Take 240 mg by mouth daily.  hydroCHLOROthiazide (HYDRODIURIL) 25 mg tablet Take 25 mg by mouth daily.  rosuvastatin (CRESTOR) 20 mg tablet Take 20 mg by mouth nightly.  aspirin delayed-release 81 mg tablet Take 81 mg by mouth daily.  insulin glargine (LANTUS,BASAGLAR) 100 unit/mL (3 mL) inpn 76 Units by SubCUTAneous route daily.  metFORMIN (GLUCOPHAGE) 1,000 mg tablet Take 1,000 mg by mouth two (2) times daily (with meals). No current facility-administered medications for this visit. No Known Allergies     Review of Systems: A complete review of systems was obtained, negative except as described above. Physical Exam:     Visit Vitals  BP (!) 154/79   Pulse 82   Temp 97.5 °F (36.4 °C)   Resp 18   Ht 6' 2\" (1.88 m)   Wt (!) 400 lb 6.4 oz (181.6 kg)   SpO2 93%   BMI 51.41 kg/m²     ECOG PS: 0  General: Well developed, no acute distress  Eyes: PERRLA, EOMI, anicteric sclerae  HENT: Atraumatic, OP clear, TMs intact without erythema  Neck: Supple  Lymphatic: No cervical, supraclavicular, axillary or inguinal adenopathy  Respiratory: CTAB, normal respiratory effort  CV: Normal rate, regular rhythm, no murmurs  GI: Soft, nontender, nondistended, no masses. Could not assess HSM due to body habitus. MS: Normal gait and station. Digits without clubbing or cyanosis. Skin: No rashes, ecchymoses, or petechiae. Normal temperature, turgor, and texture. Neuro/Psych: Alert, oriented. 5/5 strength in all 4 extremities.  Appropriate affect, normal judgment/insight. Results:     Lab Results   Component Value Date/Time    WBC 7.7 2020 05:26 AM    HGB 12.4 2020 05:26 AM    HCT 38.8 2020 05:26 AM    PLATELET 025  05:26 AM    MCV 86.4 2020 05:26 AM    ABS. NEUTROPHILS 4.8 2020 05:26 AM     Lab Results   Component Value Date/Time    Sodium 138 2020 05:26 AM    Potassium 3.6 2020 05:26 AM    Chloride 105 2020 05:26 AM    CO2 26 2020 05:26 AM    Glucose 172 (H) 2020 05:26 AM    BUN 13 2020 05:26 AM    Creatinine 0.88 2020 05:26 AM    GFR est AA >60 2020 05:26 AM    GFR est non-AA >60 2020 05:26 AM    Calcium 8.2 (L) 2020 05:26 AM    Glucose (POC) 184 (H) 2020 12:14 PM     Lab Results   Component Value Date/Time    Bilirubin, total 1.1 (H) 2020 05:26 AM    ALT (SGPT) 67 2020 05:26 AM    Alk. phosphatase 60 2020 05:26 AM    Protein, total 6.9 2020 05:26 AM    Albumin 3.0 (L) 2020 05:26 AM    Globulin 3.9 2020 05:26 AM     Lab Results   Component Value Date/Time    Ferritin 190 2020 03:30 AM       No results found for: B12LT, FOL, RBCF  Lab Results   Component Value Date/Time    TSH 0.96 10/18/2010 10:22 AM     No results found for: HAMAT, HAAB, HABT, HAAT, HBSAG, HBSB, HBSAT, HBABN, HBCM, HBCAB, HBCAT, XBCABS, [address], [address], [address] [facility], [id number], [address], [facility], HBCLT, [address] [facility], [id number], [id number], [address] [facility], [id number], HBCMLT, [id number], HCGAT      Imagin/15/21 RLE Doppler:  Right lower extremity venous duplex positive for chronic deep vein thrombosis in the popliteal vein. Left common femoral vein is thrombus free. No significant changes from previous study. 10/14/20 RLE Doppler:  Right lower extremity venous duplex positive for deep venous thrombosis of indeterminate age involving the popliteal vein. Left common femoral vein is thrombus free.     5/3/20 RLE Doppler:  Right lower extremity venous duplex positive for acute deep venous thrombosis involving the proximal femoral vein extending distal into the popliteal vein. Left common femoral vein is thrombus free. Assessment & Plan:   Hawk Garza is a 61 y.o. male is seen for DVT. 1. Chronic DVT:   Original DVT extended from R proximal femoral vein to popliteal vein. This was thought to be unprovoked. Pt has had some improvement with anticoagulation, with now doppler showing chronic DVT in popliteal vein, no longer present in femoral veing. I discussed that unprovoked DVT/PE are treated with anticoagulation indefinitely given lack of removable risk factors. Pt's obesity and chronic lymphedema both pose risk of recurrence. -- I recommend continuing on anticoagulation indefinitely. -- No need for hypercoag workup as it would not . -- It is reasonable to try Xarelto to see if he tolerates this one better. I sent in a month supply and asked pt to update Dr. Nuzhat Arango on which medicine is tolerated better. 2. Chronic BLE lymphedema:   Unclear etiology. Was seen by lymphedema clinic, but pt did not want to proceed with their treatment due to his work schedule. 3. Obesity: This does pose risk for thrombosis as well. Data do not provide much information on use of DOACs in those with BMI > 50. People with BMI of 40 were studied and thought to tolerate DOACs well without need for dose adjustment. If pt has a future extension of clot or new clot, would consider switching to therapeutic Lovenox or Warfarin. 4. HTN / DM: On medications prescribed by PCP. I appreciate the opportunity to participate in Mr. Rena Galvan alyx.     Signed By: Janet Root MD     March 4, 2021

## 2021-03-04 ENCOUNTER — OFFICE VISIT (OUTPATIENT)
Dept: ONCOLOGY | Age: 61
End: 2021-03-04
Payer: COMMERCIAL

## 2021-03-04 VITALS
OXYGEN SATURATION: 93 % | WEIGHT: 315 LBS | RESPIRATION RATE: 18 BRPM | DIASTOLIC BLOOD PRESSURE: 79 MMHG | BODY MASS INDEX: 40.43 KG/M2 | HEART RATE: 82 BPM | TEMPERATURE: 97.5 F | SYSTOLIC BLOOD PRESSURE: 154 MMHG | HEIGHT: 74 IN

## 2021-03-04 DIAGNOSIS — I82.561 CHRONIC DEEP VEIN THROMBOSIS (DVT) OF CALF MUSCLE VEIN OF RIGHT LOWER EXTREMITY (HCC): Primary | ICD-10-CM

## 2021-03-04 DIAGNOSIS — I89.0 LYMPHEDEMA: ICD-10-CM

## 2021-03-04 DIAGNOSIS — E66.01 CLASS 3 SEVERE OBESITY DUE TO EXCESS CALORIES WITH SERIOUS COMORBIDITY AND BODY MASS INDEX (BMI) OF 50.0 TO 59.9 IN ADULT (HCC): ICD-10-CM

## 2021-03-04 PROCEDURE — 99244 OFF/OP CNSLTJ NEW/EST MOD 40: CPT | Performed by: INTERNAL MEDICINE

## 2021-03-04 RX ORDER — ATORVASTATIN CALCIUM 40 MG/1
TABLET, FILM COATED ORAL
COMMUNITY
Start: 2021-02-15 | End: 2021-12-05

## 2021-03-04 NOTE — PROGRESS NOTES
Chief Complaint   Patient presents with    New Patient     Ulis Cowden is a pleasant 61year old male who presents as a new patient for DVT.  He denies pain today

## 2021-08-06 ENCOUNTER — OFFICE VISIT (OUTPATIENT)
Dept: FAMILY MEDICINE CLINIC | Age: 61
End: 2021-08-06

## 2021-08-06 VITALS
RESPIRATION RATE: 16 BRPM | HEIGHT: 74 IN | DIASTOLIC BLOOD PRESSURE: 86 MMHG | BODY MASS INDEX: 40.43 KG/M2 | WEIGHT: 315 LBS | HEART RATE: 67 BPM | TEMPERATURE: 98.5 F | SYSTOLIC BLOOD PRESSURE: 143 MMHG | OXYGEN SATURATION: 94 %

## 2021-08-06 DIAGNOSIS — L23.7 POISON IVY DERMATITIS: Primary | ICD-10-CM

## 2021-08-06 PROCEDURE — 99213 OFFICE O/P EST LOW 20 MIN: CPT | Performed by: FAMILY MEDICINE

## 2021-08-06 PROCEDURE — 96372 THER/PROPH/DIAG INJ SC/IM: CPT | Performed by: FAMILY MEDICINE

## 2021-08-06 RX ORDER — TRIAMCINOLONE ACETONIDE 1 MG/G
CREAM TOPICAL
Qty: 45 G | Refills: 1 | Status: SHIPPED | OUTPATIENT
Start: 2021-08-06 | End: 2021-12-21

## 2021-08-06 NOTE — PROGRESS NOTES
Chief Complaint   Patient presents with    Poison Ivy/Poison Oak/Poison Sumac Exposure     Patient presents in office today with c/o poison ivy. Has been treating with calamine and hydrocortisone cream which is taking away the itch but it is continuing to spread. No other concerns. 1. Have you been to the ER, urgent care clinic since your last visit? Hospitalized since your last visit? No    2. Have you seen or consulted any other health care providers outside of the [address] [place] [address] since your last visit? Include any pap smears or colon screening.  No    Learning Assessment 8/6/2019   PRIMARY LEARNER Patient   PRIMARY LANGUAGE ENGLISH   LEARNER PREFERENCE PRIMARY LISTENING   ANSWERED BY self   RELATIONSHIP SELF

## 2021-08-06 NOTE — PROGRESS NOTES
Progress Note    he is a 61y.o. year old male who presents for evalution. Subjective:     pt here for poison ivy dermatitis, was chopping up a tree and then got it. On b/l arms, R hip and in genital region. Afraid it will spread to face etc.  He is diabetic on 100u lantus nightly and SS humalog 60-90u after meals. He has freestlye [place] and can check his glucose as needed. Reviewed PmHx, RxHx, FmHx, SocHx, AllgHx and updated and dated in the chart. Review of Systems - negative except as listed above in the HPI    Objective:     Vitals:    08/06/21 0919   BP: (!) 143/86   Pulse: 67   Resp: 16   Temp: 98.5 °F (36.9 °C)   TempSrc: Oral   SpO2: 94%   Weight: (!) 390 lb (176.9 kg)   Height: 6' 2\" (1.88 m)       Current Outpatient Medications   Medication Sig    methylPREDNISolone, PF, (Solu-MEDROL) 125 mg/2 mL solr 2 mL by IntraVENous route once for 1 dose.  triamcinolone acetonide (KENALOG) 0.1 % topical cream Apply  to affected area three (3) times daily as needed for Skin Irritation. use thin layer    atorvastatin (LIPITOR) 40 mg tablet     metoprolol succinate (TOPROL-XL) 50 mg XL tablet Take 50 mg by mouth daily.  insulin lispro (HUMALOG) 100 unit/mL kwikpen by SubCUTAneous route Before breakfast, lunch, and dinner. Sliding scale    dilTIAZem ER (CARDIZEM LA) 240 mg Tb24 tablet Take 240 mg by mouth daily.  hydroCHLOROthiazide (HYDRODIURIL) 25 mg tablet Take 25 mg by mouth daily.  insulin glargine (LANTUS,BASAGLAR) 100 unit/mL (3 mL) inpn 100 Units by SubCUTAneous route daily.  metFORMIN (GLUCOPHAGE) 1,000 mg tablet Take 1,000 mg by mouth two (2) times daily (with meals).  rivaroxaban (Xarelto) 20 mg tab tablet Take 1 Tab by mouth daily. Indications: blood clot in a deep vein of the extremities (Patient not taking: Reported on 8/6/2021)    rosuvastatin (CRESTOR) 20 mg tablet Take 20 mg by mouth nightly.  (Patient not taking: Reported on 8/6/2021)    aspirin delayed-release 81 mg tablet Take 81 mg by mouth daily. (Patient not taking: Reported on 8/6/2021)     No current facility-administered medications for this visit. Physical Examination: General appearance - alert, well appearing, and in no distress  Skin -Poison ivy dermatitis bilateral arms right hip region and groin region    Assessment/ Plan:   Diagnoses and all orders for this visit:    1. Poison ivy dermatitis  -     methylPREDNISolone, PF, (Solu-MEDROL) 125 mg/2 mL solr; 2 mL by IntraVENous route once for 1 dose. -     METHYLPREDNISOLONE INJECTION  -     IL THER/PROPH/DIAG INJECTION, SUBCUT/IM  -     triamcinolone acetonide (KENALOG) 0.1 % topical cream; Apply  to affected area three (3) times daily as needed for Skin Irritation. use thin layer     increase lantus to 120 for next couple days and increase SS by 4u as well. Fortunately he has freestyle [place] and he will keep a close eye on his sugars. Follow-up and Dispositions    · Return if symptoms worsen or fail to improve. I have discussed the diagnosis with the patient and the intended plan as seen in the above orders. The patient has received an after-visit summary and questions were answered concerning future plans. Pt conveyed understanding of plan.     Medication Side Effects and Warnings were discussed with patient    An electronic signature was used to authenticate this note  Linda Helms DO

## 2021-08-06 NOTE — PATIENT INSTRUCTIONS
A Healthy Lifestyle: Care Instructions  Your Care Instructions     A healthy lifestyle can help you feel good, stay at a healthy weight, and have plenty of energy for both work and play. A healthy lifestyle is something you can share with your whole family. A healthy lifestyle also can lower your risk for serious health problems, such as high blood pressure, heart disease, and diabetes. You can follow a few steps listed below to improve your health and the health of your family. Follow-up care is a key part of your treatment and safety. Be sure to make and go to all appointments, and call your doctor if you are having problems. It's also a good idea to know your test results and keep a list of the medicines you take. How can you care for yourself at home? · Do not eat too much sugar, fat, or fast foods. You can still have dessert and treats now and then. The goal is moderation. · Start small to improve your eating habits. Pay attention to portion sizes, drink less juice and soda pop, and eat more fruits and vegetables. ? Eat a healthy amount of food. A 3-ounce serving of meat, for example, is about the size of a deck of cards. Fill the rest of your plate with vegetables and whole grains. ? Limit the amount of soda and sports drinks you have every day. Drink more water when you are thirsty. ? Eat plenty of fruits and vegetables every day. Have an apple or some carrot sticks as an afternoon snack instead of a candy bar. Try to have fruits and/or vegetables at every meal.  · Make exercise part of your daily routine. You may want to start with simple activities, such as walking, bicycling, or slow swimming. Try to be active 30 to 60 minutes every day. You do not need to do all 30 to 60 minutes all at once. For example, you can exercise 3 times a day for 10 or 20 minutes.  Moderate exercise is safe for most people, but it is always a good idea to talk to your doctor before starting an exercise program.  · Keep moving. Emily Galindo the lawn, work in the garden, or [employer]. Take the stairs instead of the elevator at work. · If you smoke, quit. People who smoke have an increased risk for heart attack, stroke, cancer, and other lung illnesses. Quitting is hard, but there are ways to boost your chance of quitting tobacco for good. ? Use nicotine gum, patches, or lozenges. ? Ask your doctor about stop-smoking programs and medicines. ? Keep trying. In addition to reducing your risk of diseases in the future, you will notice some benefits soon after you stop using tobacco. If you have shortness of breath or asthma symptoms, they will likely get better within a few weeks after you quit. · Limit how much alcohol you drink. Moderate amounts of alcohol (up to 2 drinks a day for men, 1 drink a day for women) are okay. But drinking too much can lead to liver problems, high blood pressure, and other health problems. Family health  If you have a family, there are many things you can do together to improve your health. · Eat meals together as a family as often as possible. · Eat healthy foods. This includes fruits, vegetables, lean meats and dairy, and whole grains. · Include your family in your fitness plan. Most people think of activities such as jogging or tennis as the way to fitness, but there are many ways you and your family can be more active. Anything that makes you breathe hard and gets your heart pumping is exercise. Here are some tips:  ? Walk to do errands or to take your child to school or the bus.  ? Go for a family bike ride after dinner instead of watching TV. Where can you learn more? Go to http://www.gray.com/  Enter P522 in the search box to learn more about \"A Healthy Lifestyle: Care Instructions. \"  Current as of: September 23, 2020               Content Version: 12.8  © 4179-7473 Healthwise, Incorporated.    Care instructions adapted under license by [employer] (which disclaims liability or warranty for this information). If you have questions about a medical condition or this instruction, always ask your healthcare professional. [address] any warranty or liability for your use of this information.

## 2021-09-02 LAB — HBA1C MFR BLD HPLC: 7.9 %

## 2021-11-22 LAB — SARS-COV-2, NAA: DETECTED

## 2021-11-23 RX ORDER — ONDANSETRON 4 MG/1
4 TABLET, ORALLY DISINTEGRATING ORAL
Qty: 20 TABLET | Refills: 3 | Status: SHIPPED | OUTPATIENT
Start: 2021-11-23 | End: 2021-12-21

## 2021-11-23 NOTE — TELEPHONE ENCOUNTER
Patient's wife/Maura requesting something for patient's nausea as he has COVID and can't eat or drink anything in a couple of days. Pharmacy on file.  Maura's phone: [phone] or home [phone]

## 2021-11-26 ENCOUNTER — HOSPITAL ENCOUNTER (INPATIENT)
Age: 61
LOS: 9 days | Discharge: HOME HEALTH | DRG: 177 | End: 2021-12-05
Attending: EMERGENCY MEDICINE | Admitting: FAMILY MEDICINE
Payer: COMMERCIAL

## 2021-11-26 ENCOUNTER — TELEPHONE (OUTPATIENT)
Dept: FAMILY MEDICINE CLINIC | Age: 61
End: 2021-11-26

## 2021-11-26 ENCOUNTER — APPOINTMENT (OUTPATIENT)
Dept: GENERAL RADIOLOGY | Age: 61
DRG: 177 | End: 2021-11-26
Attending: EMERGENCY MEDICINE
Payer: COMMERCIAL

## 2021-11-26 DIAGNOSIS — N40.0 BENIGN PROSTATIC HYPERPLASIA, UNSPECIFIED WHETHER LOWER URINARY TRACT SYMPTOMS PRESENT: ICD-10-CM

## 2021-11-26 DIAGNOSIS — E11.9 TYPE 2 DIABETES MELLITUS WITHOUT COMPLICATION, WITH LONG-TERM CURRENT USE OF INSULIN (HCC): Chronic | ICD-10-CM

## 2021-11-26 DIAGNOSIS — I89.0 CHRONIC ACQUIRED LYMPHEDEMA: ICD-10-CM

## 2021-11-26 DIAGNOSIS — E66.01 OBESITY, CLASS III, BMI 40-49.9 (MORBID OBESITY) (HCC): ICD-10-CM

## 2021-11-26 DIAGNOSIS — E87.20 LACTIC ACIDOSIS: ICD-10-CM

## 2021-11-26 DIAGNOSIS — R79.89 POSITIVE D DIMER: ICD-10-CM

## 2021-11-26 DIAGNOSIS — U07.1 ACUTE HYPOXEMIC RESPIRATORY FAILURE DUE TO COVID-19 (HCC): ICD-10-CM

## 2021-11-26 DIAGNOSIS — I10 PRIMARY HYPERTENSION: Chronic | ICD-10-CM

## 2021-11-26 DIAGNOSIS — E11.65 TYPE 2 DIABETES MELLITUS WITH HYPERGLYCEMIA, WITH LONG-TERM CURRENT USE OF INSULIN (HCC): ICD-10-CM

## 2021-11-26 DIAGNOSIS — E87.6 HYPOKALEMIA: ICD-10-CM

## 2021-11-26 DIAGNOSIS — I89.0 LYMPHEDEMA: ICD-10-CM

## 2021-11-26 DIAGNOSIS — Z79.4 TYPE 2 DIABETES MELLITUS WITHOUT COMPLICATION, WITH LONG-TERM CURRENT USE OF INSULIN (HCC): Chronic | ICD-10-CM

## 2021-11-26 DIAGNOSIS — Z86.718 HISTORY OF DVT (DEEP VEIN THROMBOSIS): ICD-10-CM

## 2021-11-26 DIAGNOSIS — U07.1 COVID: ICD-10-CM

## 2021-11-26 DIAGNOSIS — R09.02 HYPOXIA: Primary | ICD-10-CM

## 2021-11-26 DIAGNOSIS — E78.5 HYPERLIPIDEMIA, UNSPECIFIED HYPERLIPIDEMIA TYPE: Chronic | ICD-10-CM

## 2021-11-26 DIAGNOSIS — Z79.4 TYPE 2 DIABETES MELLITUS WITH HYPERGLYCEMIA, WITH LONG-TERM CURRENT USE OF INSULIN (HCC): ICD-10-CM

## 2021-11-26 DIAGNOSIS — J96.01 ACUTE HYPOXEMIC RESPIRATORY FAILURE DUE TO COVID-19 (HCC): ICD-10-CM

## 2021-11-26 LAB
ALBUMIN SERPL-MCNC: 3 G/DL (ref 3.5–5)
ALBUMIN/GLOB SERPL: 0.7 {RATIO} (ref 1.1–2.2)
ALP SERPL-CCNC: 56 U/L (ref 45–117)
ALT SERPL-CCNC: 42 U/L (ref 12–78)
ANION GAP SERPL CALC-SCNC: 7 MMOL/L (ref 5–15)
APPEARANCE UR: CLEAR
AST SERPL-CCNC: 38 U/L (ref 15–37)
BACTERIA URNS QL MICRO: NEGATIVE /HPF
BASOPHILS # BLD: 0 K/UL (ref 0–0.1)
BASOPHILS NFR BLD: 0 % (ref 0–1)
BILIRUB SERPL-MCNC: 0.8 MG/DL (ref 0.2–1)
BILIRUB UR QL: NEGATIVE
BNP SERPL-MCNC: 45 PG/ML
BUN SERPL-MCNC: 20 MG/DL (ref 6–20)
BUN/CREAT SERPL: 16 (ref 12–20)
CALCIUM SERPL-MCNC: 8.5 MG/DL (ref 8.5–10.1)
CHLORIDE SERPL-SCNC: 97 MMOL/L (ref 97–108)
CO2 SERPL-SCNC: 30 MMOL/L (ref 21–32)
COLOR UR: ABNORMAL
COMMENT, HOLDF: NORMAL
COVID-19 RAPID TEST, COVR: DETECTED
CREAT SERPL-MCNC: 1.23 MG/DL (ref 0.7–1.3)
CRP SERPL-MCNC: 3.18 MG/DL (ref 0–0.6)
D DIMER PPP FEU-MCNC: 0.33 MG/L FEU (ref 0–0.65)
DIFFERENTIAL METHOD BLD: ABNORMAL
EOSINOPHIL # BLD: 0 K/UL (ref 0–0.4)
EOSINOPHIL NFR BLD: 0 % (ref 0–7)
EPITH CASTS URNS QL MICRO: ABNORMAL /LPF
ERYTHROCYTE [DISTWIDTH] IN BLOOD BY AUTOMATED COUNT: 15.8 % (ref 11.5–14.5)
FERRITIN SERPL-MCNC: 368 NG/ML (ref 26–388)
GLOBULIN SER CALC-MCNC: 4.1 G/DL (ref 2–4)
GLUCOSE BLD STRIP.AUTO-MCNC: 360 MG/DL (ref 65–117)
GLUCOSE SERPL-MCNC: 201 MG/DL (ref 65–100)
GLUCOSE UR STRIP.AUTO-MCNC: NEGATIVE MG/DL
HCT VFR BLD AUTO: 40.4 % (ref 36.6–50.3)
HGB BLD-MCNC: 13.5 G/DL (ref 12.1–17)
HGB UR QL STRIP: NEGATIVE
HYALINE CASTS URNS QL MICRO: ABNORMAL /LPF (ref 0–5)
IMM GRANULOCYTES # BLD AUTO: 0 K/UL
IMM GRANULOCYTES NFR BLD AUTO: 0 %
KETONES UR QL STRIP.AUTO: NEGATIVE MG/DL
LACTATE SERPL-SCNC: 2.4 MMOL/L (ref 0.4–2)
LACTATE SERPL-SCNC: 3 MMOL/L (ref 0.4–2)
LDH SERPL L TO P-CCNC: 340 U/L (ref 85–241)
LEUKOCYTE ESTERASE UR QL STRIP.AUTO: NEGATIVE
LYMPHOCYTES # BLD: 0.7 K/UL (ref 0.8–3.5)
LYMPHOCYTES NFR BLD: 15 % (ref 12–49)
MAGNESIUM SERPL-MCNC: 1.9 MG/DL (ref 1.6–2.4)
MCH RBC QN AUTO: 29.1 PG (ref 26–34)
MCHC RBC AUTO-ENTMCNC: 33.4 G/DL (ref 30–36.5)
MCV RBC AUTO: 87.1 FL (ref 80–99)
MONOCYTES # BLD: 0.4 K/UL (ref 0–1)
MONOCYTES NFR BLD: 9 % (ref 5–13)
NEUTS BAND NFR BLD MANUAL: 1 % (ref 0–6)
NEUTS SEG # BLD: 3.5 K/UL (ref 1.8–8)
NEUTS SEG NFR BLD: 75 % (ref 32–75)
NITRITE UR QL STRIP.AUTO: NEGATIVE
NRBC # BLD: 0 K/UL (ref 0–0.01)
NRBC BLD-RTO: 0 PER 100 WBC
PH UR STRIP: 6 [PH] (ref 5–8)
PLATELET # BLD AUTO: 186 K/UL (ref 150–400)
PLATELET COMMENTS,PCOM: ABNORMAL
PMV BLD AUTO: 10.5 FL (ref 8.9–12.9)
POTASSIUM SERPL-SCNC: 3.1 MMOL/L (ref 3.5–5.1)
PROCALCITONIN SERPL-MCNC: 0.07 NG/ML
PROT SERPL-MCNC: 7.1 G/DL (ref 6.4–8.2)
PROT UR STRIP-MCNC: 30 MG/DL
RBC # BLD AUTO: 4.64 M/UL (ref 4.1–5.7)
RBC #/AREA URNS HPF: ABNORMAL /HPF (ref 0–5)
RBC MORPH BLD: ABNORMAL
SAMPLES BEING HELD,HOLD: NORMAL
SERVICE CMNT-IMP: ABNORMAL
SODIUM SERPL-SCNC: 134 MMOL/L (ref 136–145)
SOURCE, COVRS: ABNORMAL
SP GR UR REFRACTOMETRY: 1.03 (ref 1–1.03)
TROPONIN-HIGH SENSITIVITY: 15 NG/L (ref 0–76)
TROPONIN-HIGH SENSITIVITY: 22 NG/L (ref 0–76)
UA: UC IF INDICATED,UAUC: ABNORMAL
UROBILINOGEN UR QL STRIP.AUTO: 0.2 EU/DL (ref 0.2–1)
WBC # BLD AUTO: 4.6 K/UL (ref 4.1–11.1)
WBC URNS QL MICRO: ABNORMAL /HPF (ref 0–4)

## 2021-11-26 PROCEDURE — 87591 N.GONORRHOEAE DNA AMP PROB: CPT

## 2021-11-26 PROCEDURE — 82962 GLUCOSE BLOOD TEST: CPT

## 2021-11-26 PROCEDURE — 85379 FIBRIN DEGRADATION QUANT: CPT

## 2021-11-26 PROCEDURE — 74011250637 HC RX REV CODE- 250/637: Performed by: STUDENT IN AN ORGANIZED HEALTH CARE EDUCATION/TRAINING PROGRAM

## 2021-11-26 PROCEDURE — 74011250636 HC RX REV CODE- 250/636: Performed by: STUDENT IN AN ORGANIZED HEALTH CARE EDUCATION/TRAINING PROGRAM

## 2021-11-26 PROCEDURE — 87635 SARS-COV-2 COVID-19 AMP PRB: CPT

## 2021-11-26 PROCEDURE — 77010033678 HC OXYGEN DAILY

## 2021-11-26 PROCEDURE — 36415 COLL VENOUS BLD VENIPUNCTURE: CPT

## 2021-11-26 PROCEDURE — 80053 COMPREHEN METABOLIC PANEL: CPT

## 2021-11-26 PROCEDURE — 83735 ASSAY OF MAGNESIUM: CPT

## 2021-11-26 PROCEDURE — 84145 PROCALCITONIN (PCT): CPT

## 2021-11-26 PROCEDURE — 74011250636 HC RX REV CODE- 250/636: Performed by: EMERGENCY MEDICINE

## 2021-11-26 PROCEDURE — 74011250637 HC RX REV CODE- 250/637: Performed by: EMERGENCY MEDICINE

## 2021-11-26 PROCEDURE — 83880 ASSAY OF NATRIURETIC PEPTIDE: CPT

## 2021-11-26 PROCEDURE — 87040 BLOOD CULTURE FOR BACTERIA: CPT

## 2021-11-26 PROCEDURE — 83615 LACTATE (LD) (LDH) ENZYME: CPT

## 2021-11-26 PROCEDURE — 83605 ASSAY OF LACTIC ACID: CPT

## 2021-11-26 PROCEDURE — 99285 EMERGENCY DEPT VISIT HI MDM: CPT

## 2021-11-26 PROCEDURE — 84484 ASSAY OF TROPONIN QUANT: CPT

## 2021-11-26 PROCEDURE — C9113 INJ PANTOPRAZOLE SODIUM, VIA: HCPCS | Performed by: STUDENT IN AN ORGANIZED HEALTH CARE EDUCATION/TRAINING PROGRAM

## 2021-11-26 PROCEDURE — 65660000000 HC RM CCU STEPDOWN

## 2021-11-26 PROCEDURE — 94761 N-INVAS EAR/PLS OXIMETRY MLT: CPT

## 2021-11-26 PROCEDURE — 51798 US URINE CAPACITY MEASURE: CPT

## 2021-11-26 PROCEDURE — 71045 X-RAY EXAM CHEST 1 VIEW: CPT

## 2021-11-26 PROCEDURE — 93005 ELECTROCARDIOGRAM TRACING: CPT

## 2021-11-26 PROCEDURE — 86140 C-REACTIVE PROTEIN: CPT

## 2021-11-26 PROCEDURE — 82728 ASSAY OF FERRITIN: CPT

## 2021-11-26 PROCEDURE — 51701 INSERT BLADDER CATHETER: CPT

## 2021-11-26 PROCEDURE — 96374 THER/PROPH/DIAG INJ IV PUSH: CPT

## 2021-11-26 PROCEDURE — 85025 COMPLETE CBC W/AUTO DIFF WBC: CPT

## 2021-11-26 PROCEDURE — 81001 URINALYSIS AUTO W/SCOPE: CPT

## 2021-11-26 RX ORDER — MAGNESIUM SULFATE 100 %
4 CRYSTALS MISCELLANEOUS AS NEEDED
Status: DISCONTINUED | OUTPATIENT
Start: 2021-11-26 | End: 2021-12-05 | Stop reason: HOSPADM

## 2021-11-26 RX ORDER — DEXTROSE 50 % IN WATER (D50W) INTRAVENOUS SYRINGE
12.5-25 AS NEEDED
Status: DISCONTINUED | OUTPATIENT
Start: 2021-11-26 | End: 2021-12-05 | Stop reason: HOSPADM

## 2021-11-26 RX ORDER — SODIUM CHLORIDE 9 MG/ML
140 INJECTION, SOLUTION INTRAVENOUS CONTINUOUS
Status: DISCONTINUED | OUTPATIENT
Start: 2021-11-26 | End: 2021-11-27

## 2021-11-26 RX ORDER — ATORVASTATIN CALCIUM 20 MG/1
40 TABLET, FILM COATED ORAL DAILY
Status: DISCONTINUED | OUTPATIENT
Start: 2021-11-27 | End: 2021-12-05 | Stop reason: HOSPADM

## 2021-11-26 RX ORDER — INSULIN GLARGINE 300 U/ML
100 INJECTION, SOLUTION SUBCUTANEOUS
COMMUNITY

## 2021-11-26 RX ORDER — DEXAMETHASONE 6 MG/1
6 TABLET ORAL DAILY
Status: DISCONTINUED | OUTPATIENT
Start: 2021-11-27 | End: 2021-11-28

## 2021-11-26 RX ORDER — TAMSULOSIN HYDROCHLORIDE 0.4 MG/1
0.4 CAPSULE ORAL DAILY
Status: DISCONTINUED | OUTPATIENT
Start: 2021-11-27 | End: 2021-12-05 | Stop reason: HOSPADM

## 2021-11-26 RX ORDER — ASPIRIN 81 MG/1
81 TABLET ORAL DAILY
Status: DISCONTINUED | OUTPATIENT
Start: 2021-11-27 | End: 2021-11-26

## 2021-11-26 RX ORDER — HYDROCHLOROTHIAZIDE 25 MG/1
25 TABLET ORAL DAILY
Status: DISCONTINUED | OUTPATIENT
Start: 2021-11-27 | End: 2021-12-05 | Stop reason: HOSPADM

## 2021-11-26 RX ORDER — DEXAMETHASONE SODIUM PHOSPHATE 10 MG/ML
10 INJECTION INTRAMUSCULAR; INTRAVENOUS ONCE
Status: COMPLETED | OUTPATIENT
Start: 2021-11-26 | End: 2021-11-26

## 2021-11-26 RX ORDER — POLYETHYLENE GLYCOL 3350 17 G/17G
17 POWDER, FOR SOLUTION ORAL DAILY PRN
Status: DISCONTINUED | OUTPATIENT
Start: 2021-11-26 | End: 2021-12-01

## 2021-11-26 RX ORDER — INSULIN LISPRO 100 [IU]/ML
INJECTION, SOLUTION INTRAVENOUS; SUBCUTANEOUS
Status: DISCONTINUED | OUTPATIENT
Start: 2021-11-26 | End: 2021-11-28

## 2021-11-26 RX ORDER — INSULIN GLARGINE 100 [IU]/ML
80 INJECTION, SOLUTION SUBCUTANEOUS
Status: DISCONTINUED | OUTPATIENT
Start: 2021-11-26 | End: 2021-11-27

## 2021-11-26 RX ORDER — HYDROXYZINE 25 MG/1
50 TABLET, FILM COATED ORAL
Status: DISCONTINUED | OUTPATIENT
Start: 2021-11-26 | End: 2021-11-26

## 2021-11-26 RX ORDER — ACETAMINOPHEN 650 MG/1
650 SUPPOSITORY RECTAL
Status: DISCONTINUED | OUTPATIENT
Start: 2021-11-26 | End: 2021-12-05 | Stop reason: HOSPADM

## 2021-11-26 RX ORDER — ASCORBIC ACID 500 MG
500 TABLET ORAL 2 TIMES DAILY
Status: DISCONTINUED | OUTPATIENT
Start: 2021-11-26 | End: 2021-12-05 | Stop reason: HOSPADM

## 2021-11-26 RX ORDER — DILTIAZEM HYDROCHLORIDE 120 MG/1
240 CAPSULE, COATED, EXTENDED RELEASE ORAL DAILY
Status: DISCONTINUED | OUTPATIENT
Start: 2021-11-27 | End: 2021-12-05 | Stop reason: HOSPADM

## 2021-11-26 RX ORDER — POTASSIUM CHLORIDE 750 MG/1
40 TABLET, FILM COATED, EXTENDED RELEASE ORAL
Status: COMPLETED | OUTPATIENT
Start: 2021-11-26 | End: 2021-11-26

## 2021-11-26 RX ORDER — METOPROLOL SUCCINATE 50 MG/1
50 TABLET, EXTENDED RELEASE ORAL DAILY
Status: DISCONTINUED | OUTPATIENT
Start: 2021-11-27 | End: 2021-12-05 | Stop reason: HOSPADM

## 2021-11-26 RX ORDER — SODIUM CHLORIDE 0.9 % (FLUSH) 0.9 %
5-40 SYRINGE (ML) INJECTION EVERY 8 HOURS
Status: DISCONTINUED | OUTPATIENT
Start: 2021-11-26 | End: 2021-12-05 | Stop reason: HOSPADM

## 2021-11-26 RX ORDER — ZINC SULFATE 50(220)MG
1 CAPSULE ORAL DAILY
Status: DISCONTINUED | OUTPATIENT
Start: 2021-11-26 | End: 2021-12-05 | Stop reason: HOSPADM

## 2021-11-26 RX ORDER — ALFUZOSIN HYDROCHLORIDE 10 MG/1
10 TABLET, EXTENDED RELEASE ORAL DAILY
Status: DISCONTINUED | OUTPATIENT
Start: 2021-11-27 | End: 2021-11-26 | Stop reason: CLARIF

## 2021-11-26 RX ORDER — ACETAMINOPHEN 325 MG/1
650 TABLET ORAL
Status: DISCONTINUED | OUTPATIENT
Start: 2021-11-26 | End: 2021-12-05 | Stop reason: HOSPADM

## 2021-11-26 RX ORDER — INSULIN LISPRO 100 [IU]/ML
5 INJECTION, SOLUTION INTRAVENOUS; SUBCUTANEOUS ONCE
Status: COMPLETED | OUTPATIENT
Start: 2021-11-27 | End: 2021-11-27

## 2021-11-26 RX ORDER — INSULIN GLARGINE 100 [IU]/ML
17 INJECTION, SOLUTION SUBCUTANEOUS DAILY
Status: DISCONTINUED | OUTPATIENT
Start: 2021-11-27 | End: 2021-11-26

## 2021-11-26 RX ORDER — LEVOFLOXACIN 5 MG/ML
750 INJECTION, SOLUTION INTRAVENOUS EVERY 24 HOURS
Status: DISCONTINUED | OUTPATIENT
Start: 2021-11-26 | End: 2021-11-27

## 2021-11-26 RX ORDER — SODIUM CHLORIDE 0.9 % (FLUSH) 0.9 %
5-40 SYRINGE (ML) INJECTION AS NEEDED
Status: DISCONTINUED | OUTPATIENT
Start: 2021-11-26 | End: 2021-12-05 | Stop reason: HOSPADM

## 2021-11-26 RX ADMIN — Medication 10 ML: at 18:27

## 2021-11-26 RX ADMIN — SODIUM CHLORIDE 100 ML/HR: 9 INJECTION, SOLUTION INTRAVENOUS at 20:14

## 2021-11-26 RX ADMIN — POTASSIUM CHLORIDE 40 MEQ: 750 TABLET, FILM COATED, EXTENDED RELEASE ORAL at 17:07

## 2021-11-26 RX ADMIN — SODIUM CHLORIDE 1000 ML: 9 INJECTION, SOLUTION INTRAVENOUS at 17:08

## 2021-11-26 RX ADMIN — Medication 1 CAPSULE: at 18:26

## 2021-11-26 RX ADMIN — LEVOFLOXACIN 750 MG: 750 INJECTION, SOLUTION INTRAVENOUS at 20:13

## 2021-11-26 RX ADMIN — APIXABAN 5 MG: 5 TABLET, FILM COATED ORAL at 20:13

## 2021-11-26 RX ADMIN — PANTOPRAZOLE SODIUM 40 MG: 40 INJECTION, POWDER, FOR SOLUTION INTRAVENOUS at 18:26

## 2021-11-26 RX ADMIN — DEXAMETHASONE SODIUM PHOSPHATE 10 MG: 10 INJECTION, SOLUTION INTRAMUSCULAR; INTRAVENOUS at 17:07

## 2021-11-26 RX ADMIN — FAMOTIDINE 20 MG: 10 INJECTION, SOLUTION INTRAVENOUS at 20:13

## 2021-11-26 RX ADMIN — OXYCODONE HYDROCHLORIDE AND ACETAMINOPHEN 500 MG: 500 TABLET ORAL at 18:26

## 2021-11-26 NOTE — ED PROVIDER NOTES
Deidra Yung is a 63 yo M with h/o Diabetes, HTN and hypercholesterolemia who was diagnosed with COVID 1 week ago. For the past week he has had shortness of breath, cough and nasuea. He has not had vomiting but has had decreased appetite. His wife called his PCP who advised he come to the ED so she called 911. When EMS arrived his O2 saturation was 88% on room air.              Past Medical History:   Diagnosis Date    AR (allergic rhinitis) 2009    Diabetes ([address]     Dr. Marylee Sauers    History of vascular access device 2020    4f single picc placed on r cephalic, 98DK at 3cm out placed by CARMEN Lowry, difficult limited access    Hypercholesterolemia     Hypertension        Past Surgical History:   Procedure Laterality Date    HX HEART CATHETERIZATION      normal         Family History:   Problem Relation Age of Onset    Heart Failure Father     Diabetes Brother     Heart Failure Paternal Grandfather          age 37 AMI       Social History     Socioeconomic History    Marital status:      Spouse name: Not on file    Number of children: Not on file    Years of education: Not on file    Highest education level: Not on file   Occupational History    Occupation:    Tobacco Use    Smoking status: Former Smoker     Packs/day: 1.00     Years: 15.00     Pack years: 15.00     Types: Cigarettes, Pipe, Cigars     Quit date: 3/12/1991     Years since quittin.7    Smokeless tobacco: Never Used   Substance and Sexual Activity    Alcohol use: No    Drug use: No    Sexual activity: Not on file   Other Topics Concern     Service Not Asked    Blood Transfusions Not Asked    Caffeine Concern Not Asked    Occupational Exposure Not Asked    Hobby Hazards Not Asked    Sleep Concern Not Asked    Stress Concern Not Asked    Weight Concern Not Asked    Special Diet Not Asked    Back Care Not Asked    Exercise No    Bike Helmet Not Asked    [facility] Not Asked    Self-Exams Not Asked   Social History Narrative    Not on file     Social Determinants of Health     Financial Resource Strain:     Difficulty of Paying Living Expenses: Not on file   Food Insecurity:     Worried About Running Out of Food in the Last Year: Not on file    Jn of Food in the Last Year: Not on file   Transportation Needs:     Lack of Transportation (Medical): Not on file    Lack of Transportation (Non-Medical): Not on file   Physical Activity:     Days of Exercise per Week: Not on file    Minutes of Exercise per Session: Not on file   Stress:     Feeling of Stress : Not on file   Social Connections:     Frequency of Communication with Friends and Family: Not on file    Frequency of Social Gatherings with Friends and Family: Not on file    Attends Jainism Services: Not on file    Active Member of [address] [place] [address] or Organizations: Not on file    Attends Club or Organization Meetings: Not on file    Marital Status: Not on file   Intimate Partner Violence:     Fear of Current or Ex-Partner: Not on file    Emotionally Abused: Not on file    Physically Abused: Not on file    Sexually Abused: Not on file   Housing Stability:     Unable to Pay for Housing in the Last Year: Not on file    Number of [place] in the Last Year: Not on file    Unstable Housing in the Last Year: Not on file         ALLERGIES: Patient has no known allergies. Review of Systems   Constitutional: Positive for appetite change and fever. HENT: Negative for sore throat. Mouth sores: when coughing. Eyes: Negative for visual disturbance. Respiratory: Positive for cough and shortness of breath. Cardiovascular: Positive for chest pain. Gastrointestinal: Positive for nausea. Negative for vomiting. Genitourinary: Negative for dysuria. Musculoskeletal: Negative for back pain. Skin: Negative for rash. Neurological: Negative for headaches.        Vitals:    11/26/21 1454 11/26/21 1620   BP: (!) 159/68 Pulse: 78    Resp: 30    Temp: 99.2 °F (37.3 °C)    SpO2: 93% 91%   Weight: (!) 170.1 kg (375 lb)    Height: 6' 2\" (1.88 m)             Physical Exam  Vitals and nursing note reviewed. Constitutional:       General: He is not in acute distress. Appearance: He is well-developed. He is obese. HENT:      Head: Normocephalic and atraumatic. Eyes:      Conjunctiva/sclera: Conjunctivae normal.   Neck:      Trachea: Phonation normal.   Cardiovascular:      Rate and Rhythm: Normal rate. Pulmonary:      Effort: Pulmonary effort is normal. No respiratory distress. Breath sounds: Rhonchi present. No wheezing or rales. Abdominal:      General: There is no distension. Tenderness: There is no abdominal tenderness. Musculoskeletal:         General: No tenderness. Normal range of motion. Cervical back: Normal range of motion. Skin:     General: Skin is warm and dry. Neurological:      Mental Status: He is alert. He is not disoriented. Motor: No abnormal muscle tone. [facility]       Perfect Serve Consult for Admission  4:49 PM    ED Room Number: GO40/89  Patient Name and age:  Gwyn Mclean 64 y.o.  male  Working Diagnosis:   1. Hypoxia    2. Hypokalemia    3. COVID        COVID-19 Suspicion:  yes  Sepsis present:  yes  Reassessment needed: yes  Code Status:  Full Code  Readmission: no  Isolation Requirements:  yes  Recommended Level of Care:  telemetry  Department:[facility] ED - [phone]  Other:  Patient with cough, shortness or breath and nausea with decreased appetite for past week. Feeling increased weakness unable to ambulate. O2 sat mid 80's on room air when EMS arrived 94% on 2L. Lactic acid 3.0Has received 10mg dexamethasone in ED.   As 40meq KCL PO and 1 L IVNS   Procedures

## 2021-11-26 NOTE — PROGRESS NOTES
11/26/21 1740 11/26/21 1743 11/26/21 1745   RT Walking Oximetry   Stage Resting (Room Air) During Walk (Room Air) During Walk (on O2)   SpO2 (!) 87 % (!) 83 % 90 %   HR 80 bpm 89 bpm 88 bpm   Rate of Dyspnea 0 1 0   O2 Flow Rate (L/min)  --   --  2 l/min      11/26/21 1752   RT Walking Oximetry   Stage After Walk   SpO2 92 %   HR 85 bpm   Rate of Dyspnea 0   O2 Flow Rate (L/min) 2 l/min

## 2021-11-26 NOTE — H&P
[address] [facility] [address] [place] [address]   Office [phone]  Fax [phone]       Admission H&P     Name: Gomez Hu MRN: [id number]  Sex: Male   YOB: 1960  Age: 64 y.o. PCP: Chente Rodríguez DO     Source of Information: patient, medical records    Chief complaint: shortness of breath and cough    History of Present Illness  Gomez Hu is a 64 y.o. unvaccinated male with PMHx of HTN, DM2, HLD, chronic lymphedema, history of right femoral DVT in 2020 now on [facility], who presents to the ED complaining of shortness of breath, cough, and decreased oral intake. He states that he tested positive for COVID on 11/18/21, and symptom onset was 11/18/21. Was at a [employer] dinner prior to onset of symptoms and was later notified that 25 of the couples present at the dinner had tested positive for COVID. Therefore, when he began experiencing shortness of breath, he and his wife performed home COVID tests which were positive, then re-tested at [facility] on 11/19, where PCR and rapid COVID tests were reportedly positive as well. Since onset, he has experienced cough with productive clear sputum, nasal congestion, poor PO intake, nausea (without vomiting), watery diarrhea (non-bloody, not dark), and palpitations on rising from seated position and with any degree of exertion. Had Tmax of 101-102 yesterday evening. Also with dysuria and urinary urgency/frequency. He was having markedly worsened shortness of breath today, which prompted call to emergency services today. COVID Questions: Tested positive for COVID, patient is unvaccinated.       In the ED:  Vitals: Temp 99.2   /68   HR 78   RR 30   SatO2  93% on 2L  Labs: CBC (WBC 4.6, Hb 13.5, ), CMP (Na 134, K 3.1, BUN/Cr 20/1.23), D-dimer 0.33, lactic acid 3.0, procal 0.07, hsTrop 15, proBNP 45  Imaging: CXR showing b/l multifocal airspace disease  Treatment: Dexamethasone 10mg IV, Klor-con 40 mEq, 1L NS    EKG: NSR at 80 bpm, normal intervals, normal axis. QTc 380ms    Patient Vitals for the past 12 hrs:   Temp Pulse Resp BP SpO2   11/26/21 1700  78 28 139/60 91 %   11/26/21 1620     91 %   11/26/21 1454 99.2 °F (37.3 °C) 78 30 (!) 159/68 93 %       Review of Systems  Review of Systems   Constitutional: Positive for activity change, appetite change, chills and fever. HENT: Positive for congestion and rhinorrhea. Respiratory: Positive for cough, chest tightness and shortness of breath. Cardiovascular: Positive for leg swelling. Negative for chest pain and palpitations. Gastrointestinal: Positive for diarrhea and nausea. Negative for constipation and vomiting. Genitourinary: Positive for decreased urine volume, dysuria, frequency and urgency. Hematological: Bruises/bleeds easily. Home Medications   Prior to Admission medications    Medication Sig Start Date End Date Taking? Authorizing Provider   ondansetron (ZOFRAN ODT) 4 mg disintegrating tablet Take 1 Tablet by mouth every eight (8) hours as needed for Nausea or Vomiting. 11/23/21   Shamir Lockett, DO   triamcinolone acetonide (KENALOG) 0.1 % topical cream Apply  to affected area three (3) times daily as needed for Skin Irritation. use thin layer 8/6/21   Shamir Lockett DO   atorvastatin (LIPITOR) 40 mg tablet  2/15/21   Provider, Historical   rivaroxaban (Xarelto) 20 mg tab tablet Take 1 Tab by mouth daily. Indications: blood clot in a deep vein of the extremities  Patient not taking: Reported on 8/6/2021 3/4/21   Robyn Houston MD   metoprolol succinate (TOPROL-XL) 50 mg XL tablet Take 50 mg by mouth daily. Provider, Historical   insulin lispro (HUMALOG) 100 unit/mL kwikpen by SubCUTAneous route Before breakfast, lunch, and dinner. Sliding scale    Provider, Historical   dilTIAZem ER (CARDIZEM LA) 240 mg Tb24 tablet Take 240 mg by mouth daily. Provider, Historical   hydroCHLOROthiazide (HYDRODIURIL) 25 mg tablet Take 25 mg by mouth daily. Provider, Historical   rosuvastatin (CRESTOR) 20 mg tablet Take 20 mg by mouth nightly. Patient not taking: Reported on 2021    Provider, Historical   aspirin delayed-release 81 mg tablet Take 81 mg by mouth daily. Patient not taking: Reported on 2021    Provider, Historical   insulin glargine (LANTUS,BASAGLAR) 100 unit/mL (3 mL) inpn 100 Units by SubCUTAneous route daily. 19   Shamir Lockett,    metFORMIN (GLUCOPHAGE) 1,000 mg tablet Take 1,000 mg by mouth two (2) times daily (with meals). Provider, Historical       Allergies  No Known Allergies    Past Medical History  Past Medical History:   Diagnosis Date    AR (allergic rhinitis) 2009    Diabetes ([address]     Dr. Lady Gan    History of vascular access device 2020    4f single picc placed on r cephalic, 67RB at 3cm out placed by CARMEN Lowry, difficult limited access    Hypercholesterolemia     Hypertension        Previous Hospitalization(s)  Past Surgical History:   Procedure Laterality Date    HX HEART CATHETERIZATION      normal       Family History  Family History   Problem Relation Age of Onset    Heart Failure Father     Diabetes Brother     Heart Failure Paternal Grandfather          age 37 AMI       Social History  Alcohol history: Not at all  Smoking history: Former smoker, smoked 1 ppd x 15 years, quit 31 years ago  Illicit drug history: Not at all  Living arrangement: patient lives with their spouse. Ambulates: Independently     Vital Signs  Visit Vitals  /60   Pulse 78   Temp 99.2 °F (37.3 °C)   Resp 28   Ht 6' 2\" (1.88 m)   Wt (!) 375 lb (170.1 kg)   SpO2 91%   BMI 48.15 kg/m²       Physical Exam  General: No acute distress. Alert. Cooperative. Elderly male lying comfortably on stretcher. Head: Normocephalic. Atraumatic. Respiratory: Bibasilar crackles. No rhonchorous breath sounds or rales. Cardiovascular: RRR. Normal S1,S2. No m/r/g.    GI: + bowel sounds. Nontender.  No rebound tenderness or guarding. Protuberant abdomen. Extremities: 3+ bilateral pitting LE edema with chronic venous stasis changes   Musculoskeletal: Full ROM in all extremities. Slow with movement 2/2 difficulty with respiration. Negative CVAT   Skin: Warm, dry. No rashes. Neuro: Alert and oriented to conversation       Laboratory Data  Recent Results (from the past 8 hour(s))   EKG, 12 LEAD, INITIAL    Collection Time: 11/26/21  3:18 PM   Result Value Ref Range    Ventricular Rate 80 BPM    Atrial Rate 80 BPM    P-R Interval 178 ms    QRS Duration 76 ms    Q-T Interval 330 ms    QTC Calculation (Bezet) 380 ms    Calculated P Axis 25 degrees    Calculated R Axis 14 degrees    Calculated T Axis 73 degrees    Diagnosis       Normal sinus rhythm  Nonspecific ST and T wave abnormality  Abnormal ECG  When compared with ECG of 15-AUG-2020 03:35,  Nonspecific T wave abnormality, worse in Inferior leads  T wave inversion now evident in Anterior leads  QT has shortened     CBC WITH AUTOMATED DIFF    Collection Time: 11/26/21  3:34 PM   Result Value Ref Range    WBC 4.6 4.1 - 11.1 K/uL    RBC 4.64 4.10 - 5.70 M/uL    HGB 13.5 12.1 - 17.0 g/dL    HCT 40.4 36.6 - 50.3 %    MCV 87.1 80.0 - 99.0 FL    MCH 29.1 26.0 - 34.0 PG    MCHC 33.4 30.0 - 36.5 g/dL    RDW 15.8 (H) 11.5 - 14.5 %    PLATELET 317 928 - 200 K/uL    MPV 10.5 8.9 - 12.9 FL    NRBC 0.0 0  WBC    ABSOLUTE NRBC 0.00 0.00 - 0.01 K/uL    NEUTROPHILS 75 32 - 75 %    BAND NEUTROPHILS 1 0 - 6 %    LYMPHOCYTES 15 12 - 49 %    MONOCYTES 9 5 - 13 %    EOSINOPHILS 0 0 - 7 %    BASOPHILS 0 0 - 1 %    IMMATURE GRANULOCYTES 0 %    ABS. NEUTROPHILS 3.5 1.8 - 8.0 K/UL    ABS. LYMPHOCYTES 0.7 (L) 0.8 - 3.5 K/UL    ABS. MONOCYTES 0.4 0.0 - 1.0 K/UL    ABS. EOSINOPHILS 0.0 0.0 - 0.4 K/UL    ABS. BASOPHILS 0.0 0.0 - 0.1 K/UL    ABS. IMM.  GRANS. 0.0 K/UL    DF MANUAL      PLATELET COMMENTS Large Platelets      RBC COMMENTS NORMOCYTIC, NORMOCHROMIC     METABOLIC PANEL, COMPREHENSIVE Collection Time: 11/26/21  3:34 PM   Result Value Ref Range    Sodium 134 (L) 136 - 145 mmol/L    Potassium 3.1 (L) 3.5 - 5.1 mmol/L    Chloride 97 97 - 108 mmol/L    CO2 30 21 - 32 mmol/L    Anion gap 7 5 - 15 mmol/L    Glucose 201 (H) 65 - 100 mg/dL    BUN 20 6 - 20 MG/DL    Creatinine 1.23 0.70 - 1.30 MG/DL    BUN/Creatinine ratio 16 12 - 20      GFR est AA >60 >60 ml/min/1.73m2    GFR est non-AA 60 (L) >60 ml/min/1.73m2    Calcium 8.5 8.5 - 10.1 MG/DL    Bilirubin, total 0.8 0.2 - 1.0 MG/DL    ALT (SGPT) 42 12 - 78 U/L    AST (SGOT) 38 (H) 15 - 37 U/L    Alk. phosphatase 56 45 - 117 U/L    Protein, total 7.1 6.4 - 8.2 g/dL    Albumin 3.0 (L) 3.5 - 5.0 g/dL    Globulin 4.1 (H) 2.0 - 4.0 g/dL    A-G Ratio 0.7 (L) 1.1 - 2.2     PROCALCITONIN    Collection Time: 11/26/21  3:34 PM   Result Value Ref Range    Procalcitonin 0.07 ng/mL   TROPONIN-HIGH SENSITIVITY    Collection Time: 11/26/21  3:34 PM   Result Value Ref Range    Troponin-High Sensitivity 15 0 - 76 ng/L   SAMPLES BEING HELD    Collection Time: 11/26/21  3:34 PM   Result Value Ref Range    SAMPLES BEING HELD 1SST     COMMENT        Add-on orders for these samples will be processed based on acceptable specimen integrity and analyte stability, which may vary by analyte.    NT-PRO BNP    Collection Time: 11/26/21  3:34 PM   Result Value Ref Range    NT pro-BNP 45 <125 PG/ML   MAGNESIUM    Collection Time: 11/26/21  3:34 PM   Result Value Ref Range    Magnesium 1.9 1.6 - 2.4 mg/dL   LACTIC ACID    Collection Time: 11/26/21  3:34 PM   Result Value Ref Range    Lactic acid 3.0 (HH) 0.4 - 2.0 MMOL/L   D DIMER    Collection Time: 11/26/21  3:34 PM   Result Value Ref Range    D-dimer 0.33 0.00 - 0.65 mg/L FEU   COVID-19 RAPID TEST    Collection Time: 11/26/21  4:19 PM   Result Value Ref Range    Specimen source Nasopharyngeal      COVID-19 rapid test Detected (AA) NOTD         Imaging  CXR Results  (Last 48 hours)               11/26/21 1648  XR CHEST PORT Final result    Impression:  Bilateral multifocal airspace disease. Narrative:  INDICATION:  COVID, hypoxia        COMPARISON: August 2020       FINDINGS: Single AP portable view of the chest obtained at 1645 demonstrates a   stable cardiomediastinal silhouette. There is chronic cardiomegaly. The lungs   are hypoinspiratory and the patient is morbidly obese. There is bilateral   multifocal airspace disease, right perihilar and left lung base. CT Results  (Last 48 hours)    None            Assessment and Plan     Amanda Gomes is a 64 y.o. male with a PMHx of HTN, DM2, HLD, chronic lymphedema, history of right femoral DVT in 2020 now on [facility] who is admitted for acute hypoxic respiratory failure 2/2 COVID PNA. Acute hypoxic respiratory failure 2/2 COVID pneumonia: Pt with O2 sat of 88% at time of admission. Symptom onset 11/18. Presenting symptoms acutely worsened on date of admission, prompting presentation to the ED. In the ED, given Decadron IV 10mg, 1L NS, with symptomatic improvement. Pt met 1/4 SIRS criteria on admission, and CURB-65 1/5.  - Admit to remote tele  - Vital signs per unit  - O2 via NC, wean as tolerated  - S/p IV Decadron 10mg in ED, will continue Decadron 5mg PO every day  - COVID meds (Atorvastatin 40mg every day, Vitamin C 500mg BID, and Zinc 220mg every day)  - CBC with diff daily, COVID labs (CRP, D-dimer, ferritin, LD) daily  - Will consider repeat CXR as indicated  - RT consulted to determine home O2 needs  - PT/OT consulted    Lactic acidosis: POA 3.0. Likely in the setting of poor PO intake prior to admission, as well as underlying OHS/AIDEE (likely, but has not been diagnosed at this point)  - Will trend lactate q4h until downtrended  - BMP daily  - S/p 1L NS in the ED  - Continue mIVF at 100cc/hr    History of DVT: On Eliquis at home. F/b Dr. Torey Montoya. Unclear etiology - not provoked per Dr. Bernadine Goode note. Denies fh/o VTE that pt is aware of.  Per chart review, pt started on Xarelto, however pt reportedly taking Eliquis per medication reconciliation with wife over phone.  - Continue Eliquis 5mg BID  - Monitor for symptoms, pt anticoagulated but now at increased risk of VTE given COVID infection  - Up and out of bed as tolerated    Troponinemia: POA troponin of 15. Pt without chest pain or tightness. Low concern for ACS. Per chart review, last [facility] 2007 was wnl.  - Will continue to monitor symptoms, electrolytes  - Patient on remote cardiac monitoring  - Repeat troponin ordered    Dysuria: Noted per pt. Not septic. Can consider Cefepime IV is pt does not improve. Potentially 2/2 decreased UOP in setting of poor PO intake. Will initiate tx empirically for complicated UTI. UCx in past without growth - unable to determine past bacterial species pt is prone to infection from. - Levaquin 750mg IV every day (will plan for 14 day course total)  - UA with reflex cx  - Bladder checks and straight cath PRN    Hypokalemia: POA 3.1. S/p 40mEq PO in ED.  - BMP and Mg daily  - Replete as needed  - Patient on remote tele    HLD: Last lipid panel 08/2020 with , HDL 43, LDL 99, . On Rosuvastatin 20mg daily at home. - Will substitute with Atorvastatin 40mg daily here    T2DM: Last A1c 7.9% in 09/2020. On Lantus 100u QHS, SSI Lispro, Metformin 1000mg BID at home. Will switch to 80% of home dose while admitted. - Lantus 80u QHS   - SSI  - POC BG ACHS  - Hypoglycemia protocols ordered    HTN: POA /68. On Metoprolol XL 50mg daily, Diltiazem 240mg daily, and HCTZ 25mg daily. - Will continue BP meds  - Monitor and adjust as required    Chronic lymphedema: Chronic venous stasis and 3+ pitting edema noted on exam. Pt states he uses compression stockings at home. - Compression stockings ordered    BPH: Chronic, stable. - Continue Alfuzosin 10mg daily - pharm to substitute    Obesity:  - The pt's Body mass index is 48.15 kg/m².   - Encouraging lifestyle modifications and further follow up outpatient. FEN/GI - Diabetic diet. NS at 100 mL/hr. Activity - Out of bed with assistance  DVT prophylaxis - Eliquis  GI prophylaxis - Pepcid 20 BID  Fall prophylaxis - Fall precautions ordered. Disposition - Admit to Remote Telemetry. Plan to d/c to Home. Consulting PT, OT and CM  Code Status - Partial. Discussed with patient / caregivers. Next of Kin Name and Krish Schroeder [address] [facility] - [phone]    Patient Susan Phalen will be discussed with Dr. Gabriela Nice.     5:26 PM, 11/26/21  Ronald Donaldson MD  Family Medicine Resident       For Billing    Chief Complaint   Patient presents with   [facility] Problems  Date Reviewed: 8/6/2021    None

## 2021-11-26 NOTE — TELEPHONE ENCOUNTER
Spoke with pt's wife, states pt has Co-vid and has had temperature for a week, no appetite, nausea medication is not working, very week. Informed to take pt to urgent care or ER for evaluation/tx.

## 2021-11-27 PROBLEM — R77.8 ELEVATED TROPONIN: Status: ACTIVE | Noted: 2021-11-27

## 2021-11-27 PROBLEM — Z86.718 HISTORY OF DVT OF LOWER EXTREMITY: Status: ACTIVE | Noted: 2021-11-27

## 2021-11-27 PROBLEM — I89.0 LYMPHEDEMA: Status: ACTIVE | Noted: 2021-11-27

## 2021-11-27 PROBLEM — N40.0 BPH (BENIGN PROSTATIC HYPERPLASIA): Status: ACTIVE | Noted: 2021-11-27

## 2021-11-27 LAB
ANION GAP SERPL CALC-SCNC: 9 MMOL/L (ref 5–15)
BASOPHILS # BLD: 0 K/UL (ref 0–0.1)
BASOPHILS NFR BLD: 0 % (ref 0–1)
BNP SERPL-MCNC: 240 PG/ML
BUN SERPL-MCNC: 19 MG/DL (ref 6–20)
BUN/CREAT SERPL: 16 (ref 12–20)
CALCIUM SERPL-MCNC: 8 MG/DL (ref 8.5–10.1)
CHLORIDE SERPL-SCNC: 100 MMOL/L (ref 97–108)
CK SERPL-CCNC: 353 U/L (ref 39–308)
CO2 SERPL-SCNC: 24 MMOL/L (ref 21–32)
CREAT SERPL-MCNC: 1.16 MG/DL (ref 0.7–1.3)
CRP SERPL-MCNC: 2.89 MG/DL (ref 0–0.6)
D DIMER PPP FEU-MCNC: 0.34 MG/L FEU (ref 0–0.65)
DIFFERENTIAL METHOD BLD: ABNORMAL
EOSINOPHIL # BLD: 0 K/UL (ref 0–0.4)
EOSINOPHIL NFR BLD: 0 % (ref 0–7)
ERYTHROCYTE [DISTWIDTH] IN BLOOD BY AUTOMATED COUNT: 15.3 % (ref 11.5–14.5)
FERRITIN SERPL-MCNC: 362 NG/ML (ref 26–388)
GLUCOSE BLD STRIP.AUTO-MCNC: 344 MG/DL (ref 65–117)
GLUCOSE BLD STRIP.AUTO-MCNC: 366 MG/DL (ref 65–117)
GLUCOSE BLD STRIP.AUTO-MCNC: 391 MG/DL (ref 65–117)
GLUCOSE BLD STRIP.AUTO-MCNC: 399 MG/DL (ref 65–117)
GLUCOSE BLD STRIP.AUTO-MCNC: 406 MG/DL (ref 65–117)
GLUCOSE BLD STRIP.AUTO-MCNC: 444 MG/DL (ref 65–117)
GLUCOSE SERPL-MCNC: 395 MG/DL (ref 65–100)
HCT VFR BLD AUTO: 37 % (ref 36.6–50.3)
HGB BLD-MCNC: 12.3 G/DL (ref 12.1–17)
IMM GRANULOCYTES # BLD AUTO: 0 K/UL
IMM GRANULOCYTES NFR BLD AUTO: 0 %
LACTATE SERPL-SCNC: 2.2 MMOL/L (ref 0.4–2)
LACTATE SERPL-SCNC: 2.5 MMOL/L (ref 0.4–2)
LACTATE SERPL-SCNC: 3 MMOL/L (ref 0.4–2)
LACTATE SERPL-SCNC: 3.2 MMOL/L (ref 0.4–2)
LDH SERPL L TO P-CCNC: 291 U/L (ref 85–241)
LYMPHOCYTES # BLD: 1.2 K/UL (ref 0.8–3.5)
LYMPHOCYTES NFR BLD: 25 % (ref 12–49)
MAGNESIUM SERPL-MCNC: 1.8 MG/DL (ref 1.6–2.4)
MCH RBC QN AUTO: 29.6 PG (ref 26–34)
MCHC RBC AUTO-ENTMCNC: 33.2 G/DL (ref 30–36.5)
MCV RBC AUTO: 89.2 FL (ref 80–99)
MONOCYTES # BLD: 0.5 K/UL (ref 0–1)
MONOCYTES NFR BLD: 10 % (ref 5–13)
NEUTS BAND NFR BLD MANUAL: 5 % (ref 0–6)
NEUTS SEG # BLD: 2.9 K/UL (ref 1.8–8)
NEUTS SEG NFR BLD: 60 % (ref 32–75)
NRBC # BLD: 0 K/UL (ref 0–0.01)
NRBC BLD-RTO: 0 PER 100 WBC
PLATELET # BLD AUTO: 200 K/UL (ref 150–400)
PMV BLD AUTO: 10.6 FL (ref 8.9–12.9)
POTASSIUM SERPL-SCNC: 3.8 MMOL/L (ref 3.5–5.1)
RBC # BLD AUTO: 4.15 M/UL (ref 4.1–5.7)
RBC MORPH BLD: ABNORMAL
SERVICE CMNT-IMP: ABNORMAL
SODIUM SERPL-SCNC: 133 MMOL/L (ref 136–145)
TROPONIN-HIGH SENSITIVITY: 15 NG/L (ref 0–76)
WBC # BLD AUTO: 4.6 K/UL (ref 4.1–11.1)

## 2021-11-27 PROCEDURE — 97535 SELF CARE MNGMENT TRAINING: CPT

## 2021-11-27 PROCEDURE — 74011636637 HC RX REV CODE- 636/637: Performed by: STUDENT IN AN ORGANIZED HEALTH CARE EDUCATION/TRAINING PROGRAM

## 2021-11-27 PROCEDURE — 80048 BASIC METABOLIC PNL TOTAL CA: CPT

## 2021-11-27 PROCEDURE — 97165 OT EVAL LOW COMPLEX 30 MIN: CPT

## 2021-11-27 PROCEDURE — 2709999900 HC NON-CHARGEABLE SUPPLY

## 2021-11-27 PROCEDURE — 83735 ASSAY OF MAGNESIUM: CPT

## 2021-11-27 PROCEDURE — 85379 FIBRIN DEGRADATION QUANT: CPT

## 2021-11-27 PROCEDURE — 74011636637 HC RX REV CODE- 636/637

## 2021-11-27 PROCEDURE — 65660000000 HC RM CCU STEPDOWN

## 2021-11-27 PROCEDURE — 84484 ASSAY OF TROPONIN QUANT: CPT

## 2021-11-27 PROCEDURE — 83615 LACTATE (LD) (LDH) ENZYME: CPT

## 2021-11-27 PROCEDURE — 86140 C-REACTIVE PROTEIN: CPT

## 2021-11-27 PROCEDURE — 82550 ASSAY OF CK (CPK): CPT

## 2021-11-27 PROCEDURE — 99223 1ST HOSP IP/OBS HIGH 75: CPT | Performed by: FAMILY MEDICINE

## 2021-11-27 PROCEDURE — 83605 ASSAY OF LACTIC ACID: CPT

## 2021-11-27 PROCEDURE — 74011250637 HC RX REV CODE- 250/637: Performed by: STUDENT IN AN ORGANIZED HEALTH CARE EDUCATION/TRAINING PROGRAM

## 2021-11-27 PROCEDURE — 77010033678 HC OXYGEN DAILY

## 2021-11-27 PROCEDURE — 74011250636 HC RX REV CODE- 250/636: Performed by: STUDENT IN AN ORGANIZED HEALTH CARE EDUCATION/TRAINING PROGRAM

## 2021-11-27 PROCEDURE — 36415 COLL VENOUS BLD VENIPUNCTURE: CPT

## 2021-11-27 PROCEDURE — 82962 GLUCOSE BLOOD TEST: CPT

## 2021-11-27 PROCEDURE — 85025 COMPLETE CBC W/AUTO DIFF WBC: CPT

## 2021-11-27 PROCEDURE — 94760 N-INVAS EAR/PLS OXIMETRY 1: CPT

## 2021-11-27 PROCEDURE — 83880 ASSAY OF NATRIURETIC PEPTIDE: CPT

## 2021-11-27 PROCEDURE — 97116 GAIT TRAINING THERAPY: CPT

## 2021-11-27 PROCEDURE — 97162 PT EVAL MOD COMPLEX 30 MIN: CPT

## 2021-11-27 PROCEDURE — 82728 ASSAY OF FERRITIN: CPT

## 2021-11-27 RX ORDER — DEXAMETHASONE 6 MG/1
6 TABLET ORAL
Qty: 8 TABLET | Refills: 0 | Status: SHIPPED | OUTPATIENT
Start: 2021-11-27 | End: 2021-12-05 | Stop reason: ALTCHOICE

## 2021-11-27 RX ORDER — INSULIN GLARGINE 100 [IU]/ML
60 INJECTION, SOLUTION SUBCUTANEOUS 2 TIMES DAILY
Status: DISCONTINUED | OUTPATIENT
Start: 2021-11-28 | End: 2021-11-28

## 2021-11-27 RX ORDER — INSULIN LISPRO 100 [IU]/ML
8 INJECTION, SOLUTION INTRAVENOUS; SUBCUTANEOUS
Status: DISCONTINUED | OUTPATIENT
Start: 2021-11-27 | End: 2021-11-28

## 2021-11-27 RX ORDER — ZINC SULFATE 50(220)MG
1 CAPSULE ORAL DAILY
Qty: 10 CAPSULE | Refills: 0 | Status: SHIPPED | OUTPATIENT
Start: 2021-11-27 | End: 2021-12-05 | Stop reason: ALTCHOICE

## 2021-11-27 RX ORDER — INSULIN GLARGINE 100 [IU]/ML
80 INJECTION, SOLUTION SUBCUTANEOUS 2 TIMES DAILY
Status: DISCONTINUED | OUTPATIENT
Start: 2021-11-27 | End: 2021-11-27

## 2021-11-27 RX ORDER — INSULIN GLARGINE 100 [IU]/ML
80 INJECTION, SOLUTION SUBCUTANEOUS
Status: COMPLETED | OUTPATIENT
Start: 2021-11-27 | End: 2021-11-27

## 2021-11-27 RX ORDER — LEVOFLOXACIN 750 MG/1
750 TABLET ORAL DAILY
Qty: 4 TABLET | Refills: 0 | Status: SHIPPED | OUTPATIENT
Start: 2021-11-27 | End: 2021-11-27

## 2021-11-27 RX ORDER — ASCORBIC ACID 500 MG
500 TABLET ORAL 2 TIMES DAILY
Qty: 20 TABLET | Refills: 0 | Status: SHIPPED | OUTPATIENT
Start: 2021-11-27 | End: 2021-12-05 | Stop reason: ALTCHOICE

## 2021-11-27 RX ORDER — LEVOFLOXACIN 750 MG/1
750 TABLET ORAL DAILY
Qty: 13 TABLET | Refills: 0 | Status: SHIPPED | OUTPATIENT
Start: 2021-11-27 | End: 2021-11-27

## 2021-11-27 RX ORDER — FUROSEMIDE 10 MG/ML
20 INJECTION INTRAMUSCULAR; INTRAVENOUS ONCE
Status: COMPLETED | OUTPATIENT
Start: 2021-11-27 | End: 2021-11-27

## 2021-11-27 RX ORDER — INSULIN GLARGINE 100 [IU]/ML
60 INJECTION, SOLUTION SUBCUTANEOUS 2 TIMES DAILY
Status: DISCONTINUED | OUTPATIENT
Start: 2021-11-27 | End: 2021-11-27

## 2021-11-27 RX ORDER — INSULIN LISPRO 100 [IU]/ML
8 INJECTION, SOLUTION INTRAVENOUS; SUBCUTANEOUS ONCE
Status: COMPLETED | OUTPATIENT
Start: 2021-11-27 | End: 2021-11-27

## 2021-11-27 RX ADMIN — INSULIN LISPRO 8 UNITS: 100 INJECTION, SOLUTION INTRAVENOUS; SUBCUTANEOUS at 17:24

## 2021-11-27 RX ADMIN — FUROSEMIDE 20 MG: 10 INJECTION, SOLUTION INTRAMUSCULAR; INTRAVENOUS at 18:16

## 2021-11-27 RX ADMIN — Medication 10 ML: at 00:08

## 2021-11-27 RX ADMIN — TAMSULOSIN HYDROCHLORIDE 0.4 MG: 0.4 CAPSULE ORAL at 09:02

## 2021-11-27 RX ADMIN — FAMOTIDINE 20 MG: 10 INJECTION, SOLUTION INTRAVENOUS at 09:02

## 2021-11-27 RX ADMIN — OXYCODONE HYDROCHLORIDE AND ACETAMINOPHEN 500 MG: 500 TABLET ORAL at 17:23

## 2021-11-27 RX ADMIN — INSULIN LISPRO 5 UNITS: 100 INJECTION, SOLUTION INTRAVENOUS; SUBCUTANEOUS at 00:08

## 2021-11-27 RX ADMIN — INSULIN LISPRO 7 UNITS: 100 INJECTION, SOLUTION INTRAVENOUS; SUBCUTANEOUS at 09:01

## 2021-11-27 RX ADMIN — Medication 10 ML: at 20:09

## 2021-11-27 RX ADMIN — METOPROLOL SUCCINATE 50 MG: 50 TABLET, EXTENDED RELEASE ORAL at 09:02

## 2021-11-27 RX ADMIN — DEXAMETHASONE 6 MG: 6 TABLET ORAL at 09:02

## 2021-11-27 RX ADMIN — SODIUM CHLORIDE 1000 ML: 9 INJECTION, SOLUTION INTRAVENOUS at 09:04

## 2021-11-27 RX ADMIN — INSULIN LISPRO 8 UNITS: 100 INJECTION, SOLUTION INTRAVENOUS; SUBCUTANEOUS at 12:51

## 2021-11-27 RX ADMIN — Medication 10 ML: at 14:00

## 2021-11-27 RX ADMIN — APIXABAN 5 MG: 5 TABLET, FILM COATED ORAL at 09:02

## 2021-11-27 RX ADMIN — APIXABAN 5 MG: 5 TABLET, FILM COATED ORAL at 17:23

## 2021-11-27 RX ADMIN — INSULIN LISPRO 10 UNITS: 100 INJECTION, SOLUTION INTRAVENOUS; SUBCUTANEOUS at 11:56

## 2021-11-27 RX ADMIN — INSULIN LISPRO 10 UNITS: 100 INJECTION, SOLUTION INTRAVENOUS; SUBCUTANEOUS at 18:33

## 2021-11-27 RX ADMIN — FAMOTIDINE 20 MG: 10 INJECTION, SOLUTION INTRAVENOUS at 20:08

## 2021-11-27 RX ADMIN — INSULIN GLARGINE 80 UNITS: 100 INJECTION, SOLUTION SUBCUTANEOUS at 00:09

## 2021-11-27 RX ADMIN — Medication 1 CAPSULE: at 09:02

## 2021-11-27 RX ADMIN — INSULIN LISPRO 8 UNITS: 100 INJECTION, SOLUTION INTRAVENOUS; SUBCUTANEOUS at 21:58

## 2021-11-27 RX ADMIN — INSULIN GLARGINE 80 UNITS: 100 INJECTION, SOLUTION SUBCUTANEOUS at 18:53

## 2021-11-27 RX ADMIN — HYDROCHLOROTHIAZIDE 25 MG: 25 TABLET ORAL at 09:02

## 2021-11-27 RX ADMIN — OXYCODONE HYDROCHLORIDE AND ACETAMINOPHEN 500 MG: 500 TABLET ORAL at 09:02

## 2021-11-27 RX ADMIN — ATORVASTATIN CALCIUM 40 MG: 20 TABLET, FILM COATED ORAL at 09:02

## 2021-11-27 RX ADMIN — INSULIN LISPRO 15 UNITS: 100 INJECTION, SOLUTION INTRAVENOUS; SUBCUTANEOUS at 17:23

## 2021-11-27 RX ADMIN — DILTIAZEM HYDROCHLORIDE 240 MG: 240 CAPSULE, COATED, EXTENDED RELEASE ORAL at 09:02

## 2021-11-27 NOTE — PROGRESS NOTES
11/27/2021  Reason for Admission:  Covid-19                   RUR Score:   8% green/low risk for readmission             Plan for utilizing home health:      Unlikely to need, has PT/OT consults    PCP: First and Last name:  Pola Esquivel DO   Name of Practice:    Are you a current patient: Yes/No:  Yes   Approximate date of last visit: 2-3 months ago   Can you participate in a virtual visit with your PCP: Yes                    Current Advanced Directive/Advance Care Plan: Partial Code      Healthcare Decision Maker:   Click here to complete [address] including selection of the Healthcare Decision Maker Relationship (ie \"Primary\")           Patient's wife Lulu Hagen is next of kin. Transition of Care Plan:                    Spoke with patient via phone for assessment due to covid+ status. Patient lives with his wife in a single story ranch style home with 3 steps to enter. Prior to admission he is independent with ADLs and drives. He does not report any use of DME and has no history of home health. Patient's emergency contact and next of kin is his wife Lulu Hagen who can be reached at [phone]. She will be taking him home from the hospital when he is ready for discharge. Patient sees Dr. Sierra Mcfadden for primary care and last had an appointment approximately 2-3 months ago. Patient uses [facility] on [address] [place] [address] for prescriptions and they are typically covered by insurance when needed. Patient does not report any questions or concerns about discharge at this time. Spoke with [facility] and he gave the ok to provide patient with home O2. Left outside patient's room, RN aware to take in. Transition of Care Plan   1. Continue medical management/treatment  2. Home with family assistance unless otherwise indicated  3. Home O2 provided   4. Follow up with PCP, specialties as needed  5. CM will continue to follow until discharge.      Care Management Interventions  PCP Verified by CM: Yes (Dr. Maurizio Heller)  Mode of Transport at Discharge:  Other (see comment) (wife)  Transition of Care Consult (CM Consult): Discharge Planning  Discharge Durable Medical Equipment: Yes  Physical Therapy Consult: Yes  Occupational Therapy Consult: Yes  Support Systems: Spouse/Significant Other  Discharge Location  Discharge Placement: Home with family assistance    KINGS Castaneda

## 2021-11-27 NOTE — PROGRESS NOTES
Bedside and Verbal shift change report given to [facility], RN (oncoming nurse) by Elvie Guerin RN (offgoing nurse). Report included the following information SBAR, Kardex, Intake/Output, MAR, Accordion and Recent Results.

## 2021-11-27 NOTE — PROGRESS NOTES
TRANSFER - OUT REPORT:    Verbal report given to MADELEINE Heard(name) on Stephanie Barrowp  being transferred to 528(unit) for routine progression of care       Report consisted of patients Situation, Background, Assessment and   Recommendations(SBAR). Information from the following report(s) SBAR, Kardex and ED Summary was reviewed with the receiving nurse. Lines:   PICC Single Lumen 87/07/80 Right;Cephalic (Active)       Peripheral IV 11/26/21 Right Antecubital (Active)   Site Assessment Clean, dry, & intact 11/26/21 1533   Phlebitis Assessment 0 11/26/21 1533   Infiltration Assessment 0 11/26/21 1533   Dressing Status Clean, dry, & intact 11/26/21 1533   Dressing Type Transparent 11/26/21 1533   Hub Color/Line Status Pink 11/26/21 1533        Opportunity for questions and clarification was provided.       Patient transported with:   O2 @ 2 liters  Tech

## 2021-11-27 NOTE — PROGRESS NOTES
Problem: Mobility Impaired (Adult and Pediatric)  Goal: *Acute Goals and Plan of Care (Insert Text)  Description: FUNCTIONAL STATUS PRIOR TO ADMISSION: Patient was independent and active without use of DME.    HOME SUPPORT PRIOR TO ADMISSION: The patient lived with wife but did not require assist.    Physical Therapy Goals  Initiated 11/27/2021  1. Patient will move from supine to sit and sit to supine  in bed with independence within 7 day(s). 2.  Patient will transfer from bed to chair and chair to bed with independence using the least restrictive device within 7 day(s). 3.  Patient will perform sit to stand with independence within 7 day(s). 4.  Patient will ambulate with independence for 150 feet with the least restrictive device within 7 day(s). Outcome: Progressing Towards Goal      PHYSICAL THERAPY EVALUATION  Patient: Alejandro Travis (51 y.o. male)  Date: 11/27/2021  Primary Diagnosis: Acute hypoxemic respiratory failure due [facility] [U07.1, J96.01]        Precautions:   Contact (COVID; Dropet plus)    ASSESSMENT  Based on the objective data described below, the patient presents with impaired activity tolerance, higher level balance, mobility and need for supplemental O2 impacting his ability to complete functional mobility  s/p admission for COVID 19. Medical hx HTN, DM2, chronic lymphedema with garments, RLE DVT and HLD. Nursing cleared for therapy. Received resting at EOB leaning on elevated HOB on 3.5 L O2 with SpO2 88-89%. Pt stating slight SOB, fatigue and productive coug. Donned slip on shoes at mod indep and sit <> stand with CGA. Increased to 4L for activity due to continued O2sats in high 80's. Pt ambulated 30' around bed with CGA. SpO2 at 85-86% with seated rest with cues for PLB. Need for increased to 5L with SpO2 returning to 92%. HR stable. Left on 5L stating that he felt much better with his breathing.   Pt educated with incentive spirometer use and its importance to wean from supp O2. Current Level of Function Impacting Discharge (mobility/balance): CGA to SBA/good    Functional Outcome Measure: The patient scored 60/100 on the barthel outcome measure   Other factors to consider for discharge: per above and below     Patient will benefit from skilled therapy intervention to address the above noted impairments. PLAN :  Recommendations and Planned Interventions: bed mobility training, transfer training, gait training, therapeutic exercises, neuromuscular re-education, edema management/control, patient and family training/education, and therapeutic activities      Frequency/Duration: Patient will be followed by physical therapy:  5 times a week to address goals. Recommendation for discharge: (in order for the patient to meet his/her long term goals)  Physical therapy at least 2 days/week in the home     This discharge recommendation:  Has not yet been discussed the attending provider and/or case management    IF patient discharges home will need the following DME: none         SUBJECTIVE:   Patient stated I am SOB.     OBJECTIVE DATA SUMMARY:   HISTORY:    Past Medical History:   Diagnosis Date    AR (allergic rhinitis) 6/11/2009    Diabetes ([facility]     Dr. Karuna Qureshi    History of vascular access device 08/18/2020    4f single picc placed on r cephalic, 85HY at 3cm out placed by Sherri Merritt, difficult limited access    Hypercholesterolemia     Hypertension      Past Surgical History:   Procedure Laterality Date    HX HEART CATHETERIZATION  2007    normal       Personal factors and/or comorbidities impacting plan of care: per above and below    Home Situation  Home Environment: Private residence  # Steps to Enter: 3  Rails to Enter: Yes  Hand Rails : Left  One/Two Story Residence: One story  Living Alone: No  Support Systems: Spouse/Significant Other  Patient Expects to be Discharged to[de-identified] House  Current DME Used/Available at Home: Compression garments, Raised toilet seat  Tub or Shower Type: Shower    EXAMINATION/PRESENTATION/DECISION MAKING:   Critical Behavior:  Neurologic State: Alert  Orientation Level: Oriented X4        Hearing: Auditory  Auditory Impairment: None  Skin:  IV  Edema: BLE  Range Of Motion:  AROM: Generally decreased, functional                       Strength:    Strength: Generally decreased, functional                    Tone & Sensation:   Tone: Normal                              Coordination:  Coordination: Within functional limits  Vision:      Functional Mobility:  Bed Mobility:  Rolling:  (received partial sitting)  Supine to Sit: Contact guard assistance (bed rails)        Transfers:  Sit to Stand: Contact guard assistance  Stand to Sit: Contact guard assistance        Bed to Chair: Contact guard assistance              Balance:   Sitting: Intact  Standing: Impaired  Ambulation/Gait Training:  Distance (ft): 30 Feet (ft)  Assistive Device: Gait belt (5LO2 needed)  Ambulation - Level of Assistance: Contact guard assistance        Gait Abnormalities: Trunk sway increased        Base of Support: Widened        Step Length: Left shortened; Right shortened                           Therapeutic Exercises:   Incentive spirometry    Functional Measure:  Barthel Index:    Bathin  Bladder: 10  Bowels: 10  Groomin  Dressin  Feeding: 10  Mobility: 0  Stairs: 0  Toilet Use: 10  Transfer (Bed to Chair and Back): 10  Total: 60/100       The Barthel ADL Index: Guidelines  1. The index should be used as a record of what a patient does, not as a record of what a patient could do. 2. The main aim is to establish degree of independence from any help, physical or verbal, however minor and for whatever reason. 3. The need for supervision renders the patient not independent. 4. A patient's performance should be established using the best available evidence.  Asking the patient, friends/relatives and nurses are the usual sources, but direct observation and common sense are also important. However direct testing is not needed. 5. Usually the patient's performance over the preceding 24-48 hours is important, but occasionally longer periods will be relevant. 6. Middle categories imply that the patient supplies over 50 per cent of the effort. 7. Use of aids to be independent is allowed. Score Interpretation (from [facility])    Independent   60-79 Minimally independent   40-59 Partially dependent   20-39 Very dependent   <20 Totally dependent     -Jeanne Lucero., Barthel, DMichelleW. (1965). Functional evaluation: the Barthel Index. [address] ([address] [place] [address] (1997). The Barthel activities of daily living index: self-reporting versus actual performance in the old (> or = 75 years). Journal [address] 45(7), 14 [facility], [place], [place]., [facility]. (1999). Measuring the change in disability after inpatient rehabilitation; comparison of the responsiveness of the Barthel Index and Functional [place] Measure. Journal of Neurology, Neurosurgery, and Psychiatry, 66(4), 230-038. Sherrie Harmon, N.J.A, NANCIE Grijalva, & Maria Guadalupe Adams, M.A. (2004) Assessment of post-stroke quality of life in cost-effectiveness studies: The usefulness of the Barthel Index and the EuroQoL-5D.  Quality of Life Research, 15, 791-09           Physical Therapy Evaluation Charge Determination   History Examination Presentation Decision-Making   MEDIUM  Complexity : 1-2 comorbidities / personal factors will impact the outcome/ POC  MEDIUM Complexity : 3 Standardized tests and measures addressing body structure, function, activity limitation and / or participation in recreation  MEDIUM Complexity : Evolving with changing characteristics  Other outcome measures barthel  MEDIUM      Based on the above components, the patient evaluation is determined to be of the following complexity level: MEDIUM    Pain Rating:  none    Activity Tolerance:   Fair    After treatment patient left in no apparent distress:   Sitting in chair and Call bell within reach    COMMUNICATION/EDUCATION:   The patients plan of care was discussed with: Occupational therapist and Registered nurse. Fall prevention education was provided and the patient/caregiver indicated understanding., Patient/family have participated as able in goal setting and plan of care. , and Patient/family agree to work toward stated goals and plan of care.     Thank you for this referral.  Ariane Thayer, PT   Time Calculation: 23 mins

## 2021-11-27 NOTE — PROGRESS NOTES
[address] [facility] [address] [place] [address]   Office [phone]  Fax [phone]          Assessment and Plan     Gwyn Mclean is a 64 y.o. male with a PMH of HTN, DM2, HLD, chronic lymphedema, history of right femoral DVT in 2020 now on [facility] admitted for acute hypoxic respiratory failure 2/2 COVID PNA and lactic acidosis. Patient was admitted on 11/26/2021.    24-Hour Events: Blood glucoses ranged 344-444 yesterday. Pt received Lantus 80 units and 71 units of Lispro. Pt w/ rapidly increasing O2 requirement. . Procalcitonin <0.05. CXR demonstrates worsening airspace disease in bilateral lower lungs w/ new small R pleural effusion. Acute hypoxic respiratory failure 2/2 COVID pneumonia: Pt with O2 sat of 88% at time of admission. Symptom onset 11/18. Presenting symptoms acutely worsened on date of admission, prompting presentation to the ED. In the ED, given Decadron IV 10mg, 1L NS, with symptomatic improvement. Pt met 1/4 SIRS criteria on admission, and CURB-65 1/5. Pt currently requiring 45 L HFNC.  - CTA chest to evaluate for PE given rapidly increasing O2 requirement. - Continue supplemental O2, wean as tolerated  - RT consulted to determine home O2 needs  - PT/OT consulted  - Decadron 10 mg BID  - Start Baricitinib  - Pulmonology following, appreciate further recommendations  - COVID meds (Atorvastatin 40mg every day, Vitamin C 500mg BID, and Zinc 220mg every day)  - CBC with diff daily, COVID labs (CRP, D-dimer, ferritin, LD) daily     Lactic acidosis: POA 3.0 -> 2.4 -> 2.2 -> 2.5 -> 3.0 -> 3.2 -> 2.8 -> 2.9 -> 2.1. Concern for PE given, O2 requirement. - Obtain CTA chest.  - Will continue to trend lactate q4h until downtrended  - BMP daily  - Encourage PO intake  - Holding IVF for now    T2DM: Last A1c 7.9% in 09/2020. On Lantus 100u QHS, SSI Lispro, Metformin 1000mg BID at home. Will switch to 80% of home dose while admitted, likely hyperglycemia as pt on only 80% of home dose here.   - Lantus 60u BID   - Lispro 8 units TID w/ meals  - SSI  - Consider insulin gtt  - POC BG ACHS  - Hypoglycemia protocols ordered     History of DVT: On Eliquis at home. F/b Dr. Iona Dominguez. Unclear etiology - not provoked per Dr. Kamari Silva note. Denies fh/o VTE that pt is aware of. Per chart review, pt started on Xarelto, however pt reportedly taking Eliquis per medication reconciliation with wife over phone.  - Continue Eliquis 5mg BID  - Up and out of bed as tolerated     Troponinemia (resolved): Troponin of 15 -> 22 -> 15. Pt without chest pain or tightness. Low concern for ACS. Per chart review, last [facility] 2007 was wnl.  - Will continue to monitor symptoms, electrolytes  - Patient on remote cardiac monitoring     Dysuria: Noted per pt. Likely 2/2 decreased UOP in setting of poor PO intake. UA now without evidence of infection.  - Continue to monitor VS, labs, and symptoms  - Bladder checks and straight cath PRN     Hypokalemia: POA 3.1. S/p 40mEq PO in ED.  - BMP and Mg daily - pending this AM  - Replete as needed  - Patient on remote tele     HLD: Last lipid panel 08/2020 with , HDL 43, LDL 99, . On Rosuvastatin 20mg daily at home. - Will substitute with Atorvastatin 40mg daily here     HTN: POA /68. On Metoprolol XL 50mg daily, Diltiazem 240mg daily, and HCTZ 25mg daily. - Will continue BP meds  - Monitor and adjust as required     Chronic lymphedema: Chronic venous stasis and 3+ pitting edema noted on exam. Pt states he uses compression stockings at home. - Compression stockings ordered  - Elevate lower extremities     BPH: Chronic, stable. - Continue Alfuzosin 10mg daily - pharm to substitute     Obesity:  - The pt's Body mass index is 48.15 kg/m². - Encouraging lifestyle modifications and further follow up outpatient. FEN/GI - Diabetic diet.   Activity - Out of bed with assistance  DVT prophylaxis - Eliquis  GI prophylaxis - Pepcid 20 BID  Fall prophylaxis - Fall precautions ordered. Disposition - Admit to Remote Telemetry. PT recommends 2 days/week home PT  Code Status - Partial (DNI). Discussed with patient / caregivers. Next of Katarina 69 Name and Krish Schroeder 23 Gerry Les - [facility] [address], MD  Family Medicine Resident    [address] Medicine Residency Attending Addendum:  I saw and evaluated the patient, performing the key elements of the service. I discussed the findings, assessment and plan with the resident and agree with the resident's findings and plan as documented in the resident's note. Acute worsening requiring increase to HFNC. Increased dexamethasone to 10mg BID. High risk of decompensation. Appreciate pulmonology recommendations. The patient was seen on 11/28/2021    Electronic Signature:  Eileen Singh MD  11/28/2021 4:45 PM           Subjective / Objective     Subjective   Pt reports coughing spells overnight. He states that his SOB is unchanged. He denies chest pain, abdominal pain, nausea, vomiting, or dizziness. Respiratory: O2 Flow Rate (L/min): 45 l/min O2 Device: Hi flow nasal cannula, Humidifier   Visit Vitals  /64   Pulse 76   Temp 97.9 °F (36.6 °C)   Resp 30   Ht 6' 2\" (1.88 m)   Wt (!) 358 lb 7.5 oz (162.6 kg)   SpO2 (!) 87%   BMI 46.02 kg/m²       General: No acute distress. Alert. Cooperative. Obese male positioned comfortably in bed  Respiratory: Scattered crackles bilaterally. Cardiovascular: Regular rate and rhythm. GI: + bowel sounds. Nontender. No rebound tenderness or guarding. Protuberant abdomen  Extremities:  3+ pitting LE edema bilateral lower extremities w/ chronic venous stasis changes. Skin: Warm, dry. Neuro: Awake, alert, and appropriately conversant. I/O:  Date 11/27/21 0700 - 11/28/21 0659 11/28/21 0700 - 11/29/21 0659   Shift [id number] 1900-0659 24 Hour Total 6474-2062 0708-4860 24 Hour Total   INTAKE   P.O. 240  240        P. O. 240  240      I. V.(mL/kg/hr) 965(0.5)  965(0.2)        Volume (0.9% sodium chloride infusion) 815  815        Volume (levoFLOXacin (LEVAQUIN) 750 mg in D5W IVPB) 150  150      Shift Total(mL/kg) 1205(7.4)  1205(7.4)      OUTPUT   Urine(mL/kg/hr) 500(0.3) 300(0.2) 800(0.2)        Urine Voided 500 300 800        Urine Occurrence(s) 3 x 1 x 4 x      Emesis/NG output           Emesis Occurrence(s)  0 x 0 x      Stool           Stool Occurrence(s) 1 x 0 x 1 x      Shift Total(mL/kg) 500(3.1) 300(1.8) 800(4.9)       -300 405      Weight (kg) 161.8 162.6 162.6 162.6 162.6 162.6       CBC:  Recent Labs     11/28/21  0035 11/27/21  0940 11/26/21  1534   WBC 7.9 4.6 4.6   HGB 12.9 12.3 13.5   HCT 38.5 37.0 40.4    200 916       Metabolic Panel:  Recent Labs     11/28/21  0035 11/27/21  0940 11/26/21  1534   * 133* 134*   K 3.4* 3.8 3.1*    100 97   CO2 23 24 30   BUN 22* 19 20   CREA 1.21 1.16 1.23   * 395* 201*   CA 8.4* 8.0* 8.5   MG 1.9 1.8 1.9   ALB  --   --  3.0*   ALT  --   --  42          For Billing    Chief Complaint   Patient presents with   [address] [place] [address] Problems  Date Reviewed: 8/6/2021          Codes Class Noted POA    Lymphedema ICD-10-CM: I89.0  ICD-9-CM: 457.1  11/27/2021 Unknown        BPH (benign prostatic hyperplasia) ICD-10-CM: N40.0  ICD-9-CM: 600.00  11/27/2021 Unknown        Elevated troponin ICD-10-CM: R77.8  ICD-9-CM: 790.6  11/27/2021 Unknown        History of DVT of lower extremity ICD-10-CM: T66.418  ICD-9-CM: V12.51  11/27/2021 Unknown        * (Principal) Acute hypoxemic respiratory failure due to COVID-19 [facility]) ICD-10-CM: U07.1, J96.01  ICD-9-CM: 518.81, 079.89, 799.02  11/26/2021 Unknown        Lactic acidosis ICD-10-CM: E87.2  ICD-9-CM: 276.2  8/15/2020 Yes        Hyperlipemia (Chronic) ICD-10-CM: E78.5  ICD-9-CM: 272.4  3/12/2009 Yes        HTN (hypertension) (Chronic) ICD-10-CM: I10  ICD-9-CM: 401.9  3/12/2009 Yes        DM (diabetes mellitus) (HCC) (Chronic) ICD-10-CM: E11.9  ICD-9-CM: 250.00 3/12/1999 Yes

## 2021-11-27 NOTE — PROGRESS NOTES
[address] [facility] [address] [place] [address]   Office [phone]  Fax [phone]          Assessment and Plan     Savage Ramírez is a 64 y.o. male with a PMH of HTN, DM2, HLD, chronic lymphedema, history of right femoral DVT in 2020 now on [facility] admitted for acute hypoxic respiratory failure 2/2 COVID PNA and lactic acidosis. Patient was admitted on 11/26/2021.    24 Hour Events: No acute events overnight. Acute hypoxic respiratory failure 2/2 COVID pneumonia: Pt with O2 sat of 88% at time of admission. Symptom onset 11/18. Presenting symptoms acutely worsened on date of admission, prompting presentation to the ED. In the ED, given Decadron IV 10mg, 1L NS, with symptomatic improvement. Pt met 1/4 SIRS criteria on admission, and CURB-65 1/5.  - Continue supplemental O2, wean as tolerated  - RT consulted to determine home O2 needs  - PT/OT consulted  - S/p IV Decadron 10mg in ED, will continue Decadron 6mg PO daily x 10 days total  - COVID meds (Atorvastatin 40mg every day, Vitamin C 500mg BID, and Zinc 220mg every day)  - CBC with diff daily, COVID labs (CRP, D-dimer, ferritin, LD) daily     Lactic acidosis: POA 3.0 -> 2.4 -> 2.2. Likely in the setting of poor PO intake prior to admission, as well as underlying OHS/AIDEE (likely, but has not been diagnosed at this point)  - Will continue to trend lactate q4h until downtrended  - BMP daily  - Encourage PO intake  - Continue mIVF at 100cc/hr     History of DVT: On Eliquis at home. F/b Dr. Estela Solo. Unclear etiology - not provoked per Dr. Richie Lundborg note. Denies fh/o VTE that pt is aware of. Per chart review, pt started on Xarelto, however pt reportedly taking Eliquis per medication reconciliation with wife over phone.  - Continue Eliquis 5mg BID  - Up and out of bed as tolerated     Troponinemia: Troponin of 15 -> 22. Pt without chest pain or tightness. Low concern for ACS.  Per chart review, last [facility] 2007 was wnl.  - Will continue to monitor symptoms, electrolytes  - Patient on remote cardiac monitoring  - Repeat troponin ordered     Dysuria: Noted per pt. Likely 2/2 decreased UOP in setting of poor PO intake. Will initiate tx empirically for complicated UTI. UCx in past without growth - unable to determine past bacterial species pt is prone to infection from. UA now without evidence of infection, not acutely septic. In absence of systemic symptoms or evidence of leukocytosis, likely due to decreased UOP and overall feeling ill from COVID, will consider scaling back Levaquin prior to d/c.  - Continue Levaquin 750mg IV every day and re-eval prior to d/c today  - Bladder checks and straight cath PRN     Hypokalemia: POA 3.1. S/p 40mEq PO in ED.  - BMP and Mg daily - pending this AM  - Replete as needed  - Patient on remote tele     HLD: Last lipid panel 08/2020 with , HDL 43, LDL 99, . On Rosuvastatin 20mg daily at home. - Will substitute with Atorvastatin 40mg daily here     T2DM: Last A1c 7.9% in 09/2020. On Lantus 100u QHS, SSI Lispro, Metformin 1000mg BID at home. Will switch to 80% of home dose while admitted, likely hyperglycemia as pt on only 80% of home dose here. - Lantus 80u QHS   - SSI  - POC BG ACHS  - Hypoglycemia protocols ordered     HTN: POA /68. On Metoprolol XL 50mg daily, Diltiazem 240mg daily, and HCTZ 25mg daily. - Will continue BP meds  - Monitor and adjust as required     Chronic lymphedema: Chronic venous stasis and 3+ pitting edema noted on exam. Pt states he uses compression stockings at home. - Compression stockings ordered  - Elevate lower extremities     BPH: Chronic, stable. - Continue Alfuzosin 10mg daily - pharm to substitute     Obesity:  - The pt's Body mass index is 48.15 kg/m². - Encouraging lifestyle modifications and further follow up outpatient. Aleks Spence MD  Family Medicine Resident         Subjective / Objective     Subjective  Patient states he feels better today - breathing improved.  Denies chest pain, palp. No abdominal pain, n/v. Continues to endorse watery diarrhea. Respiratory: O2 Flow Rate (L/min): 2 l/min O2 Device: Nasal cannula   Visit Vitals  BP (!) 161/79 (BP 1 Location: Left upper arm, BP Patient Position: At rest)   Pulse 72   Temp 98 °F (36.7 °C)   Resp 18   Ht 6' 2\" (1.88 m)   Wt (!) 356 lb 11.3 oz (161.8 kg)   SpO2 90%   BMI 45.80 kg/m²       General: No acute distress. Alert. Cooperative. Obese male positioned comfortably in bed  Head: Normocephalic. Atraumatic. Respiratory: CTAB. No w/r/r/c. On 2L O2 - oxygenating b/w 91-93%. Cardiovascular: RRR. Normal S1,S2. No m/r/g.   GI: + bowel sounds. Nontender. No rebound tenderness or guarding. Protuberant abdomen  Extremities:  3+ pitting LE edema bilateral lower extremities with chronic venous stasis changes. Skin: Warm, dry. No rashes. Neuro: Alert and oriented to conversation.     I/O:  Date 11/26/21 0700 - 11/27/21 0659 11/27/21 0700 - 11/28/21 0659   Shift 8612-6645 9551-2861 24 Hour Total 5390-8569 5977-8476 24 Hour Total   INTAKE   I.V.(mL/kg/hr)  0(0) 0(0) 965  965     Volume (sodium chloride 0.9 % bolus infusion 1,000 mL)  0 0        Volume (0.9% sodium chloride infusion)  0 0 815  815     Volume (levoFLOXacin (LEVAQUIN) 750 mg in D5W IVPB)    150  150   Shift Total(mL/kg)  0(0) 0(0) 965(6)  965(6)   OUTPUT   Urine(mL/kg/hr)  700(0.4) 700(0.2)        Urine Voided  700 700        Urine Occurrence(s)    2 x  2 x   Stool           Stool Occurrence(s)    1 x  1 x   Shift Total(mL/kg)  700(4.3) 700(4.3)      NET  -700 -700 965  965   Weight (kg) 170.1 161.8 161.8 161.8 161.8 161.8       CBC:  Recent Labs     11/26/21  1534   WBC 4.6   HGB 13.5   HCT 40.4          Metabolic Panel:  Recent Labs     11/26/21  1534   *   K 3.1*   CL 97   CO2 30   BUN 20   CREA 1.23   *   CA 8.5   MG 1.9   ALB 3.0*   ALT 42          For Billing    Chief Complaint   Patient presents with   [address] [facility] Problems  Date Reviewed: 8/6/2021          Codes Class Noted POA    Acute hypoxemic respiratory failure due to COVID-19 [facility]) ICD-10-CM: U07.1, J96.01  ICD-9-CM: 518.81, 079.89, 799.02  11/26/2021 Unknown

## 2021-11-27 NOTE — PROGRESS NOTES
Problem: Self Care Deficits Care Plan (Adult)  Goal: *Acute Goals and Plan of Care (Insert Text)  Description: FUNCTIONAL STATUS PRIOR TO ADMISSION: Patient was independent and active without use of DME.     HOME SUPPORT: The patient lived with wife but did not require assist.    Occupational Therapy Goals  Initiated 11/27/2021  1. Patient will perform grooming standing with supervision/set-up within 7 day(s). 2.  Patient will perform bathing with supervision/set-up within 7 day(s). 3.  Patient will perform lower body dressing with supervision/set-up within 7 day(s). 4.  Patient will perform toilet transfers with supervision/set-up within 7 day(s). 5.  Patient will participate in upper extremity therapeutic exercise/activities with supervision/set-up for 5 minutes within 7 day(s). 6.  Patient will utilize energy conservation techniques during functional activities with verbal cues within 7 day(s). Outcome: Progressing Towards Goal   OCCUPATIONAL THERAPY EVALUATION  Patient: Aston Carvajal (81 y.o. male)  Date: 11/27/2021  Primary Diagnosis: Acute hypoxemic respiratory failure due to COVID-19 ([facility] [U07.1, J96.01]        Precautions:   Contact (COVID; Dropet plus)    ASSESSMENT  Based on the objective data described below, the patient presents with impaired activity tolerance, higher level balance, mobility and need for supplemental O2 impacting his ability to complete ADL tasks s/p admission for COVID 19. Medical hx HTN, DM2, chronic lymphedema with garments, RLE DVT and HLD. Nursing cleared for therapy. Received resting at EOB leaning on elevated HOB on 3.5 L O2 with SpO2 88-89%. Donned slip on shoes at mod indep and sit <> stand with CGA. Denies need for toileting. Increased to 4L for activity and ambulated around bed with CGA. SpO2 at 86% with seated rest with cues for PLB. Difficulty with increasing SpO2, O2 increased to 5L with SpO2 returning to 92%. HR stable.   Left on 5 L with good participation with incentive spirometer. Current Level of Function Impacting Discharge (ADLs/self-care): Overall CGA to supervision    Functional Outcome Measure: The patient scored 60/100 on the Barthel Index outcome measure which is indicative of mild impairment with ADL tasks. Other factors to consider for discharge: Patient with no O2 needs prior to admission. He required up to 5 L to recover from brief activity. Recommend continued skilled OT services while in hospital to maximize indep with plans for dc home with support from wife. Patient will benefit from skilled therapy intervention to address the above noted impairments. PLAN :  Recommendations and Planned Interventions: self care training, functional mobility training, therapeutic exercise, balance training, therapeutic activities, endurance activities, patient education, home safety training, and family training/education    Frequency/Duration: Patient will be followed by occupational therapy 3 times a week to address goals.     Recommendation for discharge: (in order for the patient to meet his/her long term goals)  To be determined: HH OT vs none pending progression in acute setting    This discharge recommendation:  Has not yet been discussed the attending provider and/or case management    IF patient discharges home will need the following DME: shower chair       SUBJECTIVE:   Patient stated .    OBJECTIVE DATA SUMMARY:   HISTORY:   Past Medical History:   Diagnosis Date    AR (allergic rhinitis) 6/11/2009    Diabetes ([address]     Dr. Tata Braswell    History of vascular access device 08/18/2020    4f single picc placed on r cephalic, 42NI at 3cm out placed by CARMEN Lowry, difficult limited access    Hypercholesterolemia     Hypertension      Past Surgical History:   Procedure Laterality Date    HX HEART CATHETERIZATION  2007    normal       Expanded or extensive additional review of patient history:     [address] [facility] Environment: Private residence  # Steps to Enter: 3  Rails to Enter: Yes  [employer] : Left  One/Two Story Residence: One story  Living Alone: No  Support Systems: Spouse/Significant Other  Patient Expects to be Discharged to[de-identified] House  Current DME Used/Available at Home: Compression garments, Raised toilet seat  Tub or Shower Type: Shower    EXAMINATION OF PERFORMANCE DEFICITS:  Cognitive/Behavioral Status:  Neurologic State: Alert  Orientation Level: Oriented X4      Hearing: Auditory  Auditory Impairment: None    Range of Motion:  AROM: Generally decreased, functional        Strength:  Strength: Generally decreased, functional     Coordination:  Coordination: Within functional limits  Fine Motor Skills-Upper: Left Intact; Right Intact    Gross Motor Skills-Upper: Left Intact; Right Intact    Tone & Sensation:  Tone: Normal         Balance:  Sitting: Intact  Standing: Impaired    Functional Mobility and Transfers for ADLs:  Bed Mobility:  Rolling:  (received partial sitting)  Supine to Sit: Contact guard assistance (bed rails)    Transfers:  Sit to Stand: Contact guard assistance  Stand to Sit: Contact guard assistance  Bed to Chair: Contact guard assistance    ADL Assessment:  Feeding: Independent  Oral Facial Hygiene/Grooming: Supervision; Setup (limited standing tolerance)  Bathing: Minimum assistance  Upper Body Dressing: Setup  Lower Body Dressing: Minimum assistance  Toileting: Contact guard assistance                ADL Intervention and task modifications:     Patient instructed and indicated understanding the benefits of maintaining activity tolerance, functional mobility, and independence with self care tasks during acute stay  to ensure safe return home and to baseline. Encouraged patient to increase frequency and duration OOB, be out of bed for all meals, perform daily ADLs (as approved by RN/MD regarding bathing etc), and performing functional mobility to/from bathroom.   Provided education on energy conservation techniques in regards to ADL/IADL tasks. Use of example of phone battery in regards to draining of all the energy and having to have extended time to recharge vs using small amounts of energy then less time required to recharge. Discussion with examples of pacing, planning, completion of heavy activity during strongest part of day, seated vs standing, and asking for assistance as needed. Patient with good participation in discussion and fair understanding. Ed increasing pulmonary function with focus on PLB, incentive spirometer, time OOB and posture, patient with good understanding and demo teach back with ability to use incentive spirometer. Lower Body Dressing Assistance  Slip on Shoes Without Back: Independent      Functional Measure:    Barthel Index:  Bathin  Bladder: 10  Bowels: 10  Groomin  Dressin  Feeding: 10  Mobility: 0  Stairs: 0  Toilet Use: 10  Transfer (Bed to Chair and Back): 10  Total: 60/100      The Barthel ADL Index: Guidelines  1. The index should be used as a record of what a patient does, not as a record of what a patient could do. 2. The main aim is to establish degree of independence from any help, physical or verbal, however minor and for whatever reason. 3. The need for supervision renders the patient not independent. 4. A patient's performance should be established using the best available evidence. Asking the patient, friends/relatives and nurses are the usual sources, but direct observation and common sense are also important. However direct testing is not needed. 5. Usually the patient's performance over the preceding 24-48 hours is important, but occasionally longer periods will be relevant. 6. Middle categories imply that the patient supplies over 50 per cent of the effort. 7. Use of aids to be independent is allowed.     Score Interpretation (from [facility])    Independent   60-79 Minimally independent   40-59 Partially dependent 20-39 Very dependent   <20 Totally dependent     -Laura Lucero, Barthel, D.W. (1988). Functional evaluation: the Barthel Index. [address] ([address] (1997). The Barthel activities of daily living index: self-reporting versus actual performance in the old (> or = 75 years). Journal of [address] [place] [address] 45(7), 14 [facility], J.LIZZYF, Mahsa Molina, Henry Pineda. (1999). Measuring the change in disability after inpatient rehabilitation; comparison of the responsiveness of the Barthel Index and Functional [place] Measure. Journal of Neurology, Neurosurgery, and Psychiatry, 66(4), 142-438. ABIMBOLA Campos Caro.BREE, NANCIE Grijalva, & Erica Bernstein MKEN (2004) Assessment of post-stroke quality of life in cost-effectiveness studies: The usefulness of the Barthel Index and the EuroQoL-5D. Quality of Life Research, 15, 210-57       Occupational Therapy Evaluation Charge Determination   History Examination Decision-Making   LOW Complexity : Brief history review  LOW Complexity : 1-3 performance deficits relating to physical, cognitive , or psychosocial skils that result in activity limitations and / or participation restrictions  LOW Complexity : No comorbidities that affect functional and no verbal or physical assistance needed to complete eval tasks       Based on the above components, the patient evaluation is determined to be of the following complexity level: LOW   Pain Rating:  No c.o pain    Activity Tolerance:   Poor, desaturates with exertion and requires oxygen, requires rest breaks, and observed SOB with activity    After treatment patient left in no apparent distress:    Sitting in chair and Call bell within reach    COMMUNICATION/EDUCATION:   The patients plan of care was discussed with: Physical therapist and Registered nurse. Home safety education was provided and the patient/caregiver indicated understanding.  and Patient/family agree to work toward stated goals and plan of care. This patients plan of care is appropriate for delegation to SYLVIA.     Thank you for this referral.  Dayo Estevez OT  Time Calculation: 22 mins

## 2021-11-27 NOTE — PROGRESS NOTES
11/27/2021  Case Management Note    8:45 AM  Attempted x2 to call into patient's room for initial assessment, patient did not answer. Noted consult for home O2--sent to [facility] and will follow up with a phone call.      KINGS Sharp

## 2021-11-28 ENCOUNTER — APPOINTMENT (OUTPATIENT)
Dept: NON INVASIVE DIAGNOSTICS | Age: 61
DRG: 177 | End: 2021-11-28
Attending: STUDENT IN AN ORGANIZED HEALTH CARE EDUCATION/TRAINING PROGRAM
Payer: COMMERCIAL

## 2021-11-28 ENCOUNTER — APPOINTMENT (OUTPATIENT)
Dept: CT IMAGING | Age: 61
DRG: 177 | End: 2021-11-28
Attending: STUDENT IN AN ORGANIZED HEALTH CARE EDUCATION/TRAINING PROGRAM
Payer: COMMERCIAL

## 2021-11-28 ENCOUNTER — APPOINTMENT (OUTPATIENT)
Dept: GENERAL RADIOLOGY | Age: 61
DRG: 177 | End: 2021-11-28
Attending: STUDENT IN AN ORGANIZED HEALTH CARE EDUCATION/TRAINING PROGRAM
Payer: COMMERCIAL

## 2021-11-28 LAB
ANION GAP SERPL CALC-SCNC: 10 MMOL/L (ref 5–15)
ARTERIAL PATENCY WRIST A: ABNORMAL
BASE DEFICIT BLDV-SCNC: 1.2 MMOL/L
BASOPHILS # BLD: 0 K/UL (ref 0–0.1)
BASOPHILS NFR BLD: 0 % (ref 0–1)
BNP SERPL-MCNC: 409 PG/ML
BUN SERPL-MCNC: 22 MG/DL (ref 6–20)
BUN/CREAT SERPL: 18 (ref 12–20)
CALCIUM SERPL-MCNC: 8.4 MG/DL (ref 8.5–10.1)
CHLORIDE SERPL-SCNC: 100 MMOL/L (ref 97–108)
CO2 SERPL-SCNC: 23 MMOL/L (ref 21–32)
COMMENT, HOLDF: NORMAL
COMMENT, HOLDF: NORMAL
CREAT SERPL-MCNC: 1.21 MG/DL (ref 0.7–1.3)
CRP SERPL-MCNC: 2.65 MG/DL (ref 0–0.6)
D DIMER PPP FEU-MCNC: 0.33 MG/L FEU (ref 0–0.65)
DIFFERENTIAL METHOD BLD: ABNORMAL
EOSINOPHIL # BLD: 0 K/UL (ref 0–0.4)
EOSINOPHIL NFR BLD: 0 % (ref 0–7)
ERYTHROCYTE [DISTWIDTH] IN BLOOD BY AUTOMATED COUNT: 15.1 % (ref 11.5–14.5)
EST. AVERAGE GLUCOSE BLD GHB EST-MCNC: 186 MG/DL
FERRITIN SERPL-MCNC: 395 NG/ML (ref 26–388)
GAS FLOW.O2 O2 DELIVERY SYS: ABNORMAL L/MIN
GLUCOSE BLD STRIP.AUTO-MCNC: 353 MG/DL (ref 65–117)
GLUCOSE BLD STRIP.AUTO-MCNC: 362 MG/DL (ref 65–117)
GLUCOSE BLD STRIP.AUTO-MCNC: 388 MG/DL (ref 65–117)
GLUCOSE BLD STRIP.AUTO-MCNC: 403 MG/DL (ref 65–117)
GLUCOSE BLD STRIP.AUTO-MCNC: 514 MG/DL (ref 65–117)
GLUCOSE SERPL-MCNC: 374 MG/DL (ref 65–100)
HBA1C MFR BLD: 8.1 % (ref 4–5.6)
HCO3 BLDV-SCNC: 22.1 MMOL/L (ref 23–28)
HCT VFR BLD AUTO: 38.5 % (ref 36.6–50.3)
HGB BLD-MCNC: 12.9 G/DL (ref 12.1–17)
IMM GRANULOCYTES # BLD AUTO: 0 K/UL (ref 0–0.04)
IMM GRANULOCYTES NFR BLD AUTO: 1 % (ref 0–0.5)
LACTATE SERPL-SCNC: 2.1 MMOL/L (ref 0.4–2)
LACTATE SERPL-SCNC: 2.8 MMOL/L (ref 0.4–2)
LACTATE SERPL-SCNC: 2.8 MMOL/L (ref 0.4–2)
LACTATE SERPL-SCNC: 2.9 MMOL/L (ref 0.4–2)
LACTATE SERPL-SCNC: 3.3 MMOL/L (ref 0.4–2)
LDH SERPL L TO P-CCNC: 357 U/L (ref 85–241)
LYMPHOCYTES # BLD: 0.8 K/UL (ref 0.8–3.5)
LYMPHOCYTES NFR BLD: 10 % (ref 12–49)
MAGNESIUM SERPL-MCNC: 1.9 MG/DL (ref 1.6–2.4)
MCH RBC QN AUTO: 29.6 PG (ref 26–34)
MCHC RBC AUTO-ENTMCNC: 33.5 G/DL (ref 30–36.5)
MCV RBC AUTO: 88.3 FL (ref 80–99)
MONOCYTES # BLD: 0.6 K/UL (ref 0–1)
MONOCYTES NFR BLD: 8 % (ref 5–13)
NEUTS SEG # BLD: 6.4 K/UL (ref 1.8–8)
NEUTS SEG NFR BLD: 81 % (ref 32–75)
NRBC # BLD: 0 K/UL (ref 0–0.01)
NRBC BLD-RTO: 0 PER 100 WBC
O2/TOTAL GAS SETTING VFR VENT: 15 %
PCO2 BLDV: 31.9 MMHG (ref 41–51)
PH BLDV: 7.45 [PH] (ref 7.32–7.42)
PLATELET # BLD AUTO: 249 K/UL (ref 150–400)
PMV BLD AUTO: 10.4 FL (ref 8.9–12.9)
PO2 BLDV: 53 MMHG (ref 25–40)
POTASSIUM SERPL-SCNC: 3.4 MMOL/L (ref 3.5–5.1)
PROCALCITONIN SERPL-MCNC: <0.05 NG/ML
RBC # BLD AUTO: 4.36 M/UL (ref 4.1–5.7)
SAMPLES BEING HELD,HOLD: NORMAL
SAMPLES BEING HELD,HOLD: NORMAL
SAO2 % BLDV: 88.7 % (ref 65–88)
SERVICE CMNT-IMP: ABNORMAL
SODIUM SERPL-SCNC: 133 MMOL/L (ref 136–145)
SPECIMEN TYPE: ABNORMAL
WBC # BLD AUTO: 7.9 K/UL (ref 4.1–11.1)

## 2021-11-28 PROCEDURE — 74011250636 HC RX REV CODE- 250/636: Performed by: STUDENT IN AN ORGANIZED HEALTH CARE EDUCATION/TRAINING PROGRAM

## 2021-11-28 PROCEDURE — 77010033711 HC HIGH FLOW OXYGEN

## 2021-11-28 PROCEDURE — 84145 PROCALCITONIN (PCT): CPT

## 2021-11-28 PROCEDURE — 86140 C-REACTIVE PROTEIN: CPT

## 2021-11-28 PROCEDURE — XW0DXM6 INTRODUCTION OF BARICITINIB INTO MOUTH AND PHARYNX, EXTERNAL APPROACH, NEW TECHNOLOGY GROUP 6: ICD-10-PCS | Performed by: FAMILY MEDICINE

## 2021-11-28 PROCEDURE — 74011636637 HC RX REV CODE- 636/637: Performed by: STUDENT IN AN ORGANIZED HEALTH CARE EDUCATION/TRAINING PROGRAM

## 2021-11-28 PROCEDURE — 74011250637 HC RX REV CODE- 250/637: Performed by: STUDENT IN AN ORGANIZED HEALTH CARE EDUCATION/TRAINING PROGRAM

## 2021-11-28 PROCEDURE — 82962 GLUCOSE BLOOD TEST: CPT

## 2021-11-28 PROCEDURE — 65660000000 HC RM CCU STEPDOWN

## 2021-11-28 PROCEDURE — 80048 BASIC METABOLIC PNL TOTAL CA: CPT

## 2021-11-28 PROCEDURE — 82728 ASSAY OF FERRITIN: CPT

## 2021-11-28 PROCEDURE — 83735 ASSAY OF MAGNESIUM: CPT

## 2021-11-28 PROCEDURE — 77010033678 HC OXYGEN DAILY

## 2021-11-28 PROCEDURE — 71045 X-RAY EXAM CHEST 1 VIEW: CPT

## 2021-11-28 PROCEDURE — 83036 HEMOGLOBIN GLYCOSYLATED A1C: CPT

## 2021-11-28 PROCEDURE — 94760 N-INVAS EAR/PLS OXIMETRY 1: CPT

## 2021-11-28 PROCEDURE — 85379 FIBRIN DEGRADATION QUANT: CPT

## 2021-11-28 PROCEDURE — 36415 COLL VENOUS BLD VENIPUNCTURE: CPT

## 2021-11-28 PROCEDURE — 82803 BLOOD GASES ANY COMBINATION: CPT

## 2021-11-28 PROCEDURE — 83605 ASSAY OF LACTIC ACID: CPT

## 2021-11-28 PROCEDURE — 83880 ASSAY OF NATRIURETIC PEPTIDE: CPT

## 2021-11-28 PROCEDURE — 74011000636 HC RX REV CODE- 636: Performed by: RADIOLOGY

## 2021-11-28 PROCEDURE — 74011250637 HC RX REV CODE- 250/637: Performed by: INTERNAL MEDICINE

## 2021-11-28 PROCEDURE — 99233 SBSQ HOSP IP/OBS HIGH 50: CPT | Performed by: FAMILY MEDICINE

## 2021-11-28 PROCEDURE — 85025 COMPLETE CBC W/AUTO DIFF WBC: CPT

## 2021-11-28 PROCEDURE — 83615 LACTATE (LD) (LDH) ENZYME: CPT

## 2021-11-28 PROCEDURE — 71275 CT ANGIOGRAPHY CHEST: CPT

## 2021-11-28 RX ORDER — FUROSEMIDE 10 MG/ML
20 INJECTION INTRAMUSCULAR; INTRAVENOUS DAILY
Status: DISCONTINUED | OUTPATIENT
Start: 2021-11-29 | End: 2021-11-28

## 2021-11-28 RX ORDER — INSULIN GLARGINE 100 [IU]/ML
70 INJECTION, SOLUTION SUBCUTANEOUS 2 TIMES DAILY
Status: DISCONTINUED | OUTPATIENT
Start: 2021-11-28 | End: 2021-11-30

## 2021-11-28 RX ORDER — FAMOTIDINE 20 MG/1
20 TABLET, FILM COATED ORAL 2 TIMES DAILY
Status: DISCONTINUED | OUTPATIENT
Start: 2021-11-28 | End: 2021-12-05 | Stop reason: HOSPADM

## 2021-11-28 RX ORDER — INSULIN LISPRO 100 [IU]/ML
12 INJECTION, SOLUTION INTRAVENOUS; SUBCUTANEOUS
Status: DISCONTINUED | OUTPATIENT
Start: 2021-11-28 | End: 2021-11-29

## 2021-11-28 RX ORDER — INSULIN LISPRO 100 [IU]/ML
12 INJECTION, SOLUTION INTRAVENOUS; SUBCUTANEOUS
Status: DISCONTINUED | OUTPATIENT
Start: 2021-11-29 | End: 2021-11-28

## 2021-11-28 RX ORDER — INSULIN LISPRO 100 [IU]/ML
10 INJECTION, SOLUTION INTRAVENOUS; SUBCUTANEOUS
Status: DISCONTINUED | OUTPATIENT
Start: 2021-11-28 | End: 2021-11-28

## 2021-11-28 RX ORDER — INSULIN LISPRO 100 [IU]/ML
INJECTION, SOLUTION INTRAVENOUS; SUBCUTANEOUS
Status: DISCONTINUED | OUTPATIENT
Start: 2021-11-28 | End: 2021-12-05 | Stop reason: HOSPADM

## 2021-11-28 RX ORDER — FUROSEMIDE 10 MG/ML
20 INJECTION INTRAMUSCULAR; INTRAVENOUS DAILY
Status: DISCONTINUED | OUTPATIENT
Start: 2021-11-28 | End: 2021-11-28

## 2021-11-28 RX ORDER — FUROSEMIDE 10 MG/ML
20 INJECTION INTRAMUSCULAR; INTRAVENOUS ONCE
Status: COMPLETED | OUTPATIENT
Start: 2021-11-28 | End: 2021-11-28

## 2021-11-28 RX ORDER — INSULIN LISPRO 100 [IU]/ML
INJECTION, SOLUTION INTRAVENOUS; SUBCUTANEOUS
Status: DISCONTINUED | OUTPATIENT
Start: 2021-11-28 | End: 2021-11-28

## 2021-11-28 RX ORDER — POTASSIUM CHLORIDE 750 MG/1
20 TABLET, FILM COATED, EXTENDED RELEASE ORAL
Status: COMPLETED | OUTPATIENT
Start: 2021-11-28 | End: 2021-11-28

## 2021-11-28 RX ADMIN — INSULIN LISPRO 12 UNITS: 100 INJECTION, SOLUTION INTRAVENOUS; SUBCUTANEOUS at 17:25

## 2021-11-28 RX ADMIN — DEXAMETHASONE 10 MG: 4 TABLET ORAL at 21:22

## 2021-11-28 RX ADMIN — APIXABAN 5 MG: 5 TABLET, FILM COATED ORAL at 08:29

## 2021-11-28 RX ADMIN — Medication 10 ML: at 17:26

## 2021-11-28 RX ADMIN — INSULIN GLARGINE 60 UNITS: 100 INJECTION, SOLUTION SUBCUTANEOUS at 08:30

## 2021-11-28 RX ADMIN — INSULIN LISPRO 8 UNITS: 100 INJECTION, SOLUTION INTRAVENOUS; SUBCUTANEOUS at 11:36

## 2021-11-28 RX ADMIN — BARICITINIB 4 MG: 2 TABLET, FILM COATED ORAL at 11:35

## 2021-11-28 RX ADMIN — INSULIN LISPRO 15 UNITS: 100 INJECTION, SOLUTION INTRAVENOUS; SUBCUTANEOUS at 17:25

## 2021-11-28 RX ADMIN — FAMOTIDINE 20 MG: 10 INJECTION, SOLUTION INTRAVENOUS at 08:30

## 2021-11-28 RX ADMIN — INSULIN LISPRO 8 UNITS: 100 INJECTION, SOLUTION INTRAVENOUS; SUBCUTANEOUS at 21:22

## 2021-11-28 RX ADMIN — INSULIN LISPRO 7 UNITS: 100 INJECTION, SOLUTION INTRAVENOUS; SUBCUTANEOUS at 11:35

## 2021-11-28 RX ADMIN — TAMSULOSIN HYDROCHLORIDE 0.4 MG: 0.4 CAPSULE ORAL at 08:29

## 2021-11-28 RX ADMIN — HYDROCHLOROTHIAZIDE 25 MG: 25 TABLET ORAL at 08:28

## 2021-11-28 RX ADMIN — Medication 10 ML: at 21:23

## 2021-11-28 RX ADMIN — INSULIN LISPRO 10 UNITS: 100 INJECTION, SOLUTION INTRAVENOUS; SUBCUTANEOUS at 08:30

## 2021-11-28 RX ADMIN — ATORVASTATIN CALCIUM 40 MG: 20 TABLET, FILM COATED ORAL at 08:29

## 2021-11-28 RX ADMIN — POTASSIUM CHLORIDE 20 MEQ: 750 TABLET, FILM COATED, EXTENDED RELEASE ORAL at 05:47

## 2021-11-28 RX ADMIN — OXYCODONE HYDROCHLORIDE AND ACETAMINOPHEN 500 MG: 500 TABLET ORAL at 17:24

## 2021-11-28 RX ADMIN — INSULIN GLARGINE 70 UNITS: 100 INJECTION, SOLUTION SUBCUTANEOUS at 17:25

## 2021-11-28 RX ADMIN — APIXABAN 5 MG: 5 TABLET, FILM COATED ORAL at 17:25

## 2021-11-28 RX ADMIN — Medication 1 CAPSULE: at 08:29

## 2021-11-28 RX ADMIN — FUROSEMIDE 20 MG: 10 INJECTION, SOLUTION INTRAMUSCULAR; INTRAVENOUS at 17:25

## 2021-11-28 RX ADMIN — DEXAMETHASONE 10 MG: 4 TABLET ORAL at 08:29

## 2021-11-28 RX ADMIN — OXYCODONE HYDROCHLORIDE AND ACETAMINOPHEN 500 MG: 500 TABLET ORAL at 08:29

## 2021-11-28 RX ADMIN — IOPAMIDOL 80 ML: 755 INJECTION, SOLUTION INTRAVENOUS at 15:54

## 2021-11-28 RX ADMIN — FAMOTIDINE 20 MG: 20 TABLET, FILM COATED ORAL at 17:24

## 2021-11-28 RX ADMIN — DILTIAZEM HYDROCHLORIDE 240 MG: 240 CAPSULE, COATED, EXTENDED RELEASE ORAL at 08:29

## 2021-11-28 RX ADMIN — METOPROLOL SUCCINATE 50 MG: 50 TABLET, EXTENDED RELEASE ORAL at 08:29

## 2021-11-28 RX ADMIN — INSULIN LISPRO 8 UNITS: 100 INJECTION, SOLUTION INTRAVENOUS; SUBCUTANEOUS at 08:29

## 2021-11-28 NOTE — PROGRESS NOTES
1127  TRANSFER - IN REPORT:    Verbal report received from [facility]) on Gary Killer  being received from [facility] (unit) for routine progression of care      Report consisted of patients Situation, Background, Assessment and   Recommendations(SBAR). Information from the following report(s) SBAR, Kardex, Procedure Summary, Intake/Output, MAR, Accordion and Recent Results was reviewed with the receiving nurse. Opportunity for questions and clarification was provided. Assessment completed upon patients arrival to unit and care assumed. 1250  Tried to call the wife Trudi Costa) but no answer.      Visit Vitals  /67 (BP 1 Location: Left upper arm, BP Patient Position: At rest)   Pulse 71   Temp 98 °F (36.7 °C)   Resp 27   Ht 6' 2\" (1.88 m)   Wt (!) 162.6 kg (358 lb 7.5 oz)   SpO2 93%   BMI 46.02 kg/m²     1700  Dr. Carrasco Begun ordered to continue trending lactic and to give 15 units of lispro plus the additional 10 unit for BG of 388.     1900

## 2021-11-28 NOTE — PROGRESS NOTES
[facility] Dosing Services: IV to PO Conversion    The pharmacist has determined that this patient meets P & T approved criteria for conversion from IV to oral therapy for the following medication:Famotidine 20 mg IV q12hr    The pharmacist has written the following order for the patient: Famotidine 20 mg po q12hr  The pharmacist will continue to monitor the patient's status and advise the physician if conversion back to IV therapy is recommended.     Signed Jakub Almanza Contact information:  [phone]

## 2021-11-28 NOTE — PROGRESS NOTES
Patient was desaturating to 83% sustaining with 5L of oxygen NC. Respiratory called and Family practice. Patient was put on high flow nasal cannula 15L.  Oxygen saturation sustaining at 90% at this time with 15L/min

## 2021-11-28 NOTE — RT PROTOCOL NOTE
Patient has an order for NIPPV @ night. Patient has refused a set-up.   Patient states that he has no history of sleep apnea and has never been   Diagnosed with sleep apnea and have never used a NIPPV for sleep or any acute diagnosis in the past.  Patient was placed on oxygen at 4L/m  In the ED and remains on 4L/m with an spo2 between 92-93% @ rest.

## 2021-11-28 NOTE — CONSULTS
[facility]     Name: Greg Petersen MRN: [id number]   : 1960 Hospital: [address]   Date: 2021        Impression Plan   1. Acute respiratory failure  2. COVID 19 PNA  3. Hypoxia  4. Morbid obesity  5. Hx of DVT               · Wean O2 to keep sats above 90%. Will order HF incase O2 requirement goes higher. · Start Baricitinib  · Continue dexamethasone 10 mg bid  · OOB into chair  · Self prone at night for 3 hrs if possible  · Follow d-dimer  · Follow inflammatory marker  · Continue eliquis         Radiology  ( personally reviewed) CXR: bilateral infiltrates   ABG No results for input(s): PHI, PO2I, PCO2I in the last 72 hours. Subjective     Cc: shortness of breath    63 yo with morbid obesity, DVT, presenting with covid 19 PNA. Pt first diagnosed . Started to get more SOB in the last couple of days. O2 requirement has increased drastically in the last 24 hrs. Now up to 15 liters HF. Lifetime non-smoker. No hx of lung problems. Unvaccinated. Review of Systems:  A comprehensive review of systems was negative except for that written in the HPI. Past Medical History:   Diagnosis Date    AR (allergic rhinitis) 2009    Diabetes ([facility]     Dr. Aleida Lin    History of vascular access device 2020    4f single picc placed on r cephalic, 06LA at 3cm out placed by CARMEN Lowry, difficult limited access    Hypercholesterolemia     Hypertension       Past Surgical History:   Procedure Laterality Date    HX HEART CATHETERIZATION      normal      Prior to Admission medications    Medication Sig Start Date End Date Taking? Authorizing Provider   dexAMETHasone (DECADRON) 6 mg tablet Take 1 Tablet by mouth Daily (before breakfast). Start morning of 21  Yes Guilherme Youssef MD   zinc sulfate (ZINCATE) 50 mg zinc (220 mg) capsule Take 1 Capsule by mouth daily.  21  Yes Guilherme Youssef MD   ascorbic acid, vitamin C, (VITAMIN C) 500 mg tablet Take 1 Tablet by mouth two (2) times a day. 11/27/21  Yes Giovanni Torres MD   insulin glargine U-300 conc (Toujeo Max U-300 SoloStar) 300 unit/mL (3 mL) inpn 100 Units by SubCUTAneous route nightly. Yes Provider, Historical   apixaban (ELIQUIS) 5 mg tablet Take 5 mg by mouth two (2) times a day. Indications: blood clot in a deep vein of the extremities   Yes Provider, Historical   metoprolol succinate (TOPROL-XL) 50 mg XL tablet Take 50 mg by mouth daily. Yes Provider, Historical   insulin lispro (HUMALOG) 100 unit/mL kwikpen by SubCUTAneous route Before breakfast, lunch, and dinner. Sliding scale   Yes Provider, Historical   dilTIAZem ER (CARDIZEM LA) 240 mg Tb24 tablet Take 240 mg by mouth daily. Yes Provider, Historical   hydroCHLOROthiazide (HYDRODIURIL) 25 mg tablet Take 25 mg by mouth daily. Yes Provider, Historical   rosuvastatin (CRESTOR) 20 mg tablet Take 20 mg by mouth nightly. Yes Provider, Historical   ondansetron (ZOFRAN ODT) 4 mg disintegrating tablet Take 1 Tablet by mouth every eight (8) hours as needed for Nausea or Vomiting. Patient not taking: Reported on 11/26/2021 11/23/21   Nomi Ashley H, DO   triamcinolone acetonide (KENALOG) 0.1 % topical cream Apply  to affected area three (3) times daily as needed for Skin Irritation. use thin layer 8/6/21   Shamir Lockett H, DO   atorvastatin (LIPITOR) 40 mg tablet  2/15/21   Provider, Historical   aspirin delayed-release 81 mg tablet Take 81 mg by mouth daily. Patient not taking: Reported on 8/6/2021    Provider, Historical   metFORMIN (GLUCOPHAGE) 1,000 mg tablet Take 1,000 mg by mouth two (2) times daily (with meals).     Provider, Historical     Current Facility-Administered Medications   Medication Dose Route Frequency    dexAMETHasone (DECADRON) tablet 10 mg  10 mg Oral Q12H    baricitinib (OLUMIANT) tablet 4 mg  4 mg Oral DAILY    famotidine (PEPCID) tablet 20 mg  20 mg Oral BID    insulin lispro (HUMALOG) injection 8 Units  8 Units SubCUTAneous TID WITH MEALS    insulin glargine (LANTUS) injection 60 Units  60 Units SubCUTAneous BID    sodium chloride (NS) flush 5-40 mL  5-40 mL IntraVENous Q8H    insulin lispro (HUMALOG) injection   SubCUTAneous AC&HS    ascorbic acid (vitamin C) (VITAMIN C) tablet 500 mg  500 mg Oral BID    zinc sulfate (ZINCATE) 50 mg zinc (220 mg) capsule 1 Capsule  1 Capsule Oral DAILY    atorvastatin (LIPITOR) tablet 40 mg  40 mg Oral DAILY    dilTIAZem ER (CARDIZEM CD) capsule 240 mg  240 mg Oral DAILY    hydroCHLOROthiazide (HYDRODIURIL) tablet 25 mg  25 mg Oral DAILY    metoprolol succinate (TOPROL-XL) XL tablet 50 mg  50 mg Oral DAILY    apixaban (ELIQUIS) tablet 5 mg  5 mg Oral BID    tamsulosin (FLOMAX) capsule 0.4 mg  0.4 mg Oral DAILY     No Known Allergies   Social History     Tobacco Use    Smoking status: Former Smoker     Packs/day: 1.00     Years: 15.00     Pack years: 15.00     Types: Cigarettes, Pipe, Cigars     Quit date: 3/12/1991     Years since quittin.7    Smokeless tobacco: Never Used   Substance Use Topics    Alcohol use: No      Family History   Problem Relation Age of Onset    Heart Failure Father     Diabetes Brother     Heart Failure Paternal Grandfather          age 37 AMI          Laboratory: I have personally reviewed the critical care flowsheet and labs.      Recent Labs     21  0035 21  0940 21  1534   WBC 7.9 4.6 4.6   HGB 12.9 12.3 13.5   HCT 38.5 37.0 40.4    200 186     Recent Labs     21  0035 21  0940 21  1534   * 133* 134*   K 3.4* 3.8 3.1*    100 97   CO2 23 24 30   * 395* 201*   BUN 22* 19 20   CREA 1.21 1.16 1.23   CA 8.4* 8.0* 8.5   MG 1.9 1.8 1.9   ALB  --   --  3.0*   ALT  --   --  42       Objective:     Mode Rate Tidal Volume Pressure FiO2 PEEP                    Vital Signs:     TMAX(24)      Intake/Output:   Last shift:         Last 3 shifts: No intake/output data recorded. [id number]    Intake/Output Summary (Last 24 hours) at 11/28/2021 1040  Last data filed at 11/27/2021 1901  Gross per 24 hour   Intake    Output 300 ml   Net -300 ml     EXAM:   GENERAL: obese, dyspnic with exertion, HEENT:  PERRL, EOMI, no alar flaring or epistaxis, oral mucosa moist without cyanosis, NECK:  no jugular vein distention, no retractions, no thyromegaly or masses, LUNGS: Distant breathsounds bilaterally, HEART:  Regular rate and rhythm with no MGR; no edema is present, ABDOMEN:  soft with no tenderness, bowel sounds present, EXTREMITIES:  warm with no cyanosis, SKIN:  no jaundice or ecchymosis and NEUROLOGIC:  alert and oriented, grossly non-focal    Byron Sy MD  [facility] [address] [facility]

## 2021-11-28 NOTE — PROGRESS NOTES
1840  Received call from Dr. Pretty Leroy with endocrinology. They do not see inpatient consults unless patient is an existing patient. Notified family practice.

## 2021-11-28 NOTE — PROGRESS NOTES
Physical Therapy  11/28/2021    Order received and acknowledged. Chart reviewed. Pt already on therapy caseload. Will continue to follow per plan of care.     Thank Yandy Dailey, PT, DPT

## 2021-11-28 NOTE — PROGRESS NOTES
Bedside and Verbal shift change report given to Gloria Fleming RN (oncoming nurse) by Erica Smith RN (offgoing nurse). Report included the following information SBAR, Kardex, Intake/Output, MAR, Accordion and Recent Results.

## 2021-11-28 NOTE — PROGRESS NOTES
8:41 AM  CM reviewed EMR, previous CM set up home O2 for patient on 11/27/21 but patient was not medically stable to discharge. If patient is medically stable to discharge today, patient will need a new home O2 assessment. CM will follow.  Sage Singh

## 2021-11-29 ENCOUNTER — APPOINTMENT (OUTPATIENT)
Dept: NON INVASIVE DIAGNOSTICS | Age: 61
DRG: 177 | End: 2021-11-29
Attending: STUDENT IN AN ORGANIZED HEALTH CARE EDUCATION/TRAINING PROGRAM
Payer: COMMERCIAL

## 2021-11-29 LAB
ALBUMIN SERPL-MCNC: 2.6 G/DL (ref 3.5–5)
ALBUMIN/GLOB SERPL: 0.7 {RATIO} (ref 1.1–2.2)
ALP SERPL-CCNC: 54 U/L (ref 45–117)
ALT SERPL-CCNC: 49 U/L (ref 12–78)
ANION GAP SERPL CALC-SCNC: 10 MMOL/L (ref 5–15)
AST SERPL-CCNC: 38 U/L (ref 15–37)
ATRIAL RATE: 80 BPM
BASOPHILS # BLD: 0 K/UL (ref 0–0.1)
BASOPHILS NFR BLD: 0 % (ref 0–1)
BILIRUB SERPL-MCNC: 0.9 MG/DL (ref 0.2–1)
BUN SERPL-MCNC: 22 MG/DL (ref 6–20)
BUN/CREAT SERPL: 19 (ref 12–20)
C TRACH RRNA SPEC QL NAA+PROBE: NEGATIVE
CALCIUM SERPL-MCNC: 8.3 MG/DL (ref 8.5–10.1)
CALCULATED P AXIS, ECG09: 25 DEGREES
CALCULATED R AXIS, ECG10: 14 DEGREES
CALCULATED T AXIS, ECG11: 73 DEGREES
CHLORIDE SERPL-SCNC: 98 MMOL/L (ref 97–108)
CO2 SERPL-SCNC: 26 MMOL/L (ref 21–32)
CREAT SERPL-MCNC: 1.13 MG/DL (ref 0.7–1.3)
CRP SERPL-MCNC: 5.76 MG/DL (ref 0–0.6)
D DIMER PPP FEU-MCNC: 0.26 MG/L FEU (ref 0–0.65)
DIAGNOSIS, 93000: NORMAL
DIFFERENTIAL METHOD BLD: ABNORMAL
ECHO AO ASC DIAM: 3.6 CM
ECHO AV ANNULUS DIAM: 3.2 CM
ECHO AV AREA PEAK VELOCITY: 3.76 CM2
ECHO AV AREA VTI: 3.7 CM2
ECHO AV AREA/BSA PEAK VELOCITY: 1.4 CM2/M2
ECHO AV AREA/BSA VTI: 1.4 CM2/M2
ECHO AV MEAN GRADIENT: 4.1 MMHG
ECHO AV PEAK GRADIENT: 8.05 MMHG
ECHO AV PEAK VELOCITY: 141.86 CM/S
ECHO AV VTI: 27.65 CM
ECHO LA AREA 4C: 19.04 CM2
ECHO LA MAJOR AXIS: 4.36 CM
ECHO LA MINOR AXIS: 1.63 CM
ECHO LA VOL 2C: 48.55 ML (ref 18–58)
ECHO LA VOL 4C: 47.29 ML (ref 18–58)
ECHO LA VOL BP: 55.93 ML (ref 18–58)
ECHO LA VOL/BSA BIPLANE: 20.95 ML/M2 (ref 16–28)
ECHO LA VOLUME INDEX A2C: 18.18 ML/M2 (ref 16–28)
ECHO LA VOLUME INDEX A4C: 17.71 ML/M2 (ref 16–28)
ECHO LV E' LATERAL VELOCITY: 10.66 CM/S
ECHO LV E' SEPTAL VELOCITY: 8.6 CM/S
ECHO LV INTERNAL DIMENSION DIASTOLIC: 4.6 CM (ref 4.2–5.9)
ECHO LV INTERNAL DIMENSION SYSTOLIC: 3.4 CM
ECHO LV IVSD: 1.2 CM (ref 0.6–1)
ECHO LV MASS 2D: 207.4 G (ref 88–224)
ECHO LV MASS INDEX 2D: 77.7 G/M2 (ref 49–115)
ECHO LV POSTERIOR WALL DIASTOLIC: 1.22 CM (ref 0.6–1)
ECHO LVOT CARDIAC OUTPUT: 18.72 LITER/MINUTE
ECHO LVOT DIAM: 2.32 CM
ECHO LVOT PEAK GRADIENT: 6.32 MMHG
ECHO LVOT PEAK VELOCITY: 125.73 CM/S
ECHO LVOT SV: 102.4 ML
ECHO LVOT VTI: 24.15 CM
ECHO MV A VELOCITY: 68.46 CM/S
ECHO MV E DECELERATION TIME (DT): 264.06 MS
ECHO MV E VELOCITY: 108.65 CM/S
ECHO MV E/A RATIO: 1.59
ECHO MV E/E' LATERAL: 10.19
ECHO MV E/E' RATIO (AVERAGED): 11.41
ECHO MV E/E' SEPTAL: 12.63
ECHO PV MAX VELOCITY: 97 CM/S
ECHO PV PEAK INSTANTANEOUS GRADIENT SYSTOLIC: 3.78 MMHG
ECHO PV REGURGITANT MAX VELOCITY: 74.37 CM/S
ECHO RV INTERNAL DIMENSION: 4.18 CM
ECHO RV TAPSE: 2.29 CM (ref 1.5–2)
ECHO TV REGURGITANT MAX VELOCITY: 277.3 CM/S
ECHO TV REGURGITANT PEAK GRADIENT: 30.82 MMHG
EOSINOPHIL # BLD: 0 K/UL (ref 0–0.4)
EOSINOPHIL NFR BLD: 0 % (ref 0–7)
ERYTHROCYTE [DISTWIDTH] IN BLOOD BY AUTOMATED COUNT: 15.2 % (ref 11.5–14.5)
FERRITIN SERPL-MCNC: 444 NG/ML (ref 26–388)
GLOBULIN SER CALC-MCNC: 3.5 G/DL (ref 2–4)
GLUCOSE BLD STRIP.AUTO-MCNC: 392 MG/DL (ref 65–117)
GLUCOSE BLD STRIP.AUTO-MCNC: 447 MG/DL (ref 65–117)
GLUCOSE BLD STRIP.AUTO-MCNC: 449 MG/DL (ref 65–117)
GLUCOSE BLD STRIP.AUTO-MCNC: 491 MG/DL (ref 65–117)
GLUCOSE BLD STRIP.AUTO-MCNC: 503 MG/DL (ref 65–117)
GLUCOSE SERPL-MCNC: 368 MG/DL (ref 65–100)
HCT VFR BLD AUTO: 37 % (ref 36.6–50.3)
HGB BLD-MCNC: 12.4 G/DL (ref 12.1–17)
IMM GRANULOCYTES # BLD AUTO: 0 K/UL
IMM GRANULOCYTES NFR BLD AUTO: 0 %
LA VOL DISK BP: 51.75 ML (ref 18–58)
LACTATE SERPL-SCNC: 2.1 MMOL/L (ref 0.4–2)
LDH SERPL L TO P-CCNC: 394 U/L (ref 85–241)
LVOT MG: 2.54 MMHG
LYMPHOCYTES # BLD: 0.7 K/UL (ref 0.8–3.5)
LYMPHOCYTES NFR BLD: 7 % (ref 12–49)
MAGNESIUM SERPL-MCNC: 2.1 MG/DL (ref 1.6–2.4)
MCH RBC QN AUTO: 28.9 PG (ref 26–34)
MCHC RBC AUTO-ENTMCNC: 33.5 G/DL (ref 30–36.5)
MCV RBC AUTO: 86.2 FL (ref 80–99)
MONOCYTES # BLD: 0.6 K/UL (ref 0–1)
MONOCYTES NFR BLD: 6 % (ref 5–13)
N GONORRHOEA RRNA SPEC QL NAA+PROBE: NEGATIVE
NEUTS BAND NFR BLD MANUAL: 5 % (ref 0–6)
NEUTS SEG # BLD: 8 K/UL (ref 1.8–8)
NEUTS SEG NFR BLD: 82 % (ref 32–75)
NRBC # BLD: 0 K/UL (ref 0–0.01)
NRBC BLD-RTO: 0 PER 100 WBC
P-R INTERVAL, ECG05: 178 MS
PLATELET # BLD AUTO: 257 K/UL (ref 150–400)
PMV BLD AUTO: 10.2 FL (ref 8.9–12.9)
POTASSIUM SERPL-SCNC: 3.8 MMOL/L (ref 3.5–5.1)
PROT SERPL-MCNC: 6.1 G/DL (ref 6.4–8.2)
Q-T INTERVAL, ECG07: 330 MS
QRS DURATION, ECG06: 76 MS
QTC CALCULATION (BEZET), ECG08: 380 MS
RBC # BLD AUTO: 4.29 M/UL (ref 4.1–5.7)
RBC MORPH BLD: ABNORMAL
SERVICE CMNT-IMP: ABNORMAL
SODIUM SERPL-SCNC: 134 MMOL/L (ref 136–145)
SPECIMEN SOURCE: NORMAL
VENTRICULAR RATE, ECG03: 80 BPM
WBC # BLD AUTO: 9.3 K/UL (ref 4.1–11.1)

## 2021-11-29 PROCEDURE — 74011636637 HC RX REV CODE- 636/637: Performed by: STUDENT IN AN ORGANIZED HEALTH CARE EDUCATION/TRAINING PROGRAM

## 2021-11-29 PROCEDURE — 74011250637 HC RX REV CODE- 250/637: Performed by: STUDENT IN AN ORGANIZED HEALTH CARE EDUCATION/TRAINING PROGRAM

## 2021-11-29 PROCEDURE — 93306 TTE W/DOPPLER COMPLETE: CPT | Performed by: SPECIALIST

## 2021-11-29 PROCEDURE — 86140 C-REACTIVE PROTEIN: CPT

## 2021-11-29 PROCEDURE — 97116 GAIT TRAINING THERAPY: CPT

## 2021-11-29 PROCEDURE — 85025 COMPLETE CBC W/AUTO DIFF WBC: CPT

## 2021-11-29 PROCEDURE — 97110 THERAPEUTIC EXERCISES: CPT

## 2021-11-29 PROCEDURE — 77010033711 HC HIGH FLOW OXYGEN

## 2021-11-29 PROCEDURE — 83605 ASSAY OF LACTIC ACID: CPT

## 2021-11-29 PROCEDURE — 99233 SBSQ HOSP IP/OBS HIGH 50: CPT | Performed by: FAMILY MEDICINE

## 2021-11-29 PROCEDURE — 97535 SELF CARE MNGMENT TRAINING: CPT

## 2021-11-29 PROCEDURE — 80053 COMPREHEN METABOLIC PANEL: CPT

## 2021-11-29 PROCEDURE — 65660000000 HC RM CCU STEPDOWN

## 2021-11-29 PROCEDURE — 94760 N-INVAS EAR/PLS OXIMETRY 1: CPT

## 2021-11-29 PROCEDURE — 85379 FIBRIN DEGRADATION QUANT: CPT

## 2021-11-29 PROCEDURE — 82962 GLUCOSE BLOOD TEST: CPT

## 2021-11-29 PROCEDURE — C8929 TTE W OR WO FOL WCON,DOPPLER: HCPCS

## 2021-11-29 PROCEDURE — 74011250637 HC RX REV CODE- 250/637: Performed by: INTERNAL MEDICINE

## 2021-11-29 PROCEDURE — 74011250636 HC RX REV CODE- 250/636: Performed by: STUDENT IN AN ORGANIZED HEALTH CARE EDUCATION/TRAINING PROGRAM

## 2021-11-29 PROCEDURE — 36415 COLL VENOUS BLD VENIPUNCTURE: CPT

## 2021-11-29 PROCEDURE — 74011250636 HC RX REV CODE- 250/636: Performed by: FAMILY MEDICINE

## 2021-11-29 PROCEDURE — 83735 ASSAY OF MAGNESIUM: CPT

## 2021-11-29 PROCEDURE — 82728 ASSAY OF FERRITIN: CPT

## 2021-11-29 PROCEDURE — 97530 THERAPEUTIC ACTIVITIES: CPT

## 2021-11-29 PROCEDURE — 83615 LACTATE (LD) (LDH) ENZYME: CPT

## 2021-11-29 RX ORDER — FUROSEMIDE 10 MG/ML
20 INJECTION INTRAMUSCULAR; INTRAVENOUS DAILY
Status: DISCONTINUED | OUTPATIENT
Start: 2021-11-29 | End: 2021-12-05 | Stop reason: HOSPADM

## 2021-11-29 RX ORDER — INSULIN LISPRO 100 [IU]/ML
15 INJECTION, SOLUTION INTRAVENOUS; SUBCUTANEOUS
Status: DISCONTINUED | OUTPATIENT
Start: 2021-11-29 | End: 2021-11-29

## 2021-11-29 RX ORDER — INSULIN LISPRO 100 [IU]/ML
20 INJECTION, SOLUTION INTRAVENOUS; SUBCUTANEOUS
Status: DISCONTINUED | OUTPATIENT
Start: 2021-11-29 | End: 2021-11-29

## 2021-11-29 RX ORDER — LIDOCAINE 4 G/100G
1 PATCH TOPICAL
Status: DISCONTINUED | OUTPATIENT
Start: 2021-11-29 | End: 2021-12-05 | Stop reason: HOSPADM

## 2021-11-29 RX ORDER — INSULIN LISPRO 100 [IU]/ML
20 INJECTION, SOLUTION INTRAVENOUS; SUBCUTANEOUS ONCE
Status: COMPLETED | OUTPATIENT
Start: 2021-11-29 | End: 2021-11-29

## 2021-11-29 RX ORDER — INSULIN LISPRO 100 [IU]/ML
20 INJECTION, SOLUTION INTRAVENOUS; SUBCUTANEOUS
Status: DISCONTINUED | OUTPATIENT
Start: 2021-11-30 | End: 2021-11-30

## 2021-11-29 RX ADMIN — INSULIN LISPRO 20 UNITS: 100 INJECTION, SOLUTION INTRAVENOUS; SUBCUTANEOUS at 22:09

## 2021-11-29 RX ADMIN — INSULIN LISPRO 15 UNITS: 100 INJECTION, SOLUTION INTRAVENOUS; SUBCUTANEOUS at 13:15

## 2021-11-29 RX ADMIN — PERFLUTREN 2 ML: 6.52 INJECTION, SUSPENSION INTRAVENOUS at 09:57

## 2021-11-29 RX ADMIN — APIXABAN 5 MG: 5 TABLET, FILM COATED ORAL at 17:44

## 2021-11-29 RX ADMIN — INSULIN GLARGINE 70 UNITS: 100 INJECTION, SOLUTION SUBCUTANEOUS at 17:44

## 2021-11-29 RX ADMIN — INSULIN HUMAN 20 UNITS: 100 INJECTION, SUSPENSION SUBCUTANEOUS at 09:36

## 2021-11-29 RX ADMIN — OXYCODONE HYDROCHLORIDE AND ACETAMINOPHEN 500 MG: 500 TABLET ORAL at 09:34

## 2021-11-29 RX ADMIN — METOPROLOL SUCCINATE 50 MG: 50 TABLET, EXTENDED RELEASE ORAL at 09:34

## 2021-11-29 RX ADMIN — APIXABAN 5 MG: 5 TABLET, FILM COATED ORAL at 09:34

## 2021-11-29 RX ADMIN — DILTIAZEM HYDROCHLORIDE 240 MG: 240 CAPSULE, COATED, EXTENDED RELEASE ORAL at 09:34

## 2021-11-29 RX ADMIN — OXYCODONE HYDROCHLORIDE AND ACETAMINOPHEN 500 MG: 500 TABLET ORAL at 17:44

## 2021-11-29 RX ADMIN — Medication 10 ML: at 22:11

## 2021-11-29 RX ADMIN — INSULIN LISPRO 10 UNITS: 100 INJECTION, SOLUTION INTRAVENOUS; SUBCUTANEOUS at 17:43

## 2021-11-29 RX ADMIN — BARICITINIB 4 MG: 2 TABLET, FILM COATED ORAL at 09:34

## 2021-11-29 RX ADMIN — FAMOTIDINE 20 MG: 20 TABLET, FILM COATED ORAL at 17:44

## 2021-11-29 RX ADMIN — TAMSULOSIN HYDROCHLORIDE 0.4 MG: 0.4 CAPSULE ORAL at 09:34

## 2021-11-29 RX ADMIN — Medication 10 ML: at 13:15

## 2021-11-29 RX ADMIN — FAMOTIDINE 20 MG: 20 TABLET, FILM COATED ORAL at 09:34

## 2021-11-29 RX ADMIN — INSULIN LISPRO 15 UNITS: 100 INJECTION, SOLUTION INTRAVENOUS; SUBCUTANEOUS at 17:44

## 2021-11-29 RX ADMIN — Medication 1 CAPSULE: at 09:34

## 2021-11-29 RX ADMIN — INSULIN LISPRO 20 UNITS: 100 INJECTION, SOLUTION INTRAVENOUS; SUBCUTANEOUS at 13:15

## 2021-11-29 RX ADMIN — FUROSEMIDE 20 MG: 10 INJECTION, SOLUTION INTRAMUSCULAR; INTRAVENOUS at 09:35

## 2021-11-29 RX ADMIN — ATORVASTATIN CALCIUM 40 MG: 20 TABLET, FILM COATED ORAL at 09:34

## 2021-11-29 RX ADMIN — INSULIN LISPRO 10 UNITS: 100 INJECTION, SOLUTION INTRAVENOUS; SUBCUTANEOUS at 09:35

## 2021-11-29 RX ADMIN — INSULIN GLARGINE 70 UNITS: 100 INJECTION, SOLUTION SUBCUTANEOUS at 09:35

## 2021-11-29 RX ADMIN — Medication 10 ML: at 06:08

## 2021-11-29 RX ADMIN — HYDROCHLOROTHIAZIDE 25 MG: 25 TABLET ORAL at 09:34

## 2021-11-29 RX ADMIN — DEXAMETHASONE 10 MG: 4 TABLET ORAL at 09:35

## 2021-11-29 NOTE — PROGRESS NOTES
[address] [facility] [address] [place] [address]   Office [phone]  Fax [phone]          Assessment and Plan     Hawk Garza is a 64 y.o. male with a PMH of HTN, DM2, HLD, chronic lymphedema, history of right femoral DVT in 2020 now on [facility] admitted for acute hypoxic respiratory failure 2/2 COVID PNA and lactic acidosis. Patient was admitted on 11/26/2021.    24-Hour Events: LA downtrended to 2.1, stopped trending. Glucose of 403 this AM, with 8u Lispro given. Acute hypoxic respiratory failure 2/2 COVID pneumonia: Pt with O2 sat of 88% at time of admission. Symptom onset 11/18. Presenting symptoms acutely worsened on date of admission, prompting presentation to the ED. In the ED, given Decadron IV 10mg, 1L NS, with symptomatic improvement. Pt met 1/4 SIRS criteria on admission, and CURB-65 1/5. Pt currently requiring 40 L HFNC. Rapidly increasing O2 needs on 11/28 - prompting CTA chest - negative for PE. Started on Baricitinib on 11/28.  - Continue supplemental O2, wean as tolerated  - RT consulted to determine home O2 needs  - PT/OT consulted  - Decadron 10 mg changed to daily given hyperglycemia  - Continue Baricitinib 4 mg daily (started 11/28)  - Lasix IV 20mg daily  - Pulmonology following, appreciate further recommendations  - COVID meds (Atorvastatin 40mg every day, Vitamin C 500mg BID, and Zinc 220mg every day)  - CBC with diff daily, COVID labs (CRP, D-dimer, ferritin, LD) daily  - f/u ECHO     T2DM: Last A1c 7.9% in 09/2020. On Lantus 100u QHS, SSI Lispro, Metformin 1000mg BID at home. - Lantus 70u BID   - Lispro 15 units TID w/ meals  - NPH 30u linked with Dexamethasone  - SSI  - POC BG ACHS  - Hypoglycemia protocols ordered     History of DVT: On Eliquis at home. F/b Dr. Chidi Frank. Unclear etiology - not provoked per Dr. Meagan Deras note. Denies fh/o VTE that pt is aware of.  Per chart review, pt started on Xarelto, however pt reportedly taking Eliquis per medication reconciliation with wife over phone.  - Continue Eliquis 5mg BID  - Up and out of bed as tolerated     Hypokalemia: POA 3.1. Wnl.  - BMP and Mg daily  - Replete as needed  - Patient on remote tele     HLD: Last lipid panel 08/2020 with , HDL 43, LDL 99, . On Rosuvastatin 20mg daily at home. - Will substitute with Atorvastatin 40mg daily here     HTN: POA /68. On Metoprolol XL 50mg daily, Diltiazem 240mg daily, and HCTZ 25mg daily. - Will continue BP meds  - Continue IV Lasix 20mg daily  - Monitor and adjust as required     Chronic lymphedema: Chronic venous stasis and 3+ pitting edema noted on exam. Pt states he uses compression stockings at home. - Compression stockings ordered  - Lasix as above  - Elevate lower extremities     BPH: Chronic, stable. - Continue Alfuzosin 10mg daily - pharm to substitute     Obesity:  - The pt's Body mass index is 48.15 kg/m². - Encouraging lifestyle modifications and further follow up outpatient. Dysuria: Noted per pt. Likely 2/2 decreased UOP in setting of poor PO intake. UA now without evidence of infection.  - Continue to monitor VS, labs, and symptoms  - Bladder checks and straight cath PRN    Lactic acidosis: DOWNTRENDED. POA 3.0 -> 2.4 -> 2.2 -> 2.5 -> 3.0 -> 3.2 -> 2.8 -> 2.9 -> 2.1. Concern for PE given, O2 requirement. - Stopped trending   - BMP daily    Troponinemia (resolved): Troponin of 15 -> 22 -> 15. Pt without chest pain or tightness. Low concern for ACS. Per chart review, last [facility] 2007 was wnl.  - Will continue to monitor symptoms, electrolytes  - Patient on remote cardiac monitoring       FEN/GI - Diabetic diet. Activity - Out of bed with assistance  DVT prophylaxis - Eliquis  GI prophylaxis - Pepcid 20 BID  Fall prophylaxis - Fall precautions ordered. Disposition - Admit to Remote Telemetry. PT recommends 2 days/week home PT  Code Status - Partial (DNI). Discussed with patient / caregivers.   Next of Kin Name and Krish Guevara Schroeder [address] Beth Nix [facility] Medicine Residency Attending Addendum:  I saw and evaluated the patient, performing the key elements of the service. I discussed the findings, assessment and plan with the resident and agree with the resident's findings and plan as documented in the resident's note. The patient was seen on 11/29/2021. Oxygen requirement has remained stable over the past day and was able to get some sleep overnight. On high levels of insulin prior to admission. Hyperglycemia exacerbated by high dose steroids. Working to adjust based on SSI need. Will aslo need close monitoring once steroids are weaned. Uses CGM at home. Electronic Signature:  Buffy Santana MD  11/29/2021 4:14 PM             Subjective / Objective     Subjective   Patient reports his breathing has improved. No chest pain, palp, abdominal pain, n/v, c/d. Respiratory: O2 Flow Rate (L/min): 40 l/min O2 Device: Heated, Hi flow nasal cannula   Visit Vitals  BP (!) 147/77 (BP 1 Location: Right arm, BP Patient Position: At rest)   Pulse 66   Temp 97.9 °F (36.6 °C)   Resp 26   Ht 6' 2\" (1.88 m)   Wt 325 lb (147.4 kg)   SpO2 93%   BMI 41.73 kg/m²       General: No acute distress. Alert. Cooperative. Obese male positioned comfortably in bed  Respiratory: Fine bibasilar crackles, distal lung sounds  Cardiovascular: Regular rate and rhythm. GI: + bowel sounds. Nontender. No rebound tenderness or guarding. Protuberant abdomen  Extremities:  2+ pitting LE edema bilateral lower extremities w/ chronic venous stasis changes. Skin: Warm, dry. Lymphedema in bilateral LE. Psych: Mood is good today w/o significant anxiety. Neuro: Awake, alert, and appropriately conversant. I/O:  Date 11/28/21 0700 - 11/29/21 0659 11/29/21 0700 - 11/30/21 0659   Shift 8515-1821 3635-8713 24 Hour Total 1631-2188 5440-3155 24 Hour Total   INTAKE   P.O.  360 360        P. O.  360 360      Shift Total(mL/kg)  360(2.4) 360(2.4)      OUTPUT   Urine(mL/kg/hr) 750(0.4) 1175(0.7) 1925(0.5) 1800  1800     Urine Voided 750 1175 1925 1800  1800     Urine Occurrence(s)    1 x  1 x   Emesis/NG output           Emesis Occurrence(s)    0 x  0 x   Stool           Stool Occurrence(s)    0 x  0 x   Shift Total(mL/kg) 750(4.6) 1175(8) 1925(13.1) 1800(12.2)  1800(12.2)   NET -750 -815 -1565 -1800  -1800   Weight (kg) 162.6 147.4 147.4 147.4 147.4 147.4       CBC:  Recent Labs     11/29/21  0108 11/28/21  0035 11/27/21  0940   WBC 9.3 7.9 4.6   HGB 12.4 12.9 12.3   HCT 37.0 38.5 37.0    249 094       Metabolic Panel:  Recent Labs     11/29/21  0108 11/28/21  0035 11/27/21  0940 11/26/21  1534 11/26/21  1534   * 133* 133*   < > 134*   K 3.8 3.4* 3.8   < > 3.1*   CL 98 100 100   < > 97   CO2 26 23 24   < > 30   BUN 22* 22* 19   < > 20   CREA 1.13 1.21 1.16   < > 1.23   * 374* 395*   < > 201*   CA 8.3* 8.4* 8.0*   < > 8.5   MG 2.1 1.9 1.8   < > 1.9   ALB 2.6*  --   --   --  3.0*   ALT 49  --   --   --  42    < > = values in this interval not displayed.           For Billing    Chief Complaint   Patient presents with   [facility] Problems  Date Reviewed: 11/28/2021          Codes Class Noted POA    * (Principal) Acute hypoxemic respiratory failure due to COVID-19 [facility]) ICD-10-CM: U07.1, J96.01  ICD-9-CM: 518.81, 079.89, 799.02  11/26/2021 Yes        Lactic acidosis ICD-10-CM: E87.2  ICD-9-CM: 276.2  8/15/2020 Yes        Lymphedema ICD-10-CM: I89.0  ICD-9-CM: 457.1  11/27/2021 Yes        BPH (benign prostatic hyperplasia) ICD-10-CM: N40.0  ICD-9-CM: 600.00  11/27/2021 Yes        Elevated troponin ICD-10-CM: R77.8  ICD-9-CM: 790.6  11/27/2021 Yes        History of DVT of lower extremity ICD-10-CM: Z86.718  ICD-9-CM: V12.51  11/27/2021 Yes        Hyperlipemia (Chronic) ICD-10-CM: E78.5  ICD-9-CM: 272.4  3/12/2009 Yes        HTN (hypertension) (Chronic) ICD-10-CM: I10  ICD-9-CM: 401.9  3/12/2009 Yes Type 2 diabetes mellitus without complication, with long-term current use of insulin (HCC) ICD-10-CM: E11.9, Z79.4  ICD-9-CM: 250.00, V58.67  3/12/1999 Yes

## 2021-11-29 NOTE — PROGRESS NOTES
0730- Bedside and Verbal shift change report given to MADELEINE Moore (oncoming nurse) by Can Fierro RN (offgoing nurse). Report included the following information SBAR, Kardex, ED Summary, OR Summary, Procedure Summary, Intake/Output, MAR, Accordion, and Recent Results. This patient was assisted with Intentional Toileting every 2 hours during this shift. Documentation of ambulation and output reflected on Flowsheet. 1930-Bedside and Verbal shift change report given to RN (oncoming nurse) by MADELEINE Moore(offgoing nurse). Report included the following information SBAR, Kardex, ED Summary, OR Summary, Procedure Summary, Intake/Output, MAR, Accordion, Recent Results, and Med Rec Status.

## 2021-11-29 NOTE — PROGRESS NOTES
Care Management follow up    Patient admitted for increased shortness of breath, cough. COVID+, pneumonia. History of: HTN, diabetes, hyperlipidemia, chronic lymphedema, right DVT. COVID Vaccine:  no      RUR 8 (Score %) low   Is This a Readmission NO  Is this a Bundle NO    Current status  Patient discussed during interdisciplinary rounds. Patient continues to require medical management including ongoing assessment and monitoring. Continues on high flow oxygen at 40L/100%. Started Baricitinib 11/28/21. Transition of Care Plan  1. Monitor patient status and response to treatment. 2. Patient continues to require medical management. 3. Unsure of DC needs at this time. 4. Will monitor for home oxygen needs closer to DC. 5. CM to monitor progress and recommendations.     Lalito Trevino, RN, MSN/Care manager

## 2021-11-29 NOTE — PROGRESS NOTES
[facility]     Name: Alejandro Travis MRN: [id number]   : 1960 Hospital: [facility]   Date: 2021        Impression Plan   1. Acute respiratory failure  2. COVID 19 PNA  3. Hypoxia  4. Morbid obesity  5. Hx of DVT               · Wean O2 to keep sats above 90%. Will order HF incase O2 requirement goes higher. · Start Baricitinib  · Continue dexamethasone 10 mg bid  · OOB into chair  · Self prone at night for 3 hrs if possible  · Follow d-dimer  · Follow inflammatory marker  · Continue eliquis  · Needs to use IS--RN will bring patient one     Patient is critically ill and at high risk for decompensation due to acute respiratory failure requiring high flow nasal cannula. CCT: 37 minutes    Radiology  ( personally reviewed) CXR: bilateral infiltrates   ABG No results for input(s): PHI, PO2I, PCO2I in the last 72 hours. Subjective     Cc: shortness of breath    63 yo with morbid obesity, DVT, presenting with covid 19 PNA. Pt first diagnosed . Started to get more SOB in the last couple of days. O2 requirement has increased drastically in the last 24 hrs. Now up to 15 liters HF. Lifetime non-smoker. No hx of lung problems. Unvaccinated. Review of Systems:  A comprehensive review of systems was negative except for that written in the HPI. Interval History:    Afebrile  BP stable  Sats 90% on 40L/100% HFNC  D-dimer . 26--decreased  Lactic acid 2.1--decreased  --increased  Ferritin 444--increased  CRP 5.66--increased  Blood cultures no growth x 3 days    ROS:  Feels about the same today from a breathing standpoint. No specific complaints. Trying to prone and lay on side.       Past Medical History:   Diagnosis Date    AR (allergic rhinitis) 2009    Diabetes ([facility] [address]     Dr. Kermit Lau    History of vascular access device 2020    4f single picc placed on r cephalic, 36VH at 3cm out placed by CARMEN Lowry, difficult limited access    Hypercholesterolemia     Hypertension       Past Surgical History:   Procedure Laterality Date    HX HEART CATHETERIZATION  2007    normal      Prior to Admission medications    Medication Sig Start Date End Date Taking? Authorizing Provider   dexAMETHasone (DECADRON) 6 mg tablet Take 1 Tablet by mouth Daily (before breakfast). Start morning of 11/28 11/27/21  Yes Charlee Yung MD   zinc sulfate (ZINCATE) 50 mg zinc (220 mg) capsule Take 1 Capsule by mouth daily. 11/27/21  Yes Charlee Yung MD   ascorbic acid, vitamin C, (VITAMIN C) 500 mg tablet Take 1 Tablet by mouth two (2) times a day. 11/27/21  Yes Charlee Yung MD   insulin glargine U-300 conc (Toujeo Max U-300 SoloStar) 300 unit/mL (3 mL) inpn 100 Units by SubCUTAneous route nightly. Yes Provider, Historical   apixaban (ELIQUIS) 5 mg tablet Take 5 mg by mouth two (2) times a day. Indications: blood clot in a deep vein of the extremities   Yes Provider, Historical   metoprolol succinate (TOPROL-XL) 50 mg XL tablet Take 50 mg by mouth daily. Yes Provider, Historical   insulin lispro (HUMALOG) 100 unit/mL kwikpen by SubCUTAneous route Before breakfast, lunch, and dinner. Sliding scale   Yes Provider, Historical   dilTIAZem ER (CARDIZEM LA) 240 mg Tb24 tablet Take 240 mg by mouth daily. Yes Provider, Historical   hydroCHLOROthiazide (HYDRODIURIL) 25 mg tablet Take 25 mg by mouth daily. Yes Provider, Historical   rosuvastatin (CRESTOR) 20 mg tablet Take 20 mg by mouth nightly. Yes Provider, Historical   ondansetron (ZOFRAN ODT) 4 mg disintegrating tablet Take 1 Tablet by mouth every eight (8) hours as needed for Nausea or Vomiting. Patient not taking: Reported on 11/26/2021 11/23/21   Aida PINEDA, DO   triamcinolone acetonide (KENALOG) 0.1 % topical cream Apply  to affected area three (3) times daily as needed for Skin Irritation.  use thin layer 8/6/21   Shamir Lockett, DO   atorvastatin (LIPITOR) 40 mg tablet  2/15/21   Provider, Historical aspirin delayed-release 81 mg tablet Take 81 mg by mouth daily. Patient not taking: Reported on 2021    Provider, Historical   metFORMIN (GLUCOPHAGE) 1,000 mg tablet Take 1,000 mg by mouth two (2) times daily (with meals).     Provider, Historical     Current Facility-Administered Medications   Medication Dose Route Frequency    furosemide (LASIX) injection 20 mg  20 mg IntraVENous DAILY    insulin lispro (HUMALOG) injection 15 Units  15 Units SubCUTAneous TID WITH MEALS    dexAMETHasone (DECADRON) tablet 10 mg  10 mg Oral Q12H    And    insulin NPH (NOVOLIN N, HUMULIN N) injection 20 Units  20 Units SubCUTAneous Q12H    baricitinib (OLUMIANT) tablet 4 mg  4 mg Oral DAILY    famotidine (PEPCID) tablet 20 mg  20 mg Oral BID    insulin glargine (LANTUS) injection 70 Units  70 Units SubCUTAneous BID    insulin lispro (HUMALOG) injection   SubCUTAneous QID WITH MEALS    sodium chloride (NS) flush 5-40 mL  5-40 mL IntraVENous Q8H    ascorbic acid (vitamin C) (VITAMIN C) tablet 500 mg  500 mg Oral BID    zinc sulfate (ZINCATE) 50 mg zinc (220 mg) capsule 1 Capsule  1 Capsule Oral DAILY    atorvastatin (LIPITOR) tablet 40 mg  40 mg Oral DAILY    dilTIAZem ER (CARDIZEM CD) capsule 240 mg  240 mg Oral DAILY    hydroCHLOROthiazide (HYDRODIURIL) tablet 25 mg  25 mg Oral DAILY    metoprolol succinate (TOPROL-XL) XL tablet 50 mg  50 mg Oral DAILY    apixaban (ELIQUIS) tablet 5 mg  5 mg Oral BID    tamsulosin (FLOMAX) capsule 0.4 mg  0.4 mg Oral DAILY     No Known Allergies   Social History     Tobacco Use    Smoking status: Former Smoker     Packs/day: 1.00     Years: 15.00     Pack years: 15.00     Types: Cigarettes, Pipe, Cigars     Quit date: 3/12/1991     Years since quittin.7    Smokeless tobacco: Never Used   Substance Use Topics    Alcohol use: No      Family History   Problem Relation Age of Onset    Heart Failure Father     Diabetes Brother     Heart Failure Paternal Grandfather  age 37 AMI          Laboratory: I have personally reviewed the critical care flowsheet and labs. Recent Labs     21  0108 21  0035 21  0940   WBC 9.3 7.9 4.6   HGB 12.4 12.9 12.3   HCT 37.0 38.5 37.0    249 200     Recent Labs     21  0108 21  0035 21  0940 21  1534 21  1534   * 133* 133*   < > 134*   K 3.8 3.4* 3.8   < > 3.1*   CL 98 100 100   < > 97   CO2 26 23 24   < > 30   * 374* 395*   < > 201*   BUN 22* 22* 19   < > 20   CREA 1.13 1.21 1.16   < > 1.23   CA 8.3* 8.4* 8.0*   < > 8.5   MG 2.1 1.9 1.8   < > 1.9   ALB 2.6*  --   --   --  3.0*   ALT 49  --   --   --  42    < > = values in this interval not displayed.        Objective:     Mode Rate Tidal Volume Pressure FiO2 PEEP            100 %       Vital Signs:     TMAX(24)      Intake/Output:   Last shift:         Last 3 shifts:  0701 -  1900  In: -   Out: 600 [Urine:600]RRIOLAST3    Intake/Output Summary (Last 24 hours) at 2021 1120  Last data filed at 2021 0908  Gross per 24 hour   Intake 360 ml   Output 2525 ml   Net -2165 ml     EXAM:   GENERAL: obese, dyspnic with exertion, HEENT:  PERRL, EOMI, no alar flaring or epistaxis, oral mucosa moist without cyanosis, NECK:  no jugular vein distention, no retractions, no thyromegaly or masses, LUNGS: Distant breathsounds bilaterally, HEART:  Regular rate and rhythm with no MGR; no edema is present, ABDOMEN:  soft with no tenderness, bowel sounds present, EXTREMITIES:  warm with no cyanosis, SKIN:  no jaundice or ecchymosis and NEUROLOGIC:  alert and oriented, grossly non-focal    Sherlie Sellar, NP  [facility]

## 2021-11-29 NOTE — PROGRESS NOTES
Bedside shift change report given to [facility] (oncoming nurse) by Ha Dean RN (offgoing nurse). Report included the following information SBAR, Kardex, ED Summary, Intake/Output, and Recent Results. 2112: spoke to MD about blood sugar, gave orders for 8units lispro    0239: pt lactic acid came back at 2.1, per Dr Faye Wesley, we can stop trending    Bedside shift change report given to [address] (oncoming nurse) by [facility] (offgoing nurse). Report included the following information SBAR, Kardex, ED Summary, Intake/Output, and Recent Results. This patient was assisted with Intentional Toileting every 2 hours during this shift as appropriate. Documentation of ambulation and output reflected on Flowsheet as appropriate. Purposeful hourly rounding was completed using AIDET and 5Ps. Outcomes of PHR documented as they occurred. Bed alarm in use as appropriate. Dual Suction and ambubag in place.

## 2021-11-29 NOTE — PROGRESS NOTES
Problem: Self Care Deficits Care Plan (Adult)  Goal: *Acute Goals and Plan of Care (Insert Text)  Description: FUNCTIONAL STATUS PRIOR TO ADMISSION: Patient was independent and active without use of DME.     HOME SUPPORT: The patient lived with wife but did not require assist.    Occupational Therapy Goals  Initiated 11/27/2021  1. Patient will perform grooming standing with supervision/set-up within 7 day(s). 2.  Patient will perform bathing with supervision/set-up within 7 day(s). 3.  Patient will perform lower body dressing with supervision/set-up within 7 day(s). 4.  Patient will perform toilet transfers with supervision/set-up within 7 day(s). 5.  Patient will participate in upper extremity therapeutic exercise/activities with supervision/set-up for 5 minutes within 7 day(s). 6.  Patient will utilize energy conservation techniques during functional activities with verbal cues within 7 day(s). Outcome: Progressing Towards Goal   OCCUPATIONAL THERAPY TREATMENT  Patient: Julio Mott (89 y.o. male)  Date: 11/29/2021  Diagnosis: Acute hypoxemic respiratory failure due to COVID-19 ([facility]) [U07.1, J96.01]   Acute hypoxemic respiratory failure due to COVID-19 [facility])       Precautions: Contact (COVID; Dropet plus)  Chart, occupational therapy assessment, plan of care, and goals were reviewed. ASSESSMENT  Patient continues with skilled OT services and is progressing towards goals. Patient is limited during ADL tasks and related mobility by dependence on high levels of supplemental O2 (40L heated hiflow at 100% FiO2). Patient is very motivated to maintain functional independence and independent ambulation though is at risk for deconditioning due to limited opportunity for ADL/ambulation participation secondary to short O2 lines with hiflow O2 machines.  HEP with handout provided initiated today- recommend reminders and encouragement for patient to complete exercise plan    Current Level of Function Impacting Discharge (ADLs): minimal assistance for basic ADL     Other factors to consider for discharge: independent PLOF; high  O2 demands at this time         PLAN :  Patient continues to benefit from skilled intervention to address the above impairments. Continue treatment per established plan of care to address goals. Recommend with staff: in chair for meals, active ADL participation     Recommend next OT session: progress POC- use handout for exercise completion    Recommendation for discharge: (in order for the patient to meet his/her long term goals)  To be determined: pending progress and O2 dependence     This discharge recommendation:  Has been made in collaboration with the attending provider and/or case management    IF patient discharges home will need the following DME: patient owns DME required for discharge       SUBJECTIVE:   Patient pleasant and cooperative     OBJECTIVE DATA SUMMARY:   Cognitive/Behavioral Status:  Neurologic State: Alert  Orientation Level: Oriented X4                Functional Mobility and Transfers for ADLs:  Bed Mobility:  Supine to Sit: Supervision    Transfers:  Sit to Stand: Contact guard assistance     Bed to Chair: Contact guard assistance    Balance:  Sitting: Intact; Without support  Standing: Impaired; Without support  Standing - Static: Good  Standing - Dynamic : Fair    ADL Intervention:     Patient instructed in ADL related mobility, standing tolerance/balance with no assistive devices in prep for ADL tasks (unable to access sink due to short O2 line)     Close Supv to CGA in stand. Therapeutic Exercises:   Patient instructed in and provided with handout including seated UE/LE exercises, energy conservation strategies, breathing strategies.       Pain:  none    Activity Tolerance:   Fair and desaturates with exertion and requires oxygen    After treatment patient left in no apparent distress:   Sitting in chair, Call bell within reach, and Bed / chair alarm activated    COMMUNICATION/COLLABORATION:   The patients plan of care was discussed with: Physical therapist and Registered nurse.      Nikia Suazo, OT  Time Calculation: 23 mins

## 2021-11-29 NOTE — PROGRESS NOTES
Problem: Mobility Impaired (Adult and Pediatric)  Goal: *Acute Goals and Plan of Care (Insert Text)  Description: FUNCTIONAL STATUS PRIOR TO ADMISSION: Patient was independent and active without use of DME.    HOME SUPPORT PRIOR TO ADMISSION: The patient lived with wife but did not require assist.    Physical Therapy Goals  Initiated 11/27/2021  1. Patient will move from supine to sit and sit to supine  in bed with independence within 7 day(s). 2.  Patient will transfer from bed to chair and chair to bed with independence using the least restrictive device within 7 day(s). 3.  Patient will perform sit to stand with independence within 7 day(s). 4.  Patient will ambulate with independence for 150 feet with the least restrictive device within 7 day(s). Outcome: Progressing Towards Goal   PHYSICAL THERAPY TREATMENT  Patient: Jamil Arguello (50 y.o. male)  Date: 11/29/2021  Diagnosis: Acute hypoxemic respiratory failure due to COVID-19 ([facility]) [U07.1, J96.01]   Acute hypoxemic respiratory failure due to COVID-19 [facility])       Precautions: Contact (COVID; [employer])  Chart, physical therapy assessment, plan of care and goals were reviewed. ASSESSMENT  Patient continues with skilled PT services and is progressing towards goals. Communicated with nurse cleared for therapy. Patient supine on bed when received. Rolled on the edge of bed supervision, supine to sit supervision. Sit to stand CGA, ambulate without assistive device in the room and around the bed towards the door multiple times. No loss of balance steady sitting and standing balance. OOB to chair as tolerated performed some active range of motion exercise on both LE all planes. Educate and instructed patient to continue active range of motion exercise on both legs while up on chair or on bed multiple times. Recommend patient to be up on the chair at least 3 times a day every meal times as tolerated.  Reviewed purse lip breathing and energy conservation with the patient also practice incentive spirometer verbalized understanding. Activated chair alarm and communicated with nurse who agreed to monitor patient. Current Level of Function Impacting Discharge (mobility/balance): CGA with tranfers and ambulation using a rolling walker    Other factors to consider for discharge: fall         PLAN :  Patient continues to benefit from skilled intervention to address the above impairments. Continue treatment per established plan of care. to address goals. Recommendation for discharge: (in order for the patient to meet his/her long term goals)  Physical therapy at least 2 days/week in the home vs.TBD pending progress from therapy    This discharge recommendation:  Has been made in collaboration with the attending provider and/or case management    IF patient discharges home will need the following DME: patient owns DME required for discharge       SUBJECTIVE:   Patient stated [address].     OBJECTIVE DATA SUMMARY:   Critical Behavior:  Neurologic State: Alert  Orientation Level: Oriented X4        Functional Mobility Training:  Bed Mobility:  Rolling: Supervision  Supine to Sit: Supervision  Sit to Supine: Supervision  Scooting: Supervision        Transfers:  Sit to Stand: Contact guard assistance  Stand to Sit: Setup  Stand Pivot Transfers: Contact guard assistance     Bed to Chair: Contact guard assistance                    Balance:  Sitting: Intact; Without support  Standing: Impaired;  Without support  Standing - Static: Good  Standing - Dynamic : Fair  Ambulation/Gait Training:  Distance (ft): 50 Feet (ft)  Assistive Device: Gait belt  Ambulation - Level of Assistance: Contact guard assistance     Gait Description (WDL): Exceptions to WDL  Gait Abnormalities: Path deviations        Base of Support: Widened     Speed/Jadyn: Pace decreased (<100 feet/min)  Step Length: Right shortened; Left shortened                      Therapeutic Exercises:   Educate and instructed patient to continue active range of motion exercise on both legs while up on chair or on bed multiple times. Recommend patient to be up on the chair at least 3 times a day every meal times as tolerated. Pain Ratin/10    Activity Tolerance:   Good    After treatment patient left in no apparent distress:   Sitting in chair, Heels elevated for pressure relief, Call bell within reach, and Bed / chair alarm activated    COMMUNICATION/COLLABORATION:   The patients plan of care was discussed with: Occupational therapist and Registered nurse. Odalys Shelton PT,WCC.    Time Calculation: 27 mins

## 2021-11-29 NOTE — ADT AUTH CERT NOTES
[address]      FACILITY NPI :[id number]  FACILITY TAX ID :      [facility] 3 PRO CARE TELE [address]  [address] [facility]  [phone]          DEPT CONTACT:  Tim Dominguez  BX#[phone]  FLS#[phone]       Patient Name :Lauri Rehman   : 1960 (61 yrs)  MRN : [id number]     Patient Mailing Address [address] [facility] [47] , [address]                     Insurance Plan Payor: Kerry Burger / Plan: [address] [place] [address] / Product Type: PPO /      Primary Coverage Subscriber ID : [id number]     Secondary Coverage:  N/A         Current Patient Class : INPATIENT  Admit Date : 2021     REQUESTED LEVEL OF CARE: INPATIENT [101]                                                           Diagnosis : Acute hypoxemic respiratory failure due to COVID-19 [facility])                          ICD10 Code : Acute hypoxemic respiratory failure due to COVID-19 ([facility] [address] [U07.1, J96.01]     Current Room and Bed 321/01     Admitting and Attending Info:  Admitting Provider : Juan Carlos Hooker MD   NPI: [id number]  Admitting Provider Phone.  [phone]  Admitting Provider Address: [place] [address]  Attending Provider Saleem Abraham   [id number]  Attending Provider Address:  [place] [address]     Attending Provider Phone: Nba gamez phone: [phone]               Utilization Reviews         Viral Illness, Acute - Care Day 2 (2021) by Mook Medellin       Review Entered Review Status   2021 18:22 Completed      Criteria Review      Care Day: 2 Care Date: 2021 Level of Care: Telemetry    Guideline Day 2    Level Of Care    (X) Floor Clinical Status    (X) * Hypotension absent    11/27/2021 18:22:15 EST by Lennox Held      97.2, 144/70, 69, 18, 09% 4lnc    (X) * No requirement for mechanical ventilation    ( ) * Oxygenation at baseline or improved    ( ) * Mental status at baseline    11/27/2021 18:22:15 EST by Lennox Held      Alert and oriented to conversation    Activity    (X) Advance activity as tolerated    Routes    ( ) * Oral hydration    (X) Oral or IV medications    11/27/2021 18:22:15 EST by Lennox Held      vit c 500mg po x2 (bid), decadron 6mg po x1 (daily), pepcid 20mg IV x1 (q12hrs), zinc 50mg po x1 (daily), lantus 80u sc x1 (qhs), humalog 7u sc x1 10u sc x1 15u sc x1 (qid ss), humalog 8u sc x2 (tid), lasix 20mg IV x1, NS 1L bolus x1,humalog 5u sc x1    (X) Usual diet    11/27/2021 18:22:15 EST by Lennox Held      regular diet    Interventions    (X) Possible isolation    11/27/2021 18:22:15 EST by Lennox Held      dpp    (X) Pulse oximetry    11/27/2021 18:22:15 EST by Lennox Held      per unit routine    (X) Possible oxygen    * Milestone   Additional Notes   11/27:      results:    Na 133    glucose 395        lactic 2.5    bnp 240    crp 2.89        IM PN:    Subjective   Patient states he feels better today - breathing improved. Denies chest pain, palp. No abdominal pain, n/v. Continues to endorse watery diarrhea.       physical exam:    General: No acute distress. Alert. Cooperative. Obese male positioned comfortably in bed   Head: Normocephalic. Atraumatic. Respiratory: CTAB. No w/r/r/c. On 2L O2 - oxygenating b/w 91-93%. Cardiovascular: RRR. Normal S1,S2. No m/r/g.    GI: + bowel sounds. Nontender. No rebound tenderness or guarding. Protuberant abdomen   Extremities:  3+ pitting LE edema bilateral lower extremities with chronic venous stasis changes. Skin: Warm, dry. No rashes.     Neuro: Alert and oriented to conversation.       a/p:    Acute hypoxic respiratory failure 2/2 COVID pneumonia: Pt with O2 sat of 88% at time of admission. Symptom onset 11/18. Presenting symptoms acutely worsened on date of admission, prompting presentation to the ED. In the ED, given Decadron IV 10mg, 1L NS, with symptomatic improvement. Pt met 1/4 SIRS criteria on admission, and CURB-65 1/5.   - Continue supplemental O2, wean as tolerated   - RT consulted to determine home O2 needs   - PT/OT consulted   - S/p IV Decadron 10mg in ED, will continue Decadron 6mg PO daily x 10 days total   - COVID meds (Atorvastatin 40mg every day, Vitamin C 500mg BID, and Zinc 220mg every day)   - CBC with diff daily, COVID labs (CRP, D-dimer, ferritin, LD) daily       Lactic acidosis: POA 3.0 -> 2.4 -> 2.2. Likely in the setting of poor PO intake prior to admission, as well as underlying OHS/AIDEE (likely, but has not been diagnosed at this point)   - Will continue to trend lactate q4h until downtrended   - BMP daily   - Encourage PO intake   - Continue mIVF at 100cc/hr       History of DVT: On Eliquis at home. F/b Dr. [place]  etiology - not provoked per Dr. Mariah Pollack note. Denies fh/o VTE that pt is aware of. Per chart review, pt started on Xarelto, however pt reportedly taking Eliquis per medication reconciliation with wife over phone.   - Continue Eliquis 5mg BID   - Up and out of bed as tolerated       Troponinemia: Troponin of 15 -> 22. Pt without chest pain or tightness. Low concern for ACS. Per chart review, last [facility] 2007 was wnl.   - Will continue to monitor symptoms, electrolytes   - Patient on remote cardiac monitoring   - Repeat troponin ordered       Dysuria: Noted per pt. Likely 2/2 decreased UOP in setting of poor PO intake. Will initiate tx empirically for complicated UTI. UCx in past without growth - unable to determine past bacterial species pt is prone to infection from. UA now without evidence of infection, not acutely septic.  In absence of systemic symptoms or evidence of leukocytosis, likely due to decreased UOP and overall feeling ill from COVID, will consider scaling back Levaquin prior to d/c.   - Continue Levaquin 750mg IV every day and re-eval prior to d/c today   - Bladder checks and straight cath PRN       Hypokalemia: POA 3.1. S/p 40mEq PO in ED.   - BMP and Mg daily - pending this AM   - Replete as needed   - Patient on remote tele       HLD: Last lipid panel 08/2020 with , HDL 43, LDL 99, . On Rosuvastatin 20mg daily at home. - Will substitute with Atorvastatin 40mg daily here       T2DM: Last A1c 7.9% in 09/2020. On Lantus 100u QHS, SSI Lispro, Metformin 1000mg BID at home. Will switch to 80% of home dose while admitted, likely hyperglycemia as pt on only 80% of home dose here. - Lantus 80u QHS    - SSI   - POC BG ACHS   - Hypoglycemia protocols ordered       HTN: POA /68. On Metoprolol XL 50mg daily, Diltiazem 240mg daily, and HCTZ 25mg daily. - Will continue BP meds   - Monitor and adjust as required       Chronic lymphedema: Chronic venous stasis and 3+ pitting edema noted on exam. Pt states he uses compression stockings at home. - Compression stockings ordered   - Elevate lower extremities           covid 19 by Jayjay Moctezuma RN       Review Entered Review Status   11/27/2021 15:20 In Primary      Criteria Review   Is the illness suspected to be related to the Coronavirus (COVID-19)? Yes, No or Other  Has the member been tested for the COVID-19? Yes      If Yes, what are the results of the COVID-19? Positive,   What is the severity of the members condition (i.e. Isolation, Ventilator use)?    022lnc  Isolation            Viral Illness, Acute - Care Day 1 (11/26/2021) by Jayjay Moctezuma RN       Review Entered Review Status   11/27/2021 15:17 Completed      Criteria Review      Care Day: 1 Care Date: 11/26/2021 Level of Care: Telemetry    Guideline Day 1    Clinical Status    (X) * Clinical Indications met    11/27/2021 15:17:53 EST by Jayjay Moctezuma   O2 saturation was 88% on room air. Activity    (X) Activity as tolerated    11/27/2021 15:17:53 EST by [employer]      activity as tolerated with assist    Routes    (X) Oral or IV hydration    11/27/2021 15:17:53 EST by [employer]      NS @100cc/hr. (X) Parenteral or oral medications    11/27/2021 15:17:53 EST by [employer]      pepcid  20mg iv  q12  protonix 40mg iv  qday  zinc 50mg(220mg)  po  qday  vitamin  C 500mg po  bid. (X) Liquid or usual diet    11/27/2021 15:17:53 EST by [employer]      regular  3carb choices  45gm /meal no conc sweets    Interventions    (X) Possible isolation    11/27/2021 15:17:53 EST by [employer]      droplet plus    (X) Possible oxygen    11/27/2021 15:17:53 EST by Hadley Antony 022lnc. Medications    (X) Possible antiviral medication    (X) Possible antibiotic (eg, for bacterial coinfection or superinfection)    11/27/2021 15:17:53 EST by [employer]      levoquin  750mg iv  q24    (X) Possible DVT prophylaxis    11/27/2021 15:17:53 EST by [employer]      eliquis 5mg po bid. * Milestone   Additional Notes   11/26/21    Inpatient          a 64 y.o. unvaccinated male with PMHx of HTN, DM2, HLD, chronic lymphedema, history of right femoral DVT in 2020 now on [facility], who presents to the ED complaining of shortness of breath, cough, and decreased oral intake. He states that he tested positive for COVID on 11/18/21, and symptom onset was 11/18/21. . Since onset, he has experienced cough with productive clear sputum, nasal congestion, poor PO intake, nausea (without vomiting), watery diarrhea (non-bloody, not dark), and palpitations on rising from seated position and with any degree of exertion. Had Tmax of 101-102 yesterday evening. Also with dysuria and urinary urgency/frequency. He was having markedly worsened shortness of breath today, which prompted call to emergency services today. Ed course:    For the past week he has had shortness of breath, cough and nasuea.  He has not had vomiting but has had decreased appetite.  His wife called his PCP who advised he come to the ED so she called 911.  When EMS arrived his O2 saturation was 88% on room air.      94% on 2L   Lactic acid 3.0        Vitals: Temp 99.2   /68   HR 78   RR 30   SatO2  93% on 2L   Labs: CBC (WBC 4.6, Hb 13.5, ), CMP (Na 134, K 3.1, BUN/Cr 20/1.23), D-dimer 0.33, lactic acid 3.0, procal 0.07, hsTrop 15, proBNP 45   Imaging: CXR showing b/l multifocal airspace disease   Treatment: Dexamethasone 10mg IV, Klor-con 40 mEq po  , 1L NS       EKG: NSR at 80 bpm, normal intervals, normal axis. QTc 380ms      Physical Exam   General: No acute distress. Alert. Cooperative. Elderly male lying comfortably on stretcher. Head: Normocephalic. Atraumatic. Respiratory: Bibasilar crackles. No rhonchorous breath sounds or rales. Cardiovascular: RRR. Normal S1,S2. No m/r/g.    GI: + bowel sounds. Nontender. No rebound tenderness or guarding. Protuberant abdomen. Extremities: 3+ bilateral pitting LE edema with chronic venous stasis changes   Musculoskeletal: Full ROM in all extremities. Slow with movement 2/2 difficulty with respiration. Negative CVAT   Skin: Warm, dry. No rashes. Neuro: Alert and oriented to conversation      Plan      Acute hypoxic respiratory failure 2/2 COVID pneumonia: Pt with O2 sat of 88% at time of admission. Symptom onset 11/18. Presenting symptoms acutely worsened on date of admission, prompting presentation to the ED. In the ED, given Decadron IV 10mg, 1L NS, with symptomatic improvement.  Pt met 1/4 SIRS criteria on admission, and CURB-65 1/5.   - Admit to remote tele   - Vital signs per unit   - O2 via NC, wean as tolerated   - S/p IV Decadron 10mg in ED, will continue Decadron 5mg PO every day   - COVID meds (Atorvastatin 40mg every day, Vitamin C 500mg BID, and Zinc 220mg every day)   - CBC with diff daily, COVID labs (CRP, D-dimer, ferritin, LD) daily   - Will consider repeat CXR as indicated   - RT consulted to determine home O2 needs   - PT/OT consulted       Lactic acidosis: POA 3.0. Likely in the setting of poor PO intake prior to admission, as well as underlying OHS/AIDEE (likely, but has not been diagnosed at this point)   - Will trend lactate q4h until downtrended   - BMP daily   - S/p 1L NS in the ED   - Continue mIVF at 100cc/hr       History of DVT: On Eliquis at home. F/b Dr. Garcia Frazier. Unclear etiology - not provoked per Dr. David Zuniga note. Denies fh/o VTE that pt is aware of. Per chart review, pt started on Xarelto, however pt reportedly taking Eliquis per medication reconciliation with wife over phone.   - Continue Eliquis 5mg BID   - Monitor for symptoms, pt anticoagulated but now at increased risk of VTE given COVID infection   - Up and out of bed as tolerated           Viral Illness, Acute - Clinical Indications for Admission to Inpatient Care by Jakub Griffiths RN       Review Entered Review Status   11/27/2021 15:13 Completed      Criteria Review      Clinical Indications for Admission to Inpatient Care    Most Recent : Jakub Griffiths Most Recent Date: 11/27/2021 15:13:20 EST    (X) Admission is indicated for  1 or more  of the following  [A] [B] (1) (2) (3) (4) (5) (6) (7)    (8) (9):       (X) Pulmonary manifestation, as indicated by  1 or more  of the following :          (X) Hypoxemia          11/27/2021 15:13:20 EST by Jakub Griffiths            O2 saturation was 88% on room air. H&P Notes       H&P by Stephanie Barba MD at 11/26/21 1726 documented on ED to Hosp-Admission (Current) from 11/26/2021 in [facility] 2    Author: Stephanie Barba MD Author Type: Resident Filed: 11/26/21 1931   Note Status: Attested Addendum Cosign: Cosigned by Lindsay Mcpherson MD at 11/27/21 0796 Date of Service: 11/26/21 1726   : Stephanie Barba MD (Resident)       Prior Versions: 1.  Stephanie Barba MD (Resident) at 11/26/21 1929 - Cosign Needed Addendum    2. Shira Paul MD (Resident) at 11/26/21 1914 - Cosign Needed Addendum    3. Shira Paul MD (Resident) at 11/26/21 1910 - Cosign Needed Addendum    4. Shira Paul MD (Resident) at 11/26/21 1908 - Cosign Needed Addendum    5. Shira Paul MD (Resident) at 11/26/21 1903 - Cosign Needed Addendum    6. Shira Paul MD (Resident) at 11/26/21 1901 - Cosign Needed    7. Aden Carmona MD (Resident) at 11/26/21 1802 - Shared    8. Aden Carmona MD (Resident) at 11/26/21 1758 - Shared    9. Shira Paul MD (Resident) at 11/26/21 1754 - Shared   Attestation signed by James La MD at 11/27/21 7790 0625 [facility] Residency Attending Addendum:  I saw and evaluated the patient on 11/27/2021, performing the key elements of the service. I discussed the findings, assessment and plan with the resident and agree with the resident's findings and plan as documented in the resident's note.         [place] [address]   Office [phone]  Fax [phone]         Admission H&P      Name: Viktor Benavides MRN: [id number]  Sex: Male   YOB: 1960  Age: 64 y.o. PCP: Rocio Rodas DO      Source of Information: patient, medical records     Chief complaint: shortness of breath and cough     History of Present Illness  Viktor Benavides is a 64 y.o. unvaccinated male with PMHx of HTN, DM2, HLD, chronic lymphedema, history of right femoral DVT in 2020 now on [facility], who presents to the ED complaining of shortness of breath, cough, and decreased oral intake. He states that he tested positive for COVID on 11/18/21, and symptom onset was 11/18/21. Was at a [employer] Thanksgiving dinner prior to onset of symptoms and was later notified that 25 of the couples present at the dinner had tested positive for COVID.  Therefore, when he began experiencing shortness of breath, he and his wife performed home COVID tests which were positive, then re-tested at [facility] on 11/19, where PCR and rapid COVID tests were reportedly positive as well. Since onset, he has experienced cough with productive clear sputum, nasal congestion, poor PO intake, nausea (without vomiting), watery diarrhea (non-bloody, not dark), and palpitations on rising from seated position and with any degree of exertion. Had Tmax of 101-102 yesterday evening. Also with dysuria and urinary urgency/frequency. He was having markedly worsened shortness of breath today, which prompted call to emergency services today.     COVID Questions: Tested positive for COVID, patient is unvaccinated.        In the ED:  Vitals: Temp 99.2   /68   HR 78   RR 30   SatO2  93% on 2L  Labs: CBC (WBC 4.6, Hb 13.5, ), CMP (Na 134, K 3.1, BUN/Cr 20/1.23), D-dimer 0.33, lactic acid 3.0, procal 0.07, hsTrop 15, proBNP 45  Imaging: CXR showing b/l multifocal airspace disease  Treatment: Dexamethasone 10mg IV, Klor-con 40 mEq, 1L NS     EKG: NSR at 80 bpm, normal intervals, normal axis. QTc 380ms     Patient Vitals for the past 12 hrs:    Temp Pulse Resp BP SpO2   11/26/21 1700  78 28 139/60 91 %   11/26/21 1620     91 %   11/26/21 1454 99.2 °F (37.3 °C) 78 30 (!) 159/68 93 %         Review of Systems  Review of Systems   Constitutional: Positive for activity change, appetite change, chills and fever. HENT: Positive for congestion and rhinorrhea. Respiratory: Positive for cough, chest tightness and shortness of breath. Cardiovascular: Positive for leg swelling. Negative for chest pain and palpitations. Gastrointestinal: Positive for diarrhea and nausea. Negative for constipation and vomiting. Genitourinary: Positive for decreased urine volume, dysuria, frequency and urgency. Hematological: Bruises/bleeds easily.         Home Medications           Prior to Admission medications    Medication Sig Start Date End Date Taking?  Authorizing Provider ondansetron (ZOFRAN ODT) 4 mg disintegrating tablet Take 1 Tablet by mouth every eight (8) hours as needed for Nausea or Vomiting. 11/23/21     Shamir Lockett DO   triamcinolone acetonide (KENALOG) 0.1 % topical cream Apply  to affected area three (3) times daily as needed for Skin Irritation. use thin layer 8/6/21     Shamir Lockett DO   atorvastatin (LIPITOR) 40 mg tablet   2/15/21     Provider, Historical   rivaroxaban (Xarelto) 20 mg tab tablet Take 1 Tab by mouth daily. Indications: blood clot in a deep vein of the extremities  Patient not taking: Reported on 8/6/2021 3/4/21     Charlotte Arceo MD   metoprolol succinate (TOPROL-XL) 50 mg XL tablet Take 50 mg by mouth daily.       Provider, Historical   insulin lispro (HUMALOG) 100 unit/mL kwikpen by SubCUTAneous route Before breakfast, lunch, and dinner. Sliding scale       Provider, Historical   dilTIAZem ER (CARDIZEM LA) 240 mg Tb24 tablet Take 240 mg by mouth daily.       Provider, Historical   hydroCHLOROthiazide (HYDRODIURIL) 25 mg tablet Take 25 mg by mouth daily.       Provider, Historical   rosuvastatin (CRESTOR) 20 mg tablet Take 20 mg by mouth nightly. Patient not taking: Reported on 8/6/2021       Provider, Historical   aspirin delayed-release 81 mg tablet Take 81 mg by mouth daily. Patient not taking: Reported on 8/6/2021       Provider, Historical   insulin glargine (LANTUS,BASAGLAR) 100 unit/mL (3 mL) inpn 100 Units by SubCUTAneous route daily. 8/6/19     Shamir Lockett DO   metFORMIN (GLUCOPHAGE) 1,000 mg tablet Take 1,000 mg by mouth two (2) times daily (with meals).        Provider, Historical         Allergies  No Known Allergies     Past Medical History       Past Medical History:   Diagnosis Date    AR (allergic rhinitis) 6/11/2009    Diabetes [facility])       Dr. Jenifer Evangelista    History of vascular access device 08/18/2020     4f single picc placed on r cephalic, 63XQ at 3cm out placed by CARMEN Lowry, difficult limited access    Hypercholesterolemia      Hypertension           Previous Hospitalization(s)        Past Surgical History:   Procedure Laterality Date    HX HEART CATHETERIZATION        normal         Family History        Family History   Problem Relation Age of Onset    Heart Failure Father      Diabetes Brother      Heart Failure Paternal Grandfather            age 37 AMI         Social History  Alcohol history: Not at all  Smoking history: Former smoker, smoked 1 ppd x 15 years, quit 31 years ago  Illicit drug history: Not at all  Living arrangement: patient lives with their spouse. Ambulates: Independently      Vital Signs  Visit Vitals  /60   Pulse 78   Temp 99.2 °F (37.3 °C)   Resp 28   Ht 6' 2\" (1.88 m)   Wt (!) 375 lb (170.1 kg)   SpO2 91%   BMI 48.15 kg/m²         Physical Exam  General: No acute distress. Alert. Cooperative. Elderly male lying comfortably on stretcher. Head: Normocephalic. Atraumatic. Respiratory: Bibasilar crackles. No rhonchorous breath sounds or rales. Cardiovascular: RRR. Normal S1,S2. No m/r/g.    GI: + bowel sounds. Nontender. No rebound tenderness or guarding. Protuberant abdomen. Extremities: 3+ bilateral pitting LE edema with chronic venous stasis changes   Musculoskeletal: Full ROM in all extremities. Slow with movement 2/2 difficulty with respiration. Negative CVAT   Skin: Warm, dry. No rashes.     Neuro: Alert and oriented to conversation         Laboratory Data  Recent Results          Recent Results (from the past 8 hour(s))   EKG, 12 LEAD, INITIAL     Collection Time: 21  3:18 PM   Result Value Ref Range     Ventricular Rate 80 BPM     Atrial Rate 80 BPM     P-R Interval 178 ms     QRS Duration 76 ms     Q-T Interval 330 ms     QTC Calculation (Bezet) 380 ms     Calculated P Axis 25 degrees     Calculated R Axis 14 degrees     Calculated T Axis 73 degrees     Diagnosis           Normal sinus rhythm  Nonspecific ST and T wave abnormality  Abnormal ECG  When compared with ECG of 15-AUG-2020 03:35,  Nonspecific T wave abnormality, worse in Inferior leads  T wave inversion now evident in Anterior leads  QT has shortened      CBC WITH AUTOMATED DIFF     Collection Time: 11/26/21  3:34 PM   Result Value Ref Range     WBC 4.6 4.1 - 11.1 K/uL     RBC 4.64 4.10 - 5.70 M/uL     HGB 13.5 12.1 - 17.0 g/dL     HCT 40.4 36.6 - 50.3 %     MCV 87.1 80.0 - 99.0 FL     MCH 29.1 26.0 - 34.0 PG     MCHC 33.4 30.0 - 36.5 g/dL     RDW 15.8 (H) 11.5 - 14.5 %     PLATELET 795 760 - 556 K/uL     MPV 10.5 8.9 - 12.9 FL     NRBC 0.0 0  WBC     ABSOLUTE NRBC 0.00 0.00 - 0.01 K/uL     NEUTROPHILS 75 32 - 75 %     BAND NEUTROPHILS 1 0 - 6 %     LYMPHOCYTES 15 12 - 49 %     MONOCYTES 9 5 - 13 %     EOSINOPHILS 0 0 - 7 %     BASOPHILS 0 0 - 1 %     IMMATURE GRANULOCYTES 0 %     ABS. NEUTROPHILS 3.5 1.8 - 8.0 K/UL     ABS. LYMPHOCYTES 0.7 (L) 0.8 - 3.5 K/UL     ABS. MONOCYTES 0.4 0.0 - 1.0 K/UL     ABS. EOSINOPHILS 0.0 0.0 - 0.4 K/UL     ABS. BASOPHILS 0.0 0.0 - 0.1 K/UL     ABS. IMM. GRANS. 0.0 K/UL     DF MANUAL       PLATELET COMMENTS Large Platelets       RBC COMMENTS NORMOCYTIC, NORMOCHROMIC     METABOLIC PANEL, COMPREHENSIVE     Collection Time: 11/26/21  3:34 PM   Result Value Ref Range     Sodium 134 (L) 136 - 145 mmol/L     Potassium 3.1 (L) 3.5 - 5.1 mmol/L     Chloride 97 97 - 108 mmol/L     CO2 30 21 - 32 mmol/L     Anion gap 7 5 - 15 mmol/L     Glucose 201 (H) 65 - 100 mg/dL     BUN 20 6 - 20 MG/DL     Creatinine 1.23 0.70 - 1.30 MG/DL     BUN/Creatinine ratio 16 12 - 20       GFR est AA >60 >60 ml/min/1.73m2     GFR est non-AA 60 (L) >60 ml/min/1.73m2     Calcium 8.5 8.5 - 10.1 MG/DL     Bilirubin, total 0.8 0.2 - 1.0 MG/DL     ALT (SGPT) 42 12 - 78 U/L     AST (SGOT) 38 (H) 15 - 37 U/L     Alk.  phosphatase 56 45 - 117 U/L     Protein, total 7.1 6.4 - 8.2 g/dL     Albumin 3.0 (L) 3.5 - 5.0 g/dL     Globulin 4.1 (H) 2.0 - 4.0 g/dL     A-G Ratio 0.7 (L) 1.1 - 2.2     PROCALCITONIN   Collection Time: 11/26/21  3:34 PM   Result Value Ref Range     Procalcitonin 0.07 ng/mL   TROPONIN-HIGH SENSITIVITY     Collection Time: 11/26/21  3:34 PM   Result Value Ref Range     Troponin-High Sensitivity 15 0 - 76 ng/L   SAMPLES BEING HELD     Collection Time: 11/26/21  3:34 PM   Result Value Ref Range     SAMPLES BEING HELD 1SST       COMMENT           Add-on orders for these samples will be processed based on acceptable specimen integrity and analyte stability, which may vary by analyte. NT-PRO BNP     Collection Time: 11/26/21  3:34 PM   Result Value Ref Range     NT pro-BNP 45 <125 PG/ML   MAGNESIUM     Collection Time: 11/26/21  3:34 PM   Result Value Ref Range     Magnesium 1.9 1.6 - 2.4 mg/dL   LACTIC ACID     Collection Time: 11/26/21  3:34 PM   Result Value Ref Range     Lactic acid 3.0 (HH) 0.4 - 2.0 MMOL/L   D DIMER     Collection Time: 11/26/21  3:34 PM   Result Value Ref Range     D-dimer 0.33 0.00 - 0.65 mg/L FEU   COVID-19 RAPID TEST     Collection Time: 11/26/21  4:19 PM   Result Value Ref Range     Specimen source Nasopharyngeal       COVID-19 rapid test Detected (AA) NOTD              Imaging          CXR Results  (Last 48 hours)                 11/26/21 1648   XR CHEST PORT Final result     Impression:   Bilateral multifocal airspace disease.            Narrative:   INDICATION:  COVID, hypoxia        COMPARISON: August 2020       FINDINGS: Single AP portable view of the chest obtained at 1645 demonstrates a   stable cardiomediastinal silhouette. There is chronic cardiomegaly. The lungs   are hypoinspiratory and the patient is morbidly obese.  There is bilateral   multifocal airspace disease, right perihilar and left lung base.                     CT Results  (Last 48 hours)     None                Assessment and Plan      Talya Garcia is a 64 y.o. male with a PMHx of HTN, DM2, HLD, chronic lymphedema, history of right femoral DVT in 2020 now on [facility] who is admitted for acute hypoxic respiratory failure 2/2 COVID PNA.     Acute hypoxic respiratory failure 2/2 COVID pneumonia: Pt with O2 sat of 88% at time of admission. Symptom onset 11/18. Presenting symptoms acutely worsened on date of admission, prompting presentation to the ED. In the ED, given Decadron IV 10mg, 1L NS, with symptomatic improvement. Pt met 1/4 SIRS criteria on admission, and CURB-65 1/5.  - Admit to remote tele  - Vital signs per unit  - O2 via NC, wean as tolerated  - S/p IV Decadron 10mg in ED, will continue Decadron 5mg PO every day  - COVID meds (Atorvastatin 40mg every day, Vitamin C 500mg BID, and Zinc 220mg every day)  - CBC with diff daily, COVID labs (CRP, D-dimer, ferritin, LD) daily  - Will consider repeat CXR as indicated  - RT consulted to determine home O2 needs  - PT/OT consulted     Lactic acidosis: POA 3.0. Likely in the setting of poor PO intake prior to admission, as well as underlying OHS/AIDEE (likely, but has not been diagnosed at this point)  - Will trend lactate q4h until downtrended  - BMP daily  - S/p 1L NS in the ED  - Continue mIVF at 100cc/hr     History of DVT: On Eliquis at home. F/b Dr. England Shown. Unclear etiology - not provoked per Dr. Granado Running note. Denies fh/o VTE that pt is aware of. Per chart review, pt started on Xarelto, however pt reportedly taking Eliquis per medication reconciliation with wife over phone.  - Continue Eliquis 5mg BID  - Monitor for symptoms, pt anticoagulated but now at increased risk of VTE given COVID infection  - Up and out of bed as tolerated     Troponinemia: POA troponin of 15. Pt without chest pain or tightness. Low concern for ACS. Per chart review, last [facility] 2007 was wnl.  - Will continue to monitor symptoms, electrolytes  - Patient on remote cardiac monitoring  - Repeat troponin ordered     Dysuria: Noted per pt. Not septic. Can consider Cefepime IV is pt does not improve. Potentially 2/2 decreased UOP in setting of poor PO intake.  Will initiate tx empirically for complicated UTI. UCx in past without growth - unable to determine past bacterial species pt is prone to infection from. - Levaquin 750mg IV every day (will plan for 14 day course total)  - UA with reflex cx  - Bladder checks and straight cath PRN     Hypokalemia: POA 3.1. S/p 40mEq PO in ED.  - BMP and Mg daily  - Replete as needed  - Patient on remote tele     HLD: Last lipid panel 08/2020 with , HDL 43, LDL 99, . On Rosuvastatin 20mg daily at home. - Will substitute with Atorvastatin 40mg daily here     T2DM: Last A1c 7.9% in 09/2020. On Lantus 100u QHS, SSI Lispro, Metformin 1000mg BID at home. Will switch to 80% of home dose while admitted. - Lantus 80u QHS   - SSI  - POC BG ACHS  - Hypoglycemia protocols ordered     HTN: POA /68. On Metoprolol XL 50mg daily, Diltiazem 240mg daily, and HCTZ 25mg daily. - Will continue BP meds  - Monitor and adjust as required     Chronic lymphedema: Chronic venous stasis and 3+ pitting edema noted on exam. Pt states he uses compression stockings at home. - Compression stockings ordered     BPH: Chronic, stable. - Continue Alfuzosin 10mg daily - pharm to substitute     Obesity:  - The pt's Body mass index is 48.15 kg/m². - Encouraging lifestyle modifications and further follow up outpatient.        FEN/GI - Diabetic diet. NS at 100 mL/hr. Activity - Out of bed with assistance  DVT prophylaxis - Eliquis  GI prophylaxis - Pepcid 20 BID  Fall prophylaxis - Fall precautions ordered. Disposition - Admit to Remote Telemetry. Plan to d/c to Home. Consulting PT, OT and CM  Code Status - Partial. Discussed with patient / caregivers.   Next of Kin Name and Krish Schroeder 23 Singh Boss - [phone]     Patient Ivan Ray will be discussed with Dr. Sherrell Coe.     5:26 PM, 11/26/21  Pawel Carrasquillo MD  Family Medicine Resident          For Billing         Chief Complaint   Patient presents with    Positive For [address] Problems  Date Reviewed: 8/6/2021     None

## 2021-11-30 LAB
ANION GAP SERPL CALC-SCNC: 8 MMOL/L (ref 5–15)
BASOPHILS # BLD: 0 K/UL (ref 0–0.1)
BASOPHILS NFR BLD: 0 % (ref 0–1)
BUN SERPL-MCNC: 28 MG/DL (ref 6–20)
BUN/CREAT SERPL: 28 (ref 12–20)
CALCIUM SERPL-MCNC: 8.6 MG/DL (ref 8.5–10.1)
CHLORIDE SERPL-SCNC: 100 MMOL/L (ref 97–108)
CO2 SERPL-SCNC: 27 MMOL/L (ref 21–32)
CREAT SERPL-MCNC: 1 MG/DL (ref 0.7–1.3)
CRP SERPL-MCNC: 2.61 MG/DL (ref 0–0.6)
D DIMER PPP FEU-MCNC: 0.53 MG/L FEU (ref 0–0.65)
DIFFERENTIAL METHOD BLD: ABNORMAL
EOSINOPHIL # BLD: 0 K/UL (ref 0–0.4)
EOSINOPHIL NFR BLD: 0 % (ref 0–7)
ERYTHROCYTE [DISTWIDTH] IN BLOOD BY AUTOMATED COUNT: 14.9 % (ref 11.5–14.5)
FERRITIN SERPL-MCNC: 546 NG/ML (ref 26–388)
GLUCOSE BLD STRIP.AUTO-MCNC: 344 MG/DL (ref 65–117)
GLUCOSE BLD STRIP.AUTO-MCNC: 352 MG/DL (ref 65–117)
GLUCOSE BLD STRIP.AUTO-MCNC: 420 MG/DL (ref 65–117)
GLUCOSE BLD STRIP.AUTO-MCNC: 513 MG/DL (ref 65–117)
GLUCOSE SERPL-MCNC: 367 MG/DL (ref 65–100)
HCT VFR BLD AUTO: 38.6 % (ref 36.6–50.3)
HGB BLD-MCNC: 12.9 G/DL (ref 12.1–17)
IMM GRANULOCYTES # BLD AUTO: 0 K/UL
IMM GRANULOCYTES NFR BLD AUTO: 0 %
LDH SERPL L TO P-CCNC: 340 U/L (ref 85–241)
LYMPHOCYTES # BLD: 1.2 K/UL (ref 0.8–3.5)
LYMPHOCYTES NFR BLD: 14 % (ref 12–49)
MAGNESIUM SERPL-MCNC: 2.6 MG/DL (ref 1.6–2.4)
MCH RBC QN AUTO: 28.8 PG (ref 26–34)
MCHC RBC AUTO-ENTMCNC: 33.4 G/DL (ref 30–36.5)
MCV RBC AUTO: 86.2 FL (ref 80–99)
MONOCYTES # BLD: 0.7 K/UL (ref 0–1)
MONOCYTES NFR BLD: 8 % (ref 5–13)
NEUTS SEG # BLD: 7 K/UL (ref 1.8–8)
NEUTS SEG NFR BLD: 78 % (ref 32–75)
NRBC # BLD: 0 K/UL (ref 0–0.01)
NRBC BLD-RTO: 0 PER 100 WBC
PLATELET # BLD AUTO: 315 K/UL (ref 150–400)
PMV BLD AUTO: 10.4 FL (ref 8.9–12.9)
POTASSIUM SERPL-SCNC: 3.6 MMOL/L (ref 3.5–5.1)
RBC # BLD AUTO: 4.48 M/UL (ref 4.1–5.7)
RBC MORPH BLD: ABNORMAL
SERVICE CMNT-IMP: ABNORMAL
SODIUM SERPL-SCNC: 135 MMOL/L (ref 136–145)
WBC # BLD AUTO: 8.9 K/UL (ref 4.1–11.1)
WBC MORPH BLD: ABNORMAL

## 2021-11-30 PROCEDURE — 74011636637 HC RX REV CODE- 636/637: Performed by: STUDENT IN AN ORGANIZED HEALTH CARE EDUCATION/TRAINING PROGRAM

## 2021-11-30 PROCEDURE — 97116 GAIT TRAINING THERAPY: CPT

## 2021-11-30 PROCEDURE — 99233 SBSQ HOSP IP/OBS HIGH 50: CPT | Performed by: FAMILY MEDICINE

## 2021-11-30 PROCEDURE — 74011250637 HC RX REV CODE- 250/637: Performed by: INTERNAL MEDICINE

## 2021-11-30 PROCEDURE — 94760 N-INVAS EAR/PLS OXIMETRY 1: CPT

## 2021-11-30 PROCEDURE — 83735 ASSAY OF MAGNESIUM: CPT

## 2021-11-30 PROCEDURE — 85379 FIBRIN DEGRADATION QUANT: CPT

## 2021-11-30 PROCEDURE — 77010033711 HC HIGH FLOW OXYGEN

## 2021-11-30 PROCEDURE — 74011250637 HC RX REV CODE- 250/637: Performed by: STUDENT IN AN ORGANIZED HEALTH CARE EDUCATION/TRAINING PROGRAM

## 2021-11-30 PROCEDURE — 36415 COLL VENOUS BLD VENIPUNCTURE: CPT

## 2021-11-30 PROCEDURE — 85025 COMPLETE CBC W/AUTO DIFF WBC: CPT

## 2021-11-30 PROCEDURE — 97164 PT RE-EVAL EST PLAN CARE: CPT

## 2021-11-30 PROCEDURE — 65660000000 HC RM CCU STEPDOWN

## 2021-11-30 PROCEDURE — 99291 CRITICAL CARE FIRST HOUR: CPT | Performed by: FAMILY MEDICINE

## 2021-11-30 PROCEDURE — 80048 BASIC METABOLIC PNL TOTAL CA: CPT

## 2021-11-30 PROCEDURE — 82728 ASSAY OF FERRITIN: CPT

## 2021-11-30 PROCEDURE — 97535 SELF CARE MNGMENT TRAINING: CPT | Performed by: OCCUPATIONAL THERAPIST

## 2021-11-30 PROCEDURE — 97530 THERAPEUTIC ACTIVITIES: CPT | Performed by: OCCUPATIONAL THERAPIST

## 2021-11-30 PROCEDURE — 86140 C-REACTIVE PROTEIN: CPT

## 2021-11-30 PROCEDURE — 74011250636 HC RX REV CODE- 250/636: Performed by: STUDENT IN AN ORGANIZED HEALTH CARE EDUCATION/TRAINING PROGRAM

## 2021-11-30 PROCEDURE — 82962 GLUCOSE BLOOD TEST: CPT

## 2021-11-30 PROCEDURE — 83615 LACTATE (LD) (LDH) ENZYME: CPT

## 2021-11-30 RX ORDER — INSULIN GLARGINE 100 [IU]/ML
50 INJECTION, SOLUTION SUBCUTANEOUS DAILY
Status: DISCONTINUED | OUTPATIENT
Start: 2021-12-01 | End: 2021-12-05 | Stop reason: HOSPADM

## 2021-11-30 RX ORDER — INSULIN GLARGINE 100 [IU]/ML
50 INJECTION, SOLUTION SUBCUTANEOUS 2 TIMES DAILY
Status: DISCONTINUED | OUTPATIENT
Start: 2021-11-30 | End: 2021-11-30

## 2021-11-30 RX ORDER — INSULIN LISPRO 100 [IU]/ML
30 INJECTION, SOLUTION INTRAVENOUS; SUBCUTANEOUS
Status: DISCONTINUED | OUTPATIENT
Start: 2021-11-30 | End: 2021-12-02

## 2021-11-30 RX ORDER — INSULIN GLARGINE 100 [IU]/ML
100 INJECTION, SOLUTION SUBCUTANEOUS 2 TIMES DAILY
Status: DISCONTINUED | OUTPATIENT
Start: 2021-11-30 | End: 2021-11-30

## 2021-11-30 RX ORDER — DEXAMETHASONE SODIUM PHOSPHATE 4 MG/ML
10 INJECTION, SOLUTION INTRA-ARTICULAR; INTRALESIONAL; INTRAMUSCULAR; INTRAVENOUS; SOFT TISSUE EVERY 24 HOURS
Status: DISCONTINUED | OUTPATIENT
Start: 2021-12-01 | End: 2021-12-02

## 2021-11-30 RX ADMIN — Medication 10 ML: at 21:17

## 2021-11-30 RX ADMIN — INSULIN GLARGINE 50 UNITS: 100 INJECTION, SOLUTION SUBCUTANEOUS at 08:47

## 2021-11-30 RX ADMIN — FAMOTIDINE 20 MG: 20 TABLET, FILM COATED ORAL at 08:45

## 2021-11-30 RX ADMIN — BARICITINIB 4 MG: 2 TABLET, FILM COATED ORAL at 08:45

## 2021-11-30 RX ADMIN — TAMSULOSIN HYDROCHLORIDE 0.4 MG: 0.4 CAPSULE ORAL at 08:44

## 2021-11-30 RX ADMIN — Medication 1 CAPSULE: at 08:45

## 2021-11-30 RX ADMIN — INSULIN LISPRO 20 UNITS: 100 INJECTION, SOLUTION INTRAVENOUS; SUBCUTANEOUS at 11:47

## 2021-11-30 RX ADMIN — APIXABAN 5 MG: 5 TABLET, FILM COATED ORAL at 08:44

## 2021-11-30 RX ADMIN — INSULIN LISPRO 20 UNITS: 100 INJECTION, SOLUTION INTRAVENOUS; SUBCUTANEOUS at 17:26

## 2021-11-30 RX ADMIN — OXYCODONE HYDROCHLORIDE AND ACETAMINOPHEN 500 MG: 500 TABLET ORAL at 08:45

## 2021-11-30 RX ADMIN — FUROSEMIDE 20 MG: 10 INJECTION, SOLUTION INTRAMUSCULAR; INTRAVENOUS at 08:44

## 2021-11-30 RX ADMIN — APIXABAN 5 MG: 5 TABLET, FILM COATED ORAL at 17:27

## 2021-11-30 RX ADMIN — DEXAMETHASONE 10 MG: 4 TABLET ORAL at 08:44

## 2021-11-30 RX ADMIN — Medication 10 ML: at 17:27

## 2021-11-30 RX ADMIN — DILTIAZEM HYDROCHLORIDE 240 MG: 240 CAPSULE, COATED, EXTENDED RELEASE ORAL at 08:44

## 2021-11-30 RX ADMIN — INSULIN LISPRO 20 UNITS: 100 INJECTION, SOLUTION INTRAVENOUS; SUBCUTANEOUS at 08:46

## 2021-11-30 RX ADMIN — OXYCODONE HYDROCHLORIDE AND ACETAMINOPHEN 500 MG: 500 TABLET ORAL at 17:27

## 2021-11-30 RX ADMIN — ATORVASTATIN CALCIUM 40 MG: 20 TABLET, FILM COATED ORAL at 08:45

## 2021-11-30 RX ADMIN — INSULIN LISPRO 30 UNITS: 100 INJECTION, SOLUTION INTRAVENOUS; SUBCUTANEOUS at 17:27

## 2021-11-30 RX ADMIN — INSULIN LISPRO 15 UNITS: 100 INJECTION, SOLUTION INTRAVENOUS; SUBCUTANEOUS at 21:16

## 2021-11-30 RX ADMIN — INSULIN LISPRO 20 UNITS: 100 INJECTION, SOLUTION INTRAVENOUS; SUBCUTANEOUS at 11:46

## 2021-11-30 RX ADMIN — FAMOTIDINE 20 MG: 20 TABLET, FILM COATED ORAL at 17:27

## 2021-11-30 RX ADMIN — HYDROCHLOROTHIAZIDE 25 MG: 25 TABLET ORAL at 08:45

## 2021-11-30 RX ADMIN — Medication 10 ML: at 05:44

## 2021-11-30 NOTE — DIABETES MGMT
This is an unvaccinated male with a history of  morbid obesity and Type 2 diabetes followed by Dr Heidi Contreras on Lantus and metformin admitted with Dx of Covid pneumonia. Presentation on admission low grade temperature, 88% O2 RA, Bilateral air space disease on CXR. Pt admitted and treated per protocol including dexamethasone. Hospital course characterized by progressive SOB with increasing O2 requirements. Regarding his diabetes he has been treated with Lantus 100 units daily plus full dose metformin at home with admission A1c of 7.9%. Since admission his blood sugars have ranged from 300-500. Currently on Lantus 100 units plus lispro 20 units with meals (45 gm carb-controlled diet) and correction. Total insulin dose last 24 hours 240 units. Examination  /60  Pulse 60  AF  97% on 35 L  Weight 169 kg    Labs  Creat 1.0  CRP 2.61  Glucose 367  AG 8    Impression  1. Covid pneumonia  2. Type 2 diabetes with marked hyperglycemia related to steroid treatment  3. Morbid obesity    Recommendation  1. Lantus 100 units daily as basal insulin  2. NPH 60 units (0.4 units/kg) now and  for every 10 mg dose of dexamthasone (please link)  3.  Increase mealtime insulin to 30 units TID ac     Total time 35 minutes

## 2021-11-30 NOTE — PROGRESS NOTES
[address] [facility] [address] [place] [address]   Office [phone]  Fax [phone]          Assessment and Plan     Julio Mott is a 64 y.o. male with a PMH of HTN, DM2, HLD, chronic lymphedema, history of right femoral DVT in 2020 now on [facility] [place] admitted for acute hypoxic respiratory failure 2/2 COVID PNA and lactic acidosis. Patient was admitted on 11/26/2021.    24-Hour Events: No acute events overnight. Blood glucose remains unstable, additional 15u lispro overnight. Acute hypoxic respiratory failure 2/2 COVID pneumonia: Pt with O2 sat of 88% at time of admission. Symptom onset 11/18. Presenting symptoms acutely worsened on date of admission, prompting presentation to the ED. In the ED, given Decadron IV 10mg, 1L NS, with symptomatic improvement. Pt met 1/4 SIRS criteria on admission, and CURB-65 1/5. Rapidly increasing O2 needs on 11/28 - prompting CTA chest - negative for PE. Started on Baricitinib on 11/28.  - Continue supplemental O2, wean as tolerated  - PT/OT consulted  - Decadron 10 mg changed to daily given hyperglycemia  - Continue Baricitinib 4 mg daily (started 11/28)  - Lasix IV 20mg daily  - Pulmonology following, appreciate further recommendations  - COVID meds (Atorvastatin 40mg every day, Vitamin C 500mg BID, and Zinc 220mg every day)  - CBC with diff daily, COVID labs (CRP, D-dimer, ferritin, LD) daily  - f/u ECHO     T2DM: Last A1c 7.9% in 09/2020. On Lantus 100u QHS, SSI Lispro, Metformin 1000mg BID at home. - Endocrinology consulted, appreciate recs  - Lantus 100u daily   - Lispro 30 units TID w/ meals  - NPH 60u linked with Dexamethasone  - SSI  - POC BG ACHS  - Hypoglycemia protocols ordered     History of DVT: On Eliquis at home. F/b Dr. Clyde Maier. Unclear etiology - not provoked per Dr. Slade Common note. Denies fh/o VTE that pt is aware of.  Per chart review, pt started on Xarelto, however pt reportedly taking Eliquis per medication reconciliation with wife over phone.  - Continue Eliquis 5mg BID  - Up and out of bed as tolerated     Hypokalemia: POA 3.1. Wnl.  - BMP and Mg daily  - Replete as needed  - Patient on remote tele    AIDEE: Recommend BiPAP at night.      HLD: Last lipid panel 08/2020 with , HDL 43, LDL 99, . On Rosuvastatin 20mg daily at home. - Will substitute with Atorvastatin 40mg daily here     HTN: POA /68. On Metoprolol XL 50mg daily, Diltiazem 240mg daily, and HCTZ 25mg daily. - Will continue BP meds  - Continue IV Lasix 20mg daily  - Monitor and adjust as required    Constipation: Patient states no bowel movement in over a week. - KUB for possible obstruction  - Scheduled bowel regimen after KUB     Chronic lymphedema: Chronic venous stasis and 3+ pitting edema noted on exam. Pt states he uses compression stockings at home. - Compression stockings ordered  - Lasix as above  - Elevate lower extremities     BPH: Chronic, stable. - Continue Alfuzosin 10mg daily - pharm to substitute     Obesity:  - The pt's Body mass index is 48.15 kg/m². - Encouraging lifestyle modifications and further follow up outpatient. Dysuria: Noted per pt. Likely 2/2 decreased UOP in setting of poor PO intake. UA now without evidence of infection.  - Continue to monitor VS, labs, and symptoms  - Bladder checks and straight cath PRN    Lactic acidosis: DOWNTRENDED. POA 3.0 -> 2.4 -> 2.2 -> 2.5 -> 3.0 -> 3.2 -> 2.8 -> 2.9 -> 2.1. Concern for PE given, O2 requirement. - Stopped trending   - BMP daily    Troponinemia (resolved): Troponin of 15 -> 22 -> 15. Pt without chest pain or tightness. Low concern for ACS. Per chart review, last [facility] 2007 was wnl.  - Will continue to monitor symptoms, electrolytes  - Patient on remote cardiac monitoring       FEN/GI - Diabetic diet. Activity - Out of bed with assistance  DVT prophylaxis - Eliquis  GI prophylaxis - Pepcid 20 BID  Fall prophylaxis - Fall precautions ordered. Disposition - Admit to Remote Telemetry.  PT recommends 2 days/week home PT  Code Status - Partial (DNI). Discussed with patient / caregivers. Next of Katarina 69 Name and Krish Guevara Schroeder [address]aud[address] Duran - [phone]    Stef Jerry MD  Family Medicine Resident           Subjective / Objective       Patient resting comfortably in no acute distress, on HiFlow at 35. No chest pain or dizziness. Respiratory: O2 Flow Rate (L/min): 35 l/min O2 Device: Heated, Hi flow nasal cannula   Visit Vitals  /64 (BP 1 Location: Right lower arm, BP Patient Position: At rest)   Pulse (!) 57   Temp 97.9 °F (36.6 °C)   Resp 23   Ht 6' 2\" (1.88 m)   Wt (!) 379 lb (171.9 kg)   SpO2 92%   BMI 48.66 kg/m²       General: No acute distress. Alert. Cooperative. Obese male positioned comfortably in bed  Respiratory: Fine bibasilar crackles, distal lung sounds  Cardiovascular: Regular rate and rhythm. Distal heart sounds. GI: + bowel sounds. Nontender. No rebound tenderness or guarding. Protuberant abdomen  Extremities:  2+ pitting LE edema bilateral lower extremities w/ chronic venous stasis changes - improved  Skin: Warm, dry. Lymphedema in bilateral LE. Neuro: Awake, alert, and appropriately conversant. I/O:  Date 11/30/21 0700 - 12/01/21 0659 12/01/21 0700 - 12/02/21 0659   Shift 0309-1073 8654-5949 24 Hour Total 5330-7058 9233-2428 24 Hour Total   INTAKE   P.O.         P. O.       I. V.(mL/kg/hr) 0(0)  0(0)        I.V. 0  0      Blood 0  0        Autotransfused 0  0      Other 0  0        Other 0  0      Shift Total(mL/kg) 480(2.8) 650(3.8) 1130(6.6)      OUTPUT   Urine(mL/kg/hr) 900(0.4) 525(0.3) 1425(0.3) 800  800     Urine Voided  800  800     Urine Occurrence(s) 0 x  0 x      Emesis/NG output           Emesis Occurrence(s) 0 x  0 x      Stool           Stool Occurrence(s) 0 x  0 x      Shift Total(mL/kg) 900(5.3) 525(3.1) 1425(8.3) 800(4.7)  800(4.7)   NET -420 898 -295 -800  -800   Weight (kg) 169.6 171.9 171.9 171.9 171.9 171.9       CBC:  Recent Labs 12/01/21  0431 11/30/21  0455 11/29/21  0108   WBC 8.5 8.9 9.3   HGB 13.0 12.9 12.4   HCT 38.3 38.6 37.0    315 296       Metabolic Panel:  Recent Labs     12/01/21  0431 11/30/21  0455 11/29/21  0108   * 135* 134*   K 3.8 3.6 3.8   CL 99 100 98   CO2 27 27 26   BUN 28* 28* 22*   CREA 1.01 1.00 1.13   * 367* 368*   CA 8.4* 8.6 8.3*   MG 2.4  2.5* 2.6* 2.1   ALB  --   --  2.6*   ALT  --   --  49          For Billing    Chief Complaint   Patient presents with   [address] [facility] Problems  Date Reviewed: 11/28/2021          Codes Class Noted POA    Lymphedema ICD-10-CM: I89.0  ICD-9-CM: 701.3  11/27/2021 Yes        BPH (benign prostatic hyperplasia) ICD-10-CM: N40.0  ICD-9-CM: 600.00  11/27/2021 Yes        Elevated troponin ICD-10-CM: R77.8  ICD-9-CM: 790.6  11/27/2021 Yes        History of DVT of lower extremity ICD-10-CM: Z86.718  ICD-9-CM: V12.51  11/27/2021 Yes        * (Principal) Acute hypoxemic respiratory failure due to COVID-19 [facility]) ICD-10-CM: U07.1, J96.01  ICD-9-CM: 518.81, 079.89, 799.02  11/26/2021 Yes        Lactic acidosis ICD-10-CM: E87.2  ICD-9-CM: 276.2  8/15/2020 Yes        Hyperlipemia (Chronic) ICD-10-CM: E78.5  ICD-9-CM: 272.4  3/12/2009 Yes        HTN (hypertension) (Chronic) ICD-10-CM: I10  ICD-9-CM: 401.9  3/12/2009 Yes        Type 2 diabetes mellitus with hyperglycemia, with long-term current use of insulin (HCC) ICD-10-CM: E11.65, Z79.4  ICD-9-CM: 250.00, 790.29, V58.67  3/12/1999 Yes

## 2021-11-30 NOTE — PROGRESS NOTES
Spiritual Care Assessment/Progress Note  [address]      NAME: Monik Singleton      MRN: [id number]  AGE: 64 y.o. SEX: male  Jew Affiliation: Rastafarian   Language: English     11/30/2021     Total Time (in minutes): 25     Spiritual Assessment begun in [facility] [address] through conversation with:         [x]Patient        [x] Family    [] Friend(s)        Reason for Consult: Initial/Spiritual assessment, patient floor     Spiritual beliefs: (Please include comment if needed)     [x] Identifies with a marcos tradition:  Rastafarian        [x] Supported by a marcos community:       [address] 1938      [] Claims no spiritual orientation:           [] Seeking spiritual identity:                [] Adheres to an individual form of spirituality:           [] Not able to assess:                           Identified resources for coping:      [x] Prayer                               [] Music                  [] Guided Imagery     [x] Family/friends                 [] Pet visits     [x] Devotional reading                         [] Unknown     [] Other:                                               Interventions offered during this visit: (See comments for more details)    Patient Interventions: Affirmation of emotions/emotional suffering, Affirmation of marcos, Catharsis/review of pertinent events in supportive environment, Coping skills reviewed/reinforced, Initial/Spiritual assessment, patient floor, Normalization of emotional/spiritual concerns, Prayer (assurance of), Jew beliefs/image of God discussed     Family/Friend(s):  Affirmation of emotions/emotional suffering, Catharsis/review of pertinent events in supportive environment, Normalization of emotional/spiritual concerns, Prayer (assurance of)     Plan of Care:     [] Support spiritual and/or cultural needs    [] Support AMD and/or advance care planning process      [] Support grieving process   [] Coordinate Rites and/or Rituals    [] Coordination with community clergy   [] No spiritual needs identified at this time   [] Detailed Plan of Care below (See Comments)  [] Make referral to Music Therapy  [] Make referral to Pet Therapy     [] Make referral to Addiction services  [] Make referral to [facility]  [] Make referral to Spiritual Care Partner  [] No future visits requested        [x] Contact Spiritual Care for further referrals     Comments:   Initials spiritual care for patient, Mr. Crystal Seo on [address] [facility]. Pt. Is under contact restrictions.  was consulting with attending nurse about Pt, when Pt's daughter, Gemma Ramirez, came to nurses station to deliver shaving cream to her father.  introduced himself and she shared in a polite way that Pt has 2 daughters who both live in [place]. Gemma Ramirez came in from [facility] to help care for both her parents who are both COVID positive. Her mother is home and father is Pt. She shared that he does have a home [employer] supporting him and is a member of the [employer].  shared with her about pastoral care services here for them all.  also called Mr Master Nunes via telephone. He answered and shared about himself. He expressed that he feels (emotionally) fine, optimistic, that he is a member of [address] [place] [address] [employer]. He expressed gratefulness for the support and welcomed prayer.     Chaplain Feliz Hodgkin, M.Div.  Cindy Steiner ([id number])

## 2021-11-30 NOTE — PROGRESS NOTES
0700 Bedside and Verbal shift change report given to 500 Ashley Jaffe (oncoming nurse) by Jean Regan RN (offgoing nurse). Report included the following information SBAR, Kardex, ED Summary, Intake/Output, MAR, Recent Results and Cardiac Rhythm SB/NSR. This patient was assisted with Intentional Toileting every 2 hours during this shift as appropriate. Documentation of ambulation and output reflected on Flowsheet as appropriate. Purposeful hourly rounding was completed using AIDET and 5Ps. Outcomes of PHR documented as they occurred. Bed alarm in use as appropriate. Dual Suction and ambubag in place. [facility] Dr HENRY of Pt , orders received to give 20 units of Sliding scale insulin + the additional scheduled 20 units of Lispro. Will continue to monitor. 0 Pt daughter requesting information, Pt consents to ayaz Koroma receive updates on Pt care. 1629 Pt , notified Dr. Triny Heard with FP, Orders received to give the scheduled 30 units of Insulin Lispro + 20 units of sliding scale insulin for coverage. Will continue to monitor. 1930 Bedside and Verbal shift change report given to [address] Michael (oncoming nurse) by Eagle Pham RN (offgoing nurse). Report included the following information SBAR, Kardex, ED Summary, Intake/Output, MAR, Recent Results and Cardiac Rhythm SB/NSR.

## 2021-11-30 NOTE — PROGRESS NOTES
[facility]     Name: Alejandro Travis MRN: [id number]   : 1960 Hospital: [address]   Date: 2021        Impression Plan   1. Acute respiratory failure  2. COVID 19 PNA  3. Hypoxia  4. Morbid obesity  5. Hx of DVT               · Wean O2 to keep sats above 90%. Will order HF incase O2 requirement goes higher. · Continue Baricitinib  · Continue dexamethasone 10 mg bid  · OOB into chair  · Self prone at night for 3 hrs if possible  · Follow d-dimer  · Follow inflammatory marker  · Continue eliquis  · Encourage IS use     Patient is critically ill and at high risk for decompensation due to acute respiratory failure requiring high flow nasal cannula. CCT: 38 minutes    Radiology  ( personally reviewed) CXR: bilateral infiltrates   ABG No results for input(s): PHI, PO2I, PCO2I in the last 72 hours. Subjective     Cc: shortness of breath    63 yo with morbid obesity, DVT, presenting with covid 19 PNA. Pt first diagnosed . Started to get more SOB in the last couple of days. O2 requirement has increased drastically in the last 24 hrs. Now up to 15 liters HF. Lifetime non-smoker. No hx of lung problems. Unvaccinated. Review of Systems:  A comprehensive review of systems was negative except for that written in the HPI. Interval History:    Afebrile  BP stable  Sats 91% on 40L/100% HFNC  D-dimer . 53--increased  --decreased  CRP 2.61--decreased  Blood cultures no growth x 4 days    ROS:  Currently working with therapy, sats high 80s while standing on HF. Feels \"okay\".     Past Medical History:   Diagnosis Date    AR (allergic rhinitis) 2009    Diabetes ([facility] [address]     Dr. Kermit Lau    History of vascular access device 2020    4f single picc placed on r cephalic, 68RO at 3cm out placed by CARMEN Lowry, difficult limited access    Hypercholesterolemia     Hypertension       Past Surgical History:   Procedure Laterality Date    HX HEART CATHETERIZATION 2007    normal      Prior to Admission medications    Medication Sig Start Date End Date Taking? Authorizing Provider   dexAMETHasone (DECADRON) 6 mg tablet Take 1 Tablet by mouth Daily (before breakfast). Start morning of 11/28 11/27/21  Yes Carley Dominguez MD   zinc sulfate (ZINCATE) 50 mg zinc (220 mg) capsule Take 1 Capsule by mouth daily. 11/27/21  Yes Carley Dominguez MD   ascorbic acid, vitamin C, (VITAMIN C) 500 mg tablet Take 1 Tablet by mouth two (2) times a day. 11/27/21  Yes Carley Dominguez MD   insulin glargine U-300 conc (Toujeo Max U-300 SoloStar) 300 unit/mL (3 mL) inpn 100 Units by SubCUTAneous route nightly. Yes Provider, Historical   apixaban (ELIQUIS) 5 mg tablet Take 5 mg by mouth two (2) times a day. Indications: blood clot in a deep vein of the extremities   Yes Provider, Historical   metoprolol succinate (TOPROL-XL) 50 mg XL tablet Take 50 mg by mouth daily. Yes Provider, Historical   insulin lispro (HUMALOG) 100 unit/mL kwikpen by SubCUTAneous route Before breakfast, lunch, and dinner. Sliding scale   Yes Provider, Historical   dilTIAZem ER (CARDIZEM LA) 240 mg Tb24 tablet Take 240 mg by mouth daily. Yes Provider, Historical   hydroCHLOROthiazide (HYDRODIURIL) 25 mg tablet Take 25 mg by mouth daily. Yes Provider, Historical   rosuvastatin (CRESTOR) 20 mg tablet Take 20 mg by mouth nightly. Yes Provider, Historical   ondansetron (ZOFRAN ODT) 4 mg disintegrating tablet Take 1 Tablet by mouth every eight (8) hours as needed for Nausea or Vomiting. Patient not taking: Reported on 11/26/2021 11/23/21   Ludy Oviedo H, DO   triamcinolone acetonide (KENALOG) 0.1 % topical cream Apply  to affected area three (3) times daily as needed for Skin Irritation. use thin layer 8/6/21   Shamir Lockett, DO   atorvastatin (LIPITOR) 40 mg tablet  2/15/21   Provider, Historical   aspirin delayed-release 81 mg tablet Take 81 mg by mouth daily.   Patient not taking: Reported on 8/6/2021    Provider, Historical   metFORMIN (GLUCOPHAGE) 1,000 mg tablet Take 1,000 mg by mouth two (2) times daily (with meals). Provider, Historical     Current Facility-Administered Medications   Medication Dose Route Frequency    insulin glargine (LANTUS) injection 50 Units  50 Units SubCUTAneous BID    And    insulin glargine (LANTUS) injection 50 Units  50 Units SubCUTAneous BID    furosemide (LASIX) injection 20 mg  20 mg IntraVENous DAILY    dexAMETHasone (DECADRON) tablet 10 mg  10 mg Oral DAILY    insulin lispro (HUMALOG) injection 20 Units  20 Units SubCUTAneous TID WITH MEALS    baricitinib (OLUMIANT) tablet 4 mg  4 mg Oral DAILY    famotidine (PEPCID) tablet 20 mg  20 mg Oral BID    insulin lispro (HUMALOG) injection   SubCUTAneous QID WITH MEALS    sodium chloride (NS) flush 5-40 mL  5-40 mL IntraVENous Q8H    ascorbic acid (vitamin C) (VITAMIN C) tablet 500 mg  500 mg Oral BID    zinc sulfate (ZINCATE) 50 mg zinc (220 mg) capsule 1 Capsule  1 Capsule Oral DAILY    atorvastatin (LIPITOR) tablet 40 mg  40 mg Oral DAILY    dilTIAZem ER (CARDIZEM CD) capsule 240 mg  240 mg Oral DAILY    hydroCHLOROthiazide (HYDRODIURIL) tablet 25 mg  25 mg Oral DAILY    [Held by provider] metoprolol succinate (TOPROL-XL) XL tablet 50 mg  50 mg Oral DAILY    apixaban (ELIQUIS) tablet 5 mg  5 mg Oral BID    tamsulosin (FLOMAX) capsule 0.4 mg  0.4 mg Oral DAILY     No Known Allergies   Social History     Tobacco Use    Smoking status: Former Smoker     Packs/day: 1.00     Years: 15.00     Pack years: 15.00     Types: Cigarettes, Pipe, Cigars     Quit date: 3/12/1991     Years since quittin.7    Smokeless tobacco: Never Used   Substance Use Topics    Alcohol use: No      Family History   Problem Relation Age of Onset    Heart Failure Father     Diabetes Brother     Heart Failure Paternal Grandfather          age 37 AMI          Laboratory: I have personally reviewed the critical care flowsheet and labs. Recent Labs     11/30/21  0455 11/29/21  0108 11/28/21  0035   WBC 8.9 9.3 7.9   HGB 12.9 12.4 12.9   HCT 38.6 37.0 38.5    257 249     Recent Labs     11/30/21  0455 11/29/21  0108 11/28/21  0035   * 134* 133*   K 3.6 3.8 3.4*    98 100   CO2 27 26 23   * 368* 374*   BUN 28* 22* 22*   CREA 1.00 1.13 1.21   CA 8.6 8.3* 8.4*   MG 2.6* 2.1 1.9   ALB  --  2.6*  --    ALT  --  49  --        Objective:     Mode Rate Tidal Volume Pressure FiO2 PEEP            100 %       Vital Signs:     TMAX(24)      Intake/Output:   Last shift:         Last 3 shifts: 11/30 0701 - 11/30 1900  In: -   Out: 500 [Urine:500]RRIOLAST3    Intake/Output Summary (Last 24 hours) at 11/30/2021 1016  Last data filed at 11/30/2021 1140  Gross per 24 hour   Intake 900 ml   Output 3850 ml   Net -2950 ml     EXAM:   GENERAL: obese, dyspnic with exertion, no distress HEENT:  PERRL, EOMI, no alar flaring or epistaxis, oral mucosa moist without cyanosis, NECK:  no jugular vein distention, no retractions, no thyromegaly or masses, LUNGS: Distant breathsounds bilaterally, HEART:  Regular rate and rhythm with no MGR; no edema is present, ABDOMEN:  soft with no tenderness, bowel sounds present, EXTREMITIES:  warm with no cyanosis, SKIN:  no jaundice or ecchymosis and NEUROLOGIC:  alert and oriented, grossly non-focal    Mt , NP  [facility]

## 2021-11-30 NOTE — PROGRESS NOTES
1930 Bedside and Verbal shift change report given to [address] [place] [address] (oncoming nurse) by Edvin Naranjo RN (offgoing nurse). Report included the following information SBAR, Kardex and MAR.     2119 Called MD regarding  patient's bedtime blood glucose of 447. Received orders for humalog insulin. 2200 Patient has head of bed elevated. Wearing high flow oxygen with settings of 30 liters, Fio2 100%. Pulse ox saturation 86-88%. No labored breathing noted. High flow oxygen was adjusted to 35 liters/FiO2 100%. Pulse ox saturation increased to 90-91%. 0110 Patient's heart rate on telemetry was 49-51, and pulse ox saturation 92% with hi-flow oxygen of 35 liters/FiO2 100%. I assessed the patient. Pt was lying on left side in bed resting. The patient was able to wake up and answer when his name was called and answer questions. Asymptomatic. Patient positioned self into supine position. Vital signs: blood pressure 125/51, pulse 61 and pulse ox saturation ranged between 92-94%. Notified on call MD, Dr. Toribio Meza. Will continue to monitor. 0451 patient is awake, head of bed elevated. Hi flow oxygen settings 30L/Fio2 100%. Pulse ox saturations 91-92%. [id number] Monitor tech called about pt's heart rate of 45. At bedside monitor it was 48, patient was sleeping. When pt's name was called he woke up, denies feeling dizzy or light headed. Pulse increased to 55-57 while talking. Notified Dr. Toribio Meza, will round on pt. This am.     Problem: Falls - Risk of  Goal: *Absence of Falls  Description: Document Trey Sol Fall Risk and appropriate interventions in the flowsheet. Outcome: Progressing Towards Goal  Note: Fall Risk Interventions:   Medication Interventions: Bed/chair exit alarm  Elimination Interventions: Bed/chair exit alarm, Call light in reach      This patient was assisted with Intentional Toileting every 2 hours during this shift as appropriate.   Documentation of ambulation and output reflected on Flowsheet as appropriate. Purposeful hourly rounding was completed using AIDET and 5Ps. Outcomes of PHR documented as they occurred. Bed alarm in use as appropriate. Dual Suction and ambubag in place.

## 2021-11-30 NOTE — PROGRESS NOTES
Care Management follow up     Patient admitted for increased shortness of breath, cough. COVID+, pneumonia. History of: HTN, diabetes, hyperlipidemia, chronic lymphedema, right DVT. COVID Vaccine:  no       RUR 8 (Score %) low    Is This a Readmission NO  Is this a Bundle NO     Current status  Patient discussed during interdisciplinary rounds. Patient continues to require medical management including ongoing assessment and monitoring. Continues on high flow oxygen at 35L/100%. Started Baricitinib 11/28/21.      Transition of Care Plan  · Monitor patient status and response to treatment. · Patient continues to require medical management. · Unsure of DC needs at this time, independent prior to admission. · Will monitor for home oxygen needs closer to DC. · CM to monitor progress and recommendations.     Lalito Trevino, RN, MSN/Care manager  [phone]

## 2021-11-30 NOTE — PROGRESS NOTES
Problem: Self Care Deficits Care Plan (Adult)  Goal: *Acute Goals and Plan of Care (Insert Text)  Description: FUNCTIONAL STATUS PRIOR TO ADMISSION: Patient was independent and active without use of DME.     HOME SUPPORT: The patient lived with wife but did not require assist.    Occupational Therapy Goals  Initiated 11/27/2021  1. Patient will perform grooming standing with supervision/set-up within 7 day(s). 2.  Patient will perform bathing with supervision/set-up within 7 day(s). 3.  Patient will perform lower body dressing with supervision/set-up within 7 day(s). 4.  Patient will perform toilet transfers with supervision/set-up within 7 day(s). 5.  Patient will participate in upper extremity therapeutic exercise/activities with supervision/set-up for 5 minutes within 7 day(s). 6.  Patient will utilize energy conservation techniques during functional activities with verbal cues within 7 day(s). Outcome: Progressing Towards Goal       OCCUPATIONAL THERAPY TREATMENT  Patient: Lauri Rehman (89 y.o. male)  Date: 11/30/2021  Diagnosis: Acute hypoxemic respiratory failure due to COVID-19 ([facility]) [U07.1, J96.01]   Acute hypoxemic respiratory failure due to COVID-19 [facility])       Precautions: Contact (COVID; Dropet plus)  Chart, occupational therapy assessment, plan of care, and goals were reviewed. ASSESSMENT  Patient continues with skilled OT services and is progressing towards goals. Patient received on 35 liters heated Hi-flow and initially upon sitting sats appeared low in 70s however poor pleth noted, readjusted on left ear and noted to be in mid to upper 90's. Participated in UE exercises in standing, sidestepping edge of bed and transfer to bedside commode. Encouraged increased activity throughout day, reported doing IS.      Current Level of Function Impacting Discharge (ADLs): supervision overall    Other factors to consider for discharge:          PLAN :  Patient continues to benefit from skilled Pt. Resting comfortably in bed. See VS,I/O's and assess. For status. Pt. Cooperative with cares.   intervention to address the above impairments. Continue treatment per established plan of care to address goals. Recommend with staff: ? Bowel program/management, reported lack of movement in > 3 days and denied informing anyone    Recommend next OT session: standing ADL's    Recommendation for discharge: (in order for the patient to meet his/her long term goals)  To be determined: based on pulmonary status    This discharge recommendation:  Has not yet been discussed the attending provider and/or case management    IF patient discharges home will need the following DME: none       SUBJECTIVE:   Patient stated I can't sleep on my stomach.     OBJECTIVE DATA SUMMARY:   Cognitive/Behavioral Status:  Neurologic State: Alert  Orientation Level: Oriented X4  Cognition: Follows commands; Appropriate safety awareness; Appropriate for age attention/concentration  Perception: Appears intact  Perseveration: No perseveration noted  Safety/Judgement: Awareness of environment; Fall prevention; Insight into deficits    Functional Mobility and Transfers for ADLs:  Bed Mobility:  Supine to Sit: Stand-by assistance  Scooting: Stand-by assistance  Instructed on log roll for sit to supine to facilitate getting up higher in bed   Transfers:  Sit to Stand: Contact guard assistance  Functional Transfers  Toilet Transfer : Supervision; Other (comment) (stand pivot to bedside commode)  Bed to Chair: Stand-by assistance; Contact guard assistance    Balance:  Sitting: Intact; Without support  Standing: Impaired; Without support  Standing - Static: Good  Standing - Dynamic : Fair    ADL Intervention:     Instructed on use of bedside commode as patient asking how he will have a BM due to Hi-flow tubing limitations.  Reported he has not had BM in > 3 days, RN informed    Instructed on positioning in modified prone, placement of pillows, instructed on positioning at head of bed prior to laying down to facilitate neutral alignment and improved breathing          Cognitive Retraining  Safety/Judgement: Awareness of environment; Fall prevention; Insight into deficits    Therapeutic Exercises:   Exercises    Patient educated regarding home exercise program for strengthening, ROM, and respiratory function; they demonstrated appropriate performance. Reviewed importance of symptom monitoring and adjusting HEP based on symptoms and respiratory status. Patient verbalized understanding via verbal statement.        reps sets/day   Breathing Exercises   Seated pursed lip breathing 10 1   Shoulder flexion/extension with coordinated breathing 10 1   Horizontal shoulder abduction/ adduction with coordinated breathing 10 1          Completed in standing       Pain:  No complaint, hx of back pain and does not tolerate prone and minimal tolerance to sidelying    Activity Tolerance:   Poor and desaturates with exertion and requires oxygen    After treatment patient left in no apparent distress:   Supine in bed, Call bell within reach, and Side rails x 3    COMMUNICATION/COLLABORATION:   The patients plan of care was discussed with: Registered nurse and Respiratory therapist.     Nancy Andrade OTR/L  Time Calculation: 29 mins

## 2021-11-30 NOTE — PROGRESS NOTES
[address] [facility] [address] [address]   Office [phone]  Fax [phone]          Assessment and Plan     Stefanie Thakur is a 64 y.o. male with a PMH of HTN, DM2, HLD, chronic lymphedema, history of right femoral DVT in 2020 now on [facility] admitted for acute hypoxic respiratory failure 2/2 COVID PNA and lactic acidosis. Patient was admitted on 11/26/2021.    24-Hour Events: Bradycardic to 40s, but asymptomatic. Otherwise no acute events overnight. Acute hypoxic respiratory failure 2/2 COVID pneumonia: Pt with O2 sat of 88% at time of admission. Symptom onset 11/18. Presenting symptoms acutely worsened on date of admission, prompting presentation to the ED. In the ED, given Decadron IV 10mg, 1L NS, with symptomatic improvement. Pt met 1/4 SIRS criteria on admission, and CURB-65 1/5. Pt currently requiring 40 L HFNC. Rapidly increasing O2 needs on 11/28 - prompting CTA chest - negative for PE. Started on Baricitinib on 11/28.  - Continue supplemental O2, wean as tolerated  - PT/OT consulted  - Decadron 10 mg changed to daily given hyperglycemia  - Continue Baricitinib 4 mg daily (started 11/28)  - Lasix IV 20mg daily  - Pulmonology following, appreciate further recommendations  - COVID meds (Atorvastatin 40mg every day, Vitamin C 500mg BID, and Zinc 220mg every day)  - CBC with diff daily, COVID labs (CRP, D-dimer, ferritin, LD) daily  - f/u ECHO     T2DM: Last A1c 7.9% in 09/2020. On Lantus 100u QHS, SSI Lispro, Metformin 1000mg BID at home. - Endocrinology consulted, appreciate recs  - Lantus 100u daily   - Lispro 30 units TID w/ meals  - NPH 60u linked with Dexamethasone  - SSI  - POC BG ACHS  - Hypoglycemia protocols ordered     History of DVT: On Eliquis at home. F/b Dr. Berta Lang. Unclear etiology - not provoked per Dr. Jana Soto note. Denies fh/o VTE that pt is aware of.  Per chart review, pt started on Xarelto, however pt reportedly taking Eliquis per medication reconciliation with wife over phone.  - Continue Eliquis 5mg BID  - Up and out of bed as tolerated     Hypokalemia: POA 3.1. Wnl.  - BMP and Mg daily  - Replete as needed  - Patient on remote tele     HLD: Last lipid panel 08/2020 with , HDL 43, LDL 99, . On Rosuvastatin 20mg daily at home. - Will substitute with Atorvastatin 40mg daily here     HTN: POA /68. On Metoprolol XL 50mg daily, Diltiazem 240mg daily, and HCTZ 25mg daily. - Will continue BP meds  - Continue IV Lasix 20mg daily  - Monitor and adjust as required     Chronic lymphedema: Chronic venous stasis and 3+ pitting edema noted on exam. Pt states he uses compression stockings at home. - Compression stockings ordered  - Lasix as above  - Elevate lower extremities     BPH: Chronic, stable. - Continue Alfuzosin 10mg daily - pharm to substitute     Obesity:  - The pt's Body mass index is 48.15 kg/m². - Encouraging lifestyle modifications and further follow up outpatient. Dysuria: Noted per pt. Likely 2/2 decreased UOP in setting of poor PO intake. UA now without evidence of infection.  - Continue to monitor VS, labs, and symptoms  - Bladder checks and straight cath PRN    Lactic acidosis: DOWNTRENDED. POA 3.0 -> 2.4 -> 2.2 -> 2.5 -> 3.0 -> 3.2 -> 2.8 -> 2.9 -> 2.1. Concern for PE given, O2 requirement. - Stopped trending   - BMP daily    Troponinemia (resolved): Troponin of 15 -> 22 -> 15. Pt without chest pain or tightness. Low concern for ACS. Per chart review, last [facility] 2007 was wnl.  - Will continue to monitor symptoms, electrolytes  - Patient on remote cardiac monitoring       FEN/GI - Diabetic diet. Activity - Out of bed with assistance  DVT prophylaxis - Eliquis  GI prophylaxis - Pepcid 20 BID  Fall prophylaxis - Fall precautions ordered. Disposition - Admit to Remote Telemetry. PT recommends 2 days/week home PT  Code Status - Partial (DNI). Discussed with patient / caregivers.   Next of Kin Name and Krish Guevara Schroeder [address] Patricia Mckeon MD  Family Medicine Resident           Subjective / Objective     Subjective   Patient seen and examined at bedside. No new complaints. States breathing improved since yesterday. No chest pain, palp, abdominal pain, n/v, c/d. Respiratory: O2 Flow Rate (L/min): 35 l/min O2 Device: Heated, Hi flow nasal cannula   Visit Vitals  /60 (BP 1 Location: Left lower arm, BP Patient Position: At rest)   Pulse 60   Temp 97.8 °F (36.6 °C)   Resp 24   Ht 6' 2\" (1.88 m)   Wt (!) 373 lb 12.8 oz (169.6 kg)   SpO2 97%   BMI 47.99 kg/m²       General: No acute distress. Alert. Cooperative. Obese male positioned comfortably in bed  Respiratory: Fine bibasilar crackles, distal lung sounds  Cardiovascular: Regular rate and rhythm. Distal heart sounds. GI: + bowel sounds. Nontender. No rebound tenderness or guarding. Protuberant abdomen  Extremities:  2+ pitting LE edema bilateral lower extremities w/ chronic venous stasis changes - improved  Skin: Warm, dry. Lymphedema in bilateral LE. Neuro: Awake, alert, and appropriately conversant. Telemetry reviewed: Bradycardia to 40s, occasional PVCs. I/O:  Date 11/29/21 0700 - 11/30/21 0659 11/30/21 0700 - 12/01/21 0659   Shift 4506-9366 2060-4155 24 Hour Total 2413-4137 5663-0231 24 Hour Total   INTAKE   P.O. 480 420 900        P. O. 480 420 900      Shift Total(mL/kg) 480(3.3) 420(2.5) 900(5.3)      OUTPUT   Urine(mL/kg/hr) 2900(1.6) 1050(0.5) 3950(1) 500  500     Urine Voided 2900 1050 3950 500  500     Urine Occurrence(s) 1 x  1 x      Emesis/NG output           Emesis Occurrence(s) 0 x  0 x      Stool           Stool Occurrence(s) 0 x  0 x      Shift Total(mL/kg) 2900(19.7) 1050(6.2) 3950(23.3) 500(2.9)  500(2.9)   NET -6200 -630 -3050 -500  -500   Weight (kg) 147.4 169.6 169.6 169.6 169.6 169.6       CBC:  Recent Labs     11/30/21  0455 11/29/21  0108 11/28/21  0035   WBC 8.9 9.3 7.9   HGB 12.9 12.4 12.9   HCT 38.6 37.0 38.5    257 902       Metabolic Panel:  Recent Labs     11/30/21  0455 11/29/21  0108 11/28/21  0035   * 134* 133*   K 3.6 3.8 3.4*    98 100   CO2 27 26 23   BUN 28* 22* 22*   CREA 1.00 1.13 1.21   * 368* 374*   CA 8.6 8.3* 8.4*   MG 2.6* 2.1 1.9   ALB  --  2.6*  --    ALT  --  49  --           For Billing    Chief Complaint   Patient presents with   [address] [place] [address] Problems  Date Reviewed: 11/28/2021          Codes Class Noted POA    Lymphedema ICD-10-CM: I89.0  ICD-9-CM: 526.3  11/27/2021 Yes        BPH (benign prostatic hyperplasia) ICD-10-CM: N40.0  ICD-9-CM: 600.00  11/27/2021 Yes        Elevated troponin ICD-10-CM: R77.8  ICD-9-CM: 790.6  11/27/2021 Yes        History of DVT of lower extremity ICD-10-CM: Z86.718  ICD-9-CM: V12.51  11/27/2021 Yes        * (Principal) Acute hypoxemic respiratory failure due to COVID-19 [facility]) ICD-10-CM: U07.1, J96.01  ICD-9-CM: 518.81, 079.89, 799.02  11/26/2021 Yes        Lactic acidosis ICD-10-CM: E87.2  ICD-9-CM: 276.2  8/15/2020 Yes        Hyperlipemia (Chronic) ICD-10-CM: E78.5  ICD-9-CM: 272.4  3/12/2009 Yes        HTN (hypertension) (Chronic) ICD-10-CM: I10  ICD-9-CM: 401.9  3/12/2009 Yes        Type 2 diabetes mellitus with hyperglycemia, with long-term current use of insulin (HCC) ICD-10-CM: E11.65, Z79.4  ICD-9-CM: 250.00, 790.29, V58.67  3/12/1999 Yes

## 2021-11-30 NOTE — PROGRESS NOTES
Problem: Mobility Impaired (Adult and Pediatric)  Goal: *Acute Goals and Plan of Care (Insert Text)  Description: FUNCTIONAL STATUS PRIOR TO ADMISSION: Patient was independent and active without use of DME.    HOME SUPPORT PRIOR TO ADMISSION: The patient lived with wife but did not require assist.    Physical Therapy Goals  Initiated 11/27/2021; reviewed 11/30/21 and remain appropriate  1. Patient will move from supine to sit and sit to supine  in bed with independence within 7 day(s). 2.  Patient will transfer from bed to chair and chair to bed with independence using the least restrictive device within 7 day(s). 3.  Patient will perform sit to stand with independence within 7 day(s). 4.  Patient will ambulate with independence for 150 feet with the least restrictive device within 7 day(s). Outcome: Not Met   PHYSICAL THERAPY REEVALUATION  Patient: Lauri Rehman (28 y.o. male)  Date: 11/30/2021  Primary Diagnosis: Acute hypoxemic respiratory failure due to COVID[facility] [U07.1, J96.01]        Precautions:   Contact (COVID; Dropet plus)      ASSESSMENT  Based on the objective data described below, the patient presents with increased oxygen requirements, impaired standing dynamic balance, reduced endurance and overall reduced functional mobility in the setting of hospital admission for COVID-19 infection. Per chart, CXR demonstrates worsening airspace disease in bilateral lower lungs w/ new small R pleural effusion. Patient today received on 30L 100% FIO2 HFNC. During mobility, patient SpO2 ranging between 87-89% despite cuing for pursed lip breathing. Patient tolerates >8 minutes of standing which he states feels good and walking with frequent, intermittent rest breaks with CGA/SBA. During initial sit > stand transfer, patient with one lateral LOB requiring Compa from PT to assist and prevent further LOB. Patient states LOB was related to cords, however standing balance remains impaired.  Reviewed pursed lip breathing, therapeutic exercises and importance of changing positions with patient indicating understanding. Recommend HHPT upon d/c. Current Level of Function Impacting Discharge (mobility/balance): CGA    Functional Outcome Measure: The patient scored 55/100 on the Barthel Index outcome measure which is indicative of partial dependence on others for functional mobility . Other factors to consider for discharge: none additional     Patient will benefit from skilled therapy intervention to address the above noted impairments. PLAN :  Recommendations and Planned Interventions: bed mobility training, transfer training, gait training, therapeutic exercises, edema management/control, patient and family training/education, and therapeutic activities      Frequency/Duration: Patient will be followed by physical therapy:  5 times a week to address goals. Recommendation for discharge: (in order for the patient to meet his/her long term goals)  Physical therapy at least 2 days/week in the home vs. TBD pending pulmonary progress    This discharge recommendation:  Has not yet been discussed the attending provider and/or case management    Equipment recommendations for successful discharge (if) home: none         SUBJECTIVE:   Patient stated how am I supposed to do my exercises with that pad on. re: patient expressing frustration with chair alarm pad set at request of RN prior to session. OBJECTIVE DATA SUMMARY:   Patient received seated at edge of bed, agreeable to participate in PT session. Patient was cleared by nursing to participate in PT session.        HISTORY:    Past Medical History:   Diagnosis Date    AR (allergic rhinitis) 6/11/2009    Diabetes ([facility]     Dr. Irene Harris    History of vascular access device 08/18/2020    4f single picc placed on r cephalic, 27MN at 3cm out placed by CARMEN Lowry, difficult limited access    Hypercholesterolemia     Hypertension      Past Surgical History: Procedure Laterality Date    HX HEART CATHETERIZATION  2007    normal     Hospital course since last seen and reason for reevaluation: CXR demonstrates worsening airspace disease in bilateral lower lungs w/ new small R pleural effusion. Personal factors and/or comorbidities impacting plan of care: none additional    Home Situation  Home Environment: Private residence  # Steps to Enter: 3  Rails to Enter: Yes  Hand Rails : Left  One/Two Story Residence: One story  Living Alone: No  Support Systems: Spouse/Significant Other  Patient Expects to be Discharged to[de-identified] House  Current DME Used/Available at Home: Compression garments, Raised toilet seat  Tub or Shower Type: Shower    EXAMINATION/PRESENTATION/DECISION MAKING:   Critical Behavior:  Neurologic State: Alert, Appropriate for age  Orientation Level: Oriented X4        Hearing: Auditory  Auditory Impairment: None  Skin:  all observed intact  Edema: defer to RN notes; richard LE edema present, per chart at baseline  Range Of Motion:  AROM: Generally decreased, functional                       Strength:    Strength: Generally decreased, functional                    Tone & Sensation:   Tone: Normal                              Coordination:  Coordination: Within functional limits  Vision:      Functional Mobility:  Bed Mobility:     Supine to Sit: Stand-by assistance     Scooting: Stand-by assistance  Transfers:  Sit to Stand: Contact guard assistance  Stand to Sit: Stand-by assistance        Bed to Chair: Stand-by assistance; Contact guard assistance              Balance:   Sitting: Intact; Without support  Standing: Impaired;  Without support  Standing - Static: Good  Standing - Dynamic : Fair  Ambulation/Gait Training:  Distance (ft): 40 Feet (ft)  Assistive Device: Gait belt  Ambulation - Level of Assistance: Contact guard assistance; Stand-by assistance        Gait Abnormalities: Trunk sway increased        Base of Support: Widened     Speed/Jadyn: Melophone decreased (<100 feet/min) (rest breaks required)  Step Length: Right shortened; Left shortened                  Therapeutic Exercises:   Reviewed pursed lip breathing, sit > stand transfer, LAQ & seated marching     Functional Measure:  Barthel Index:    Bathin  Bladder: 10  Bowels: 10  Groomin  Dressin  Feeding: 10  Mobility: 0  Stairs: 0  Toilet Use: 5  Transfer (Bed to Chair and Back): 10  Total: 55/100       The Barthel ADL Index: Guidelines  1. The index should be used as a record of what a patient does, not as a record of what a patient could do. 2. The main aim is to establish degree of independence from any help, physical or verbal, however minor and for whatever reason. 3. The need for supervision renders the patient not independent. 4. A patient's performance should be established using the best available evidence. Asking the patient, friends/relatives and nurses are the usual sources, but direct observation and common sense are also important. However direct testing is not needed. 5. Usually the patient's performance over the preceding 24-48 hours is important, but occasionally longer periods will be relevant. 6. Middle categories imply that the patient supplies over 50 per cent of the effort. 7. Use of aids to be independent is allowed. Score Interpretation (from [address] [address])    Independent   60-79 Minimally independent   40-59 Partially dependent   20-39 Very dependent   <20 Totally dependent     -Jeanne Lucero., Barthel, D.W. (1965). Functional evaluation: the Barthel Index. [facility] ([address] (1997). The Barthel activities of daily living index: self-reporting versus actual performance in the old (> or = 75 years). Journal of [address] [place] [address] 45(7), [facility], .MichelleMichelle, Shantanu Patel., Maranda Garcia. (1999).  Measuring the change in disability after inpatient rehabilitation; comparison of the responsiveness of the Barthel Index and Functional Hubbard Measure. Journal of Neurology, Neurosurgery, and Psychiatry, 66(4), 416-640. ANASTASIA Hugo, NANCIE Grijalva, & Taylor Ha M.A. (2004) Assessment of post-stroke quality of life in cost-effectiveness studies: The usefulness of the Barthel Index and the EuroQoL-5D. Quality of Life Research, 13, 427-43              Pain Rating:  Patient without reports of pain during therapy      Activity Tolerance:   Fair, desaturates with exertion and requires oxygen, and requires frequent rest breaks    After treatment patient left in no apparent distress:   Sitting in chair, Heels elevated for pressure relief, Call bell within reach, and Bed / chair alarm activated    COMMUNICATION/EDUCATION:   The patients plan of care was discussed with: Registered nurse. Fall prevention education was provided and the patient/caregiver indicated understanding. and Patient/family have participated as able in goal setting and plan of care.     Thank you for this referral.  Sonal Arana PT, DPT   Time Calculation: 29 mins

## 2021-12-01 ENCOUNTER — APPOINTMENT (OUTPATIENT)
Dept: GENERAL RADIOLOGY | Age: 61
DRG: 177 | End: 2021-12-01
Attending: STUDENT IN AN ORGANIZED HEALTH CARE EDUCATION/TRAINING PROGRAM
Payer: COMMERCIAL

## 2021-12-01 LAB
ANION GAP SERPL CALC-SCNC: 7 MMOL/L (ref 5–15)
BASOPHILS # BLD: 0 K/UL (ref 0–0.1)
BASOPHILS NFR BLD: 0 % (ref 0–1)
BUN SERPL-MCNC: 28 MG/DL (ref 6–20)
BUN/CREAT SERPL: 28 (ref 12–20)
CALCIUM SERPL-MCNC: 8.4 MG/DL (ref 8.5–10.1)
CHLORIDE SERPL-SCNC: 99 MMOL/L (ref 97–108)
CO2 SERPL-SCNC: 27 MMOL/L (ref 21–32)
CREAT SERPL-MCNC: 1.01 MG/DL (ref 0.7–1.3)
CRP SERPL-MCNC: 1.01 MG/DL (ref 0–0.6)
D DIMER PPP FEU-MCNC: 1.71 MG/L FEU (ref 0–0.65)
DIFFERENTIAL METHOD BLD: ABNORMAL
EOSINOPHIL # BLD: 0 K/UL (ref 0–0.4)
EOSINOPHIL NFR BLD: 0 % (ref 0–7)
ERYTHROCYTE [DISTWIDTH] IN BLOOD BY AUTOMATED COUNT: 14.6 % (ref 11.5–14.5)
FERRITIN SERPL-MCNC: 522 NG/ML (ref 26–388)
GLUCOSE BLD STRIP.AUTO-MCNC: 316 MG/DL (ref 65–117)
GLUCOSE BLD STRIP.AUTO-MCNC: 366 MG/DL (ref 65–117)
GLUCOSE BLD STRIP.AUTO-MCNC: 380 MG/DL (ref 65–117)
GLUCOSE BLD STRIP.AUTO-MCNC: 408 MG/DL (ref 65–117)
GLUCOSE SERPL-MCNC: 404 MG/DL (ref 65–100)
HCT VFR BLD AUTO: 38.3 % (ref 36.6–50.3)
HGB BLD-MCNC: 13 G/DL (ref 12.1–17)
IMM GRANULOCYTES # BLD AUTO: 0 K/UL
IMM GRANULOCYTES NFR BLD AUTO: 0 %
LDH SERPL L TO P-CCNC: 316 U/L (ref 85–241)
LYMPHOCYTES # BLD: 0.9 K/UL (ref 0.8–3.5)
LYMPHOCYTES NFR BLD: 10 % (ref 12–49)
MAGNESIUM SERPL-MCNC: 2.4 MG/DL (ref 1.6–2.4)
MAGNESIUM SERPL-MCNC: 2.5 MG/DL (ref 1.6–2.4)
MCH RBC QN AUTO: 29.1 PG (ref 26–34)
MCHC RBC AUTO-ENTMCNC: 33.9 G/DL (ref 30–36.5)
MCV RBC AUTO: 85.7 FL (ref 80–99)
MONOCYTES # BLD: 0.6 K/UL (ref 0–1)
MONOCYTES NFR BLD: 7 % (ref 5–13)
NEUTS SEG # BLD: 7 K/UL (ref 1.8–8)
NEUTS SEG NFR BLD: 83 % (ref 32–75)
NRBC # BLD: 0 K/UL (ref 0–0.01)
NRBC BLD-RTO: 0 PER 100 WBC
PLATELET # BLD AUTO: 341 K/UL (ref 150–400)
PMV BLD AUTO: 10.2 FL (ref 8.9–12.9)
POTASSIUM SERPL-SCNC: 3.8 MMOL/L (ref 3.5–5.1)
RBC # BLD AUTO: 4.47 M/UL (ref 4.1–5.7)
RBC MORPH BLD: ABNORMAL
SERVICE CMNT-IMP: ABNORMAL
SODIUM SERPL-SCNC: 133 MMOL/L (ref 136–145)
WBC # BLD AUTO: 8.5 K/UL (ref 4.1–11.1)

## 2021-12-01 PROCEDURE — 74011250637 HC RX REV CODE- 250/637: Performed by: STUDENT IN AN ORGANIZED HEALTH CARE EDUCATION/TRAINING PROGRAM

## 2021-12-01 PROCEDURE — 65660000000 HC RM CCU STEPDOWN

## 2021-12-01 PROCEDURE — 99291 CRITICAL CARE FIRST HOUR: CPT | Performed by: FAMILY MEDICINE

## 2021-12-01 PROCEDURE — 77010033711 HC HIGH FLOW OXYGEN

## 2021-12-01 PROCEDURE — 86140 C-REACTIVE PROTEIN: CPT

## 2021-12-01 PROCEDURE — 74011636637 HC RX REV CODE- 636/637: Performed by: STUDENT IN AN ORGANIZED HEALTH CARE EDUCATION/TRAINING PROGRAM

## 2021-12-01 PROCEDURE — 83615 LACTATE (LD) (LDH) ENZYME: CPT

## 2021-12-01 PROCEDURE — 82962 GLUCOSE BLOOD TEST: CPT

## 2021-12-01 PROCEDURE — 85025 COMPLETE CBC W/AUTO DIFF WBC: CPT

## 2021-12-01 PROCEDURE — 74011250636 HC RX REV CODE- 250/636: Performed by: STUDENT IN AN ORGANIZED HEALTH CARE EDUCATION/TRAINING PROGRAM

## 2021-12-01 PROCEDURE — 94760 N-INVAS EAR/PLS OXIMETRY 1: CPT

## 2021-12-01 PROCEDURE — 74011250637 HC RX REV CODE- 250/637: Performed by: INTERNAL MEDICINE

## 2021-12-01 PROCEDURE — 83735 ASSAY OF MAGNESIUM: CPT

## 2021-12-01 PROCEDURE — 36415 COLL VENOUS BLD VENIPUNCTURE: CPT

## 2021-12-01 PROCEDURE — 99233 SBSQ HOSP IP/OBS HIGH 50: CPT | Performed by: FAMILY MEDICINE

## 2021-12-01 PROCEDURE — 80048 BASIC METABOLIC PNL TOTAL CA: CPT

## 2021-12-01 PROCEDURE — 82728 ASSAY OF FERRITIN: CPT

## 2021-12-01 PROCEDURE — 85379 FIBRIN DEGRADATION QUANT: CPT

## 2021-12-01 RX ORDER — POLYETHYLENE GLYCOL 3350 17 G/17G
17 POWDER, FOR SOLUTION ORAL DAILY
Status: DISCONTINUED | OUTPATIENT
Start: 2021-12-02 | End: 2021-12-05 | Stop reason: HOSPADM

## 2021-12-01 RX ADMIN — Medication 10 ML: at 22:16

## 2021-12-01 RX ADMIN — TAMSULOSIN HYDROCHLORIDE 0.4 MG: 0.4 CAPSULE ORAL at 09:02

## 2021-12-01 RX ADMIN — INSULIN LISPRO 30 UNITS: 100 INJECTION, SOLUTION INTRAVENOUS; SUBCUTANEOUS at 12:31

## 2021-12-01 RX ADMIN — INSULIN LISPRO 10 UNITS: 100 INJECTION, SOLUTION INTRAVENOUS; SUBCUTANEOUS at 17:17

## 2021-12-01 RX ADMIN — INSULIN GLARGINE 50 UNITS: 100 INJECTION, SOLUTION SUBCUTANEOUS at 09:02

## 2021-12-01 RX ADMIN — FUROSEMIDE 20 MG: 10 INJECTION, SOLUTION INTRAMUSCULAR; INTRAVENOUS at 09:05

## 2021-12-01 RX ADMIN — ATORVASTATIN CALCIUM 40 MG: 20 TABLET, FILM COATED ORAL at 09:02

## 2021-12-01 RX ADMIN — Medication 1 CAPSULE: at 09:02

## 2021-12-01 RX ADMIN — INSULIN LISPRO 30 UNITS: 100 INJECTION, SOLUTION INTRAVENOUS; SUBCUTANEOUS at 09:03

## 2021-12-01 RX ADMIN — INSULIN LISPRO 5 UNITS: 100 INJECTION, SOLUTION INTRAVENOUS; SUBCUTANEOUS at 22:16

## 2021-12-01 RX ADMIN — DEXAMETHASONE SODIUM PHOSPHATE 10 MG: 4 INJECTION, SOLUTION INTRAMUSCULAR; INTRAVENOUS at 09:05

## 2021-12-01 RX ADMIN — OXYCODONE HYDROCHLORIDE AND ACETAMINOPHEN 500 MG: 500 TABLET ORAL at 17:18

## 2021-12-01 RX ADMIN — APIXABAN 5 MG: 5 TABLET, FILM COATED ORAL at 09:02

## 2021-12-01 RX ADMIN — APIXABAN 5 MG: 5 TABLET, FILM COATED ORAL at 17:18

## 2021-12-01 RX ADMIN — DILTIAZEM HYDROCHLORIDE 240 MG: 240 CAPSULE, COATED, EXTENDED RELEASE ORAL at 09:02

## 2021-12-01 RX ADMIN — INSULIN HUMAN 60 UNITS: 100 INJECTION, SUSPENSION SUBCUTANEOUS at 09:02

## 2021-12-01 RX ADMIN — OXYCODONE HYDROCHLORIDE AND ACETAMINOPHEN 500 MG: 500 TABLET ORAL at 09:07

## 2021-12-01 RX ADMIN — HYDROCHLOROTHIAZIDE 25 MG: 25 TABLET ORAL at 09:02

## 2021-12-01 RX ADMIN — FAMOTIDINE 20 MG: 20 TABLET, FILM COATED ORAL at 09:02

## 2021-12-01 RX ADMIN — BARICITINIB 4 MG: 2 TABLET, FILM COATED ORAL at 09:07

## 2021-12-01 RX ADMIN — Medication 10 ML: at 17:17

## 2021-12-01 RX ADMIN — FAMOTIDINE 20 MG: 20 TABLET, FILM COATED ORAL at 17:18

## 2021-12-01 RX ADMIN — INSULIN LISPRO 10 UNITS: 100 INJECTION, SOLUTION INTRAVENOUS; SUBCUTANEOUS at 12:31

## 2021-12-01 RX ADMIN — INSULIN LISPRO 30 UNITS: 100 INJECTION, SOLUTION INTRAVENOUS; SUBCUTANEOUS at 17:18

## 2021-12-01 RX ADMIN — INSULIN GLARGINE 50 UNITS: 100 INJECTION, SOLUTION SUBCUTANEOUS at 09:04

## 2021-12-01 NOTE — DIABETES MGMT
This is an unvaccinated male with a history of  morbid obesity and Type 2 diabetes followed by Dr David Perez on Lantus and metformin admitted with Dx of Covid pneumonia. Presentation on admission low grade temperature, 88% O2 RA, Bilateral air space disease on CXR. Pt admitted and treated per protocol including dexamethasone. Hospital course characterized by progressive SOB with increasing O2 requirements. Over the last 24 hours, his insulin has been adjusted as recommended. He is now receiving 60 units of NPH insulin with each 10 mg dose of dexamethasone in addition to 100 units of Lantus and Humalog 30 units with meals. Blood sugars remain elevated between 300-400. Over the new insulin regimen is only just begun. Examination  Blood pressure 115/64  Pulse 70  Afebrile  91% on high flow    Impression  1. Covid pneumonia on high flow O2 and per protocol  2. Type 2 diabetes mellitus with marked elevations in blood sugar exacerbated by glucocorticoids    Recommendation  1. Before making further adjustments in insulin I would give the current strategy the next 12 to 24 hours.   2.  Rest per team    Total time 25 minutes

## 2021-12-01 NOTE — PROGRESS NOTES
Care Management follow up, LOS 4 days     Patient admitted for increased shortness of breath, cough.  COVID+, pneumonia. History of: HTN, diabetes, hyperlipidemia, chronic lymphedema, right DVT. COVID Vaccine:  no       RUR 9 (Score %) low    Is This a Readmission NO  Is this a Bundle NO     Current status  Patient discussed during interdisciplinary rounds. Patient continues to require medical management including ongoing assessment and monitoring.  Continues on high flow oxygen at 30L/100%, weaning as tolerated. Started Baricitinib 11/28/21.      Transition of Care Plan  · Monitor patient status and response to treatment. · Patient continues to require medical management. · Unsure of DC needs at this time, independent prior to admission. · Will monitor for home oxygen needs closer to DC. · CM to monitor progress and recommendations.     Annabelle Delarosa RN, MSN/Care manager  [phone]

## 2021-12-01 NOTE — PROGRESS NOTES
Bedside and Verbal shift change report given to Leonid Jerome RN (oncoming nurse) by Mynor Barrera RN (offgoing nurse).  Report included the following information SBAR, Kardex, Intake/Output, MAR, Recent Results, Med Rec Status and Cardiac Rhythm SR.

## 2021-12-01 NOTE — PROGRESS NOTES
[facility]     Name: Savage Ramírez MRN: [id number]   : 1960 [address]   Date: 2021        Impression Plan   1. Acute respiratory failure  2. COVID 19 PNA  3. Hypoxia  4. Morbid obesity  5. Hx of DVT               · Wean O2 to keep sats above 90%  · Continue Baricitinib  · Continue dexamethasone 10 mg bid  · OOB into chair  · Self prone at night for 3 hrs if possible  · Follow d-dimer  · Follow inflammatory marker  · Continue eliquis  · Encourage IS use     Patient is critically ill and at high risk for decompensation due to acute respiratory failure requiring high flow nasal cannula. CCT: 33 minutes    Radiology  ( personally reviewed) CXR: bilateral infiltrates   ABG No results for input(s): PHI, PO2I, PCO2I in the last 72 hours. Subjective     Cc: shortness of breath    65 yo with morbid obesity, DVT, presenting with covid 19 PNA. Pt first diagnosed . Started to get more SOB in the last couple of days. O2 requirement has increased drastically in the last 24 hrs. Now up to 15 liters HF. Lifetime non-smoker. No hx of lung problems. Unvaccinated. Review of Systems:  A comprehensive review of systems was negative except for that written in the HPI. Interval History:    Afebrile  BP stable  Sats 94% on 30L/100% HFNC--better  D-dimer 1.71--increased  --decreased  CRP 2.61--decreased  Blood cultures no growth x 4 days    ROS:  States that he is feeling a little better. Not too SOB. Denies cough, sputum production.       Past Medical History:   Diagnosis Date    AR (allergic rhinitis) 2009    Diabetes ([facility]     Dr. Bruno Puri    History of vascular access device 2020    4f single picc placed on r cephalic, 69XR at 3cm out placed by CARMEN Lowry, difficult limited access    Hypercholesterolemia     Hypertension       Past Surgical History:   Procedure Laterality Date    HX HEART CATHETERIZATION      normal      Prior to Admission medications    Medication Sig Start Date End Date Taking? Authorizing Provider   dexAMETHasone (DECADRON) 6 mg tablet Take 1 Tablet by mouth Daily (before breakfast). Start morning of 11/28 11/27/21  Yes Anthony Roach MD   zinc sulfate (ZINCATE) 50 mg zinc (220 mg) capsule Take 1 Capsule by mouth daily. 11/27/21  Yes Anthony Roach MD   ascorbic acid, vitamin C, (VITAMIN C) 500 mg tablet Take 1 Tablet by mouth two (2) times a day. 11/27/21  Yes Anthony Roach MD   insulin glargine U-300 conc (Toujeo Max U-300 SoloStar) 300 unit/mL (3 mL) inpn 100 Units by SubCUTAneous route nightly. Yes Provider, Historical   apixaban (ELIQUIS) 5 mg tablet Take 5 mg by mouth two (2) times a day. Indications: blood clot in a deep vein of the extremities   Yes Provider, Historical   metoprolol succinate (TOPROL-XL) 50 mg XL tablet Take 50 mg by mouth daily. Yes Provider, Historical   insulin lispro (HUMALOG) 100 unit/mL kwikpen by SubCUTAneous route Before breakfast, lunch, and dinner. Sliding scale   Yes Provider, Historical   dilTIAZem ER (CARDIZEM LA) 240 mg Tb24 tablet Take 240 mg by mouth daily. Yes Provider, Historical   hydroCHLOROthiazide (HYDRODIURIL) 25 mg tablet Take 25 mg by mouth daily. Yes Provider, Historical   rosuvastatin (CRESTOR) 20 mg tablet Take 20 mg by mouth nightly. Yes Provider, Historical   ondansetron (ZOFRAN ODT) 4 mg disintegrating tablet Take 1 Tablet by mouth every eight (8) hours as needed for Nausea or Vomiting. Patient not taking: Reported on 11/26/2021 11/23/21   Cesia Florez H, DO   triamcinolone acetonide (KENALOG) 0.1 % topical cream Apply  to affected area three (3) times daily as needed for Skin Irritation. use thin layer 8/6/21   Shamir Lockett H, DO   atorvastatin (LIPITOR) 40 mg tablet  2/15/21   Provider, Historical   aspirin delayed-release 81 mg tablet Take 81 mg by mouth daily.   Patient not taking: Reported on 8/6/2021    Provider, Historical   metFORMIN (GLUCOPHAGE) 1,000 mg tablet Take 1,000 mg by mouth two (2) times daily (with meals).     Provider, Historical     Current Facility-Administered Medications   Medication Dose Route Frequency    insulin lispro (HUMALOG) injection 30 Units  30 Units SubCUTAneous TID WITH MEALS    dexamethasone (DECADRON) 4 mg/mL injection 10 mg  10 mg IntraVENous Q24H    And    insulin NPH (NOVOLIN N, HUMULIN N) injection 60 Units  60 Units SubCUTAneous DAILY    insulin glargine (LANTUS) injection 50 Units  50 Units SubCUTAneous DAILY    And    insulin glargine (LANTUS) injection 50 Units  50 Units SubCUTAneous DAILY    furosemide (LASIX) injection 20 mg  20 mg IntraVENous DAILY    baricitinib (OLUMIANT) tablet 4 mg  4 mg Oral DAILY    famotidine (PEPCID) tablet 20 mg  20 mg Oral BID    insulin lispro (HUMALOG) injection   SubCUTAneous QID WITH MEALS    sodium chloride (NS) flush 5-40 mL  5-40 mL IntraVENous Q8H    ascorbic acid (vitamin C) (VITAMIN C) tablet 500 mg  500 mg Oral BID    zinc sulfate (ZINCATE) 50 mg zinc (220 mg) capsule 1 Capsule  1 Capsule Oral DAILY    atorvastatin (LIPITOR) tablet 40 mg  40 mg Oral DAILY    dilTIAZem ER (CARDIZEM CD) capsule 240 mg  240 mg Oral DAILY    hydroCHLOROthiazide (HYDRODIURIL) tablet 25 mg  25 mg Oral DAILY    [Held by provider] metoprolol succinate (TOPROL-XL) XL tablet 50 mg  50 mg Oral DAILY    apixaban (ELIQUIS) tablet 5 mg  5 mg Oral BID    tamsulosin (FLOMAX) capsule 0.4 mg  0.4 mg Oral DAILY     No Known Allergies   Social History     Tobacco Use    Smoking status: Former Smoker     Packs/day: 1.00     Years: 15.00     Pack years: 15.00     Types: Cigarettes, Pipe, Cigars     Quit date: 3/12/1991     Years since quittin.7    Smokeless tobacco: Never Used   Substance Use Topics    Alcohol use: No      Family History   Problem Relation Age of Onset    Heart Failure Father     Diabetes Brother     Heart Failure Paternal Grandfather          age 37 AMI Laboratory: I have personally reviewed the critical care flowsheet and labs.      Recent Labs     12/01/21  0431 11/30/21  0455 11/29/21  0108   WBC 8.5 8.9 9.3   HGB 13.0 12.9 12.4   HCT 38.3 38.6 37.0    315 257     Recent Labs     12/01/21  0431 11/30/21  0455 11/29/21  0108   * 135* 134*   K 3.8 3.6 3.8   CL 99 100 98   CO2 27 27 26   * 367* 368*   BUN 28* 28* 22*   CREA 1.01 1.00 1.13   CA 8.4* 8.6 8.3*   MG 2.4  2.5* 2.6* 2.1   ALB  --   --  2.6*   ALT  --   --  49       Objective:     Mode Rate Tidal Volume Pressure FiO2 PEEP            100 %       Vital Signs:     TMAX(24)      Intake/Output:   Last shift:         Last 3 shifts: 12/01 0701 - 12/01 1900  In: -   Out: 800 [Urine:800]RRIOLAST3    Intake/Output Summary (Last 24 hours) at 12/1/2021 1049  Last data filed at 12/1/2021 9469  Gross per 24 hour   Intake 890 ml   Output 1725 ml   Net -835 ml     EXAM:   GENERAL: obese, dyspnic with exertion, no distress HEENT:  PERRL, EOMI, no alar flaring or epistaxis, oral mucosa moist without cyanosis, NECK:  no jugular vein distention, no retractions, no thyromegaly or masses, LUNGS: Distant breathsounds bilaterally, HEART:  Regular rate and rhythm with no MGR; no edema is present, ABDOMEN:  soft with no tenderness, bowel sounds present, EXTREMITIES:  warm with no cyanosis, SKIN:  no jaundice or ecchymosis and NEUROLOGIC:  alert and oriented, grossly non-focal    Rhea Fuller NP  [facility]

## 2021-12-01 NOTE — PROGRESS NOTES
0700 Bedside and Verbal shift change report given to [address] (oncoming nurse) by Shauna Whitehead (offgoing nurse). Report included the following information SBAR, Kardex, ED Summary, Intake/Output, MAR, Recent Results and Cardiac Rhythm SB/NSR. This patient was assisted with Intentional Toileting every 2 hours during this shift as appropriate. Documentation of ambulation and output reflected on Flowsheet as appropriate. Purposeful hourly rounding was completed using AIDET and 5Ps. Outcomes of PHR documented as they occurred. Bed alarm in use as appropriate. Dual Suction and ambubag in place. 0900 Pt , notified KARIN HENRY, orders received to hold Sliding scale insulin and continue to give scheduled insulins. Will continue to monitor. 1215 Pt  notified Dr. Jose Henao with FP, orders receive to give 10 units of sliding scale insulin in addition to the scheduled Insulin Lispro (30 units) for a total of 40 Units of insulin. Will continue to monitor. [address] [facility], Notified Dr. Jose Henao with FP that Pt , orders received to give 10 units of sliding scale insulin in addition to the scheduled insulin Lispro (30 units); a total of 40 units of insulin with Dinner. Will continue to monitor. 1930 Bedside and Verbal shift change report given to [address] (oncoming nurse) by Luz Maria Jasso RN (offgoing nurse). Report included the following information SBAR, Kardex, ED Summary, Intake/Output, MAR, Recent Results and Cardiac Rhythm SB/NSR.

## 2021-12-01 NOTE — PROGRESS NOTES
Called patient's wife Cassia Power to give her an update on his care. All questions answered.      Shari Ricks, DO

## 2021-12-01 NOTE — PROGRESS NOTES
Telephone call made to [facility] resident, Dr. Vandana Alegria, regarding evening BG of 352; requested Humalog dosage order as per protocol; received orders to administer 15 U of Humalog

## 2021-12-02 LAB
ANION GAP SERPL CALC-SCNC: 10 MMOL/L (ref 5–15)
BACTERIA SPEC CULT: NORMAL
BASOPHILS # BLD: 0 K/UL (ref 0–0.1)
BASOPHILS NFR BLD: 0 % (ref 0–1)
BUN SERPL-MCNC: 28 MG/DL (ref 6–20)
BUN/CREAT SERPL: 25 (ref 12–20)
CALCIUM SERPL-MCNC: 8.5 MG/DL (ref 8.5–10.1)
CHLORIDE SERPL-SCNC: 97 MMOL/L (ref 97–108)
CO2 SERPL-SCNC: 27 MMOL/L (ref 21–32)
CREAT SERPL-MCNC: 1.12 MG/DL (ref 0.7–1.3)
CRP SERPL-MCNC: 0.51 MG/DL (ref 0–0.6)
D DIMER PPP FEU-MCNC: 0.4 MG/L FEU (ref 0–0.65)
DIFFERENTIAL METHOD BLD: ABNORMAL
EOSINOPHIL # BLD: 0 K/UL (ref 0–0.4)
EOSINOPHIL NFR BLD: 0 % (ref 0–7)
ERYTHROCYTE [DISTWIDTH] IN BLOOD BY AUTOMATED COUNT: 14.3 % (ref 11.5–14.5)
FERRITIN SERPL-MCNC: 488 NG/ML (ref 26–388)
GLUCOSE BLD STRIP.AUTO-MCNC: 316 MG/DL (ref 65–117)
GLUCOSE BLD STRIP.AUTO-MCNC: 336 MG/DL (ref 65–117)
GLUCOSE BLD STRIP.AUTO-MCNC: 340 MG/DL (ref 65–117)
GLUCOSE BLD STRIP.AUTO-MCNC: 358 MG/DL (ref 65–117)
GLUCOSE BLD STRIP.AUTO-MCNC: 458 MG/DL (ref 65–117)
GLUCOSE SERPL-MCNC: 311 MG/DL (ref 65–100)
HCT VFR BLD AUTO: 40.1 % (ref 36.6–50.3)
HGB BLD-MCNC: 13.7 G/DL (ref 12.1–17)
IMM GRANULOCYTES # BLD AUTO: 0 K/UL
IMM GRANULOCYTES NFR BLD AUTO: 0 %
LDH SERPL L TO P-CCNC: 337 U/L (ref 85–241)
LYMPHOCYTES # BLD: 0.6 K/UL (ref 0.8–3.5)
LYMPHOCYTES NFR BLD: 6 % (ref 12–49)
MAGNESIUM SERPL-MCNC: 2.1 MG/DL (ref 1.6–2.4)
MCH RBC QN AUTO: 29 PG (ref 26–34)
MCHC RBC AUTO-ENTMCNC: 34.2 G/DL (ref 30–36.5)
MCV RBC AUTO: 84.8 FL (ref 80–99)
MONOCYTES # BLD: 0.7 K/UL (ref 0–1)
MONOCYTES NFR BLD: 7 % (ref 5–13)
NEUTS BAND NFR BLD MANUAL: 1 % (ref 0–6)
NEUTS SEG # BLD: 8.6 K/UL (ref 1.8–8)
NEUTS SEG NFR BLD: 86 % (ref 32–75)
NRBC # BLD: 0 K/UL (ref 0–0.01)
NRBC BLD-RTO: 0 PER 100 WBC
PLATELET # BLD AUTO: 375 K/UL (ref 150–400)
PMV BLD AUTO: 10.1 FL (ref 8.9–12.9)
POTASSIUM SERPL-SCNC: 3.7 MMOL/L (ref 3.5–5.1)
RBC # BLD AUTO: 4.73 M/UL (ref 4.1–5.7)
RBC MORPH BLD: ABNORMAL
SERVICE CMNT-IMP: ABNORMAL
SERVICE CMNT-IMP: NORMAL
SODIUM SERPL-SCNC: 134 MMOL/L (ref 136–145)
WBC # BLD AUTO: 9.9 K/UL (ref 4.1–11.1)

## 2021-12-02 PROCEDURE — 74011250637 HC RX REV CODE- 250/637: Performed by: STUDENT IN AN ORGANIZED HEALTH CARE EDUCATION/TRAINING PROGRAM

## 2021-12-02 PROCEDURE — 85025 COMPLETE CBC W/AUTO DIFF WBC: CPT

## 2021-12-02 PROCEDURE — 74011636637 HC RX REV CODE- 636/637: Performed by: STUDENT IN AN ORGANIZED HEALTH CARE EDUCATION/TRAINING PROGRAM

## 2021-12-02 PROCEDURE — 65660000000 HC RM CCU STEPDOWN

## 2021-12-02 PROCEDURE — 97535 SELF CARE MNGMENT TRAINING: CPT

## 2021-12-02 PROCEDURE — 99233 SBSQ HOSP IP/OBS HIGH 50: CPT | Performed by: FAMILY MEDICINE

## 2021-12-02 PROCEDURE — 85379 FIBRIN DEGRADATION QUANT: CPT

## 2021-12-02 PROCEDURE — 97116 GAIT TRAINING THERAPY: CPT

## 2021-12-02 PROCEDURE — 80048 BASIC METABOLIC PNL TOTAL CA: CPT

## 2021-12-02 PROCEDURE — 74011250637 HC RX REV CODE- 250/637: Performed by: INTERNAL MEDICINE

## 2021-12-02 PROCEDURE — 77010033711 HC HIGH FLOW OXYGEN

## 2021-12-02 PROCEDURE — 74011250636 HC RX REV CODE- 250/636: Performed by: STUDENT IN AN ORGANIZED HEALTH CARE EDUCATION/TRAINING PROGRAM

## 2021-12-02 PROCEDURE — 83615 LACTATE (LD) (LDH) ENZYME: CPT

## 2021-12-02 PROCEDURE — 82962 GLUCOSE BLOOD TEST: CPT

## 2021-12-02 PROCEDURE — 86140 C-REACTIVE PROTEIN: CPT

## 2021-12-02 PROCEDURE — 97110 THERAPEUTIC EXERCISES: CPT

## 2021-12-02 PROCEDURE — 83735 ASSAY OF MAGNESIUM: CPT

## 2021-12-02 PROCEDURE — 82728 ASSAY OF FERRITIN: CPT

## 2021-12-02 PROCEDURE — 36415 COLL VENOUS BLD VENIPUNCTURE: CPT

## 2021-12-02 RX ORDER — DEXAMETHASONE SODIUM PHOSPHATE 4 MG/ML
6 INJECTION, SOLUTION INTRA-ARTICULAR; INTRALESIONAL; INTRAMUSCULAR; INTRAVENOUS; SOFT TISSUE EVERY 24 HOURS
Status: DISCONTINUED | OUTPATIENT
Start: 2021-12-03 | End: 2021-12-04

## 2021-12-02 RX ORDER — DEXAMETHASONE SODIUM PHOSPHATE 4 MG/ML
6 INJECTION, SOLUTION INTRA-ARTICULAR; INTRALESIONAL; INTRAMUSCULAR; INTRAVENOUS; SOFT TISSUE EVERY 24 HOURS
Status: DISCONTINUED | OUTPATIENT
Start: 2021-12-02 | End: 2021-12-02

## 2021-12-02 RX ORDER — INSULIN LISPRO 100 [IU]/ML
40 INJECTION, SOLUTION INTRAVENOUS; SUBCUTANEOUS
Status: DISCONTINUED | OUTPATIENT
Start: 2021-12-02 | End: 2021-12-03

## 2021-12-02 RX ADMIN — INSULIN LISPRO 40 UNITS: 100 INJECTION, SOLUTION INTRAVENOUS; SUBCUTANEOUS at 12:28

## 2021-12-02 RX ADMIN — POLYETHYLENE GLYCOL 3350 17 G: 17 POWDER, FOR SOLUTION ORAL at 09:46

## 2021-12-02 RX ADMIN — INSULIN LISPRO 40 UNITS: 100 INJECTION, SOLUTION INTRAVENOUS; SUBCUTANEOUS at 17:15

## 2021-12-02 RX ADMIN — DILTIAZEM HYDROCHLORIDE 240 MG: 240 CAPSULE, COATED, EXTENDED RELEASE ORAL at 09:47

## 2021-12-02 RX ADMIN — TAMSULOSIN HYDROCHLORIDE 0.4 MG: 0.4 CAPSULE ORAL at 09:46

## 2021-12-02 RX ADMIN — ATORVASTATIN CALCIUM 40 MG: 20 TABLET, FILM COATED ORAL at 09:46

## 2021-12-02 RX ADMIN — OXYCODONE HYDROCHLORIDE AND ACETAMINOPHEN 500 MG: 500 TABLET ORAL at 17:15

## 2021-12-02 RX ADMIN — INSULIN LISPRO 10 UNITS: 100 INJECTION, SOLUTION INTRAVENOUS; SUBCUTANEOUS at 17:15

## 2021-12-02 RX ADMIN — INSULIN LISPRO 40 UNITS: 100 INJECTION, SOLUTION INTRAVENOUS; SUBCUTANEOUS at 09:46

## 2021-12-02 RX ADMIN — Medication 10 ML: at 17:16

## 2021-12-02 RX ADMIN — Medication 1 CAPSULE: at 09:47

## 2021-12-02 RX ADMIN — FAMOTIDINE 20 MG: 20 TABLET, FILM COATED ORAL at 09:47

## 2021-12-02 RX ADMIN — Medication 10 ML: at 22:06

## 2021-12-02 RX ADMIN — INSULIN GLARGINE 50 UNITS: 100 INJECTION, SOLUTION SUBCUTANEOUS at 09:45

## 2021-12-02 RX ADMIN — APIXABAN 5 MG: 5 TABLET, FILM COATED ORAL at 09:46

## 2021-12-02 RX ADMIN — OXYCODONE HYDROCHLORIDE AND ACETAMINOPHEN 500 MG: 500 TABLET ORAL at 09:46

## 2021-12-02 RX ADMIN — HYDROCHLOROTHIAZIDE 25 MG: 25 TABLET ORAL at 09:47

## 2021-12-02 RX ADMIN — INSULIN LISPRO 5 UNITS: 100 INJECTION, SOLUTION INTRAVENOUS; SUBCUTANEOUS at 22:05

## 2021-12-02 RX ADMIN — INSULIN LISPRO 10 UNITS: 100 INJECTION, SOLUTION INTRAVENOUS; SUBCUTANEOUS at 12:28

## 2021-12-02 RX ADMIN — APIXABAN 5 MG: 5 TABLET, FILM COATED ORAL at 17:15

## 2021-12-02 RX ADMIN — INSULIN HUMAN 60 UNITS: 100 INJECTION, SUSPENSION SUBCUTANEOUS at 09:46

## 2021-12-02 RX ADMIN — BARICITINIB 4 MG: 2 TABLET, FILM COATED ORAL at 09:46

## 2021-12-02 RX ADMIN — DEXAMETHASONE SODIUM PHOSPHATE 6 MG: 4 INJECTION, SOLUTION INTRAMUSCULAR; INTRAVENOUS at 09:47

## 2021-12-02 RX ADMIN — FAMOTIDINE 20 MG: 20 TABLET, FILM COATED ORAL at 17:15

## 2021-12-02 RX ADMIN — FUROSEMIDE 20 MG: 10 INJECTION, SOLUTION INTRAMUSCULAR; INTRAVENOUS at 09:47

## 2021-12-02 NOTE — PROGRESS NOTES
Called patient's spouse Mayito Silver to give her an update on his care.  All questions answered    Adonis Barroso, DO

## 2021-12-02 NOTE — DIABETES MGMT
This is an unvaccinated male with a history of  morbid obesity and Type 2 diabetes followed by Dr Major Grajeda on Lantus and metformin admitted with Dx of Covid pneumonia.  Presentation on admission low grade temperature, 88% O2 RA, Bilateral air space disease on CXR.   Pt admitted and treated per protocol including dexamethasone. Hospital course characterized by progressive SOB with increasing O2 requirements.     Over the last 24 hours, his insulin has been adjusted as recommended. He is now receiving 60 units of NPH insulin with each dose of dexamethasone in addition to 100 units of Lantus and Humalog 40 units with meals. Blood sugars remain elevated between 300-400. He continues on a 45 g carbohydrate diet with additional protein as directed. Dexamethasone has been reduced from 10 mg to 6 mg daily. Examination  Blood pressure 120/77  Pulse 73  Afebrile  92% on high flow, currently 20 L/min    Laboratory data  Creatinine 1.12  Potassium 3.7  Ferritin 488    CRP 0.51    Impression  1. Covid pneumonia on high flow O2 and per protocol  2. Type 2 diabetes mellitus with marked elevations in blood sugar exacerbated by glucocorticoids,     Recommendation  1. I would give an additional 20 units of NPH now and increase the NPH dose to 80 units linked with steroids  2. Continue Lantus 100 units daily  3. Continue the 40 units of mealtime insulin (essentially 1 unit for every 1 g of carbohydrate)  4.   We continue to follow with you    Total time 25 minutes

## 2021-12-02 NOTE — PROGRESS NOTES
Comprehensive Nutrition Assessment      Type and Reason for Visit: Initial, RD nutrition re-screen/LOS    Nutrition Recommendations/Plan:   1. Adjust diet to help meet estimated needs - add double proteins to meet kcal without increasing carbs. 2. Added Ensure HP BID for increased protein needs with large stature with lower Carbohydrate content  3. Adjust medication to improve BG- currently on steroids- A1c 8.1 (avg  mg/dL)      Nutrition Assessment:       Pt admitted for Acute hypoxemic respiratory failure due to COVID-19 ([facility] [U07.1, J96.01]. Pt  has a past medical history of Type II DM, Hypercholesterolemia, and Hypertension, Morbid obesity. Pt screened for LOS. Pt on droplet plus precautions. Attempted 2 phone calls, no answer. Documented PO intake has been excellent. Currently on 3 carb choice which only provides 1527 kcal, 96 g Pro and 142 g Carbs per day. Added Ensure HP BID (16g Pro, 19g CHO each) to help meet protein needs. Pt on decadron timed with NPH along with Lantus 100 units daily and Humalog 40 units at meals.  fasting this morning. Previously - 404, 367, 368 mg/dL. CRP trending down, WNL today. Ferritin slightly lower. Other labs reviewed. No hx of chew/swallow difficulties. NKFA. No c/d- last BM 12/1. Encourage outpt RD appointment and/or DM education after discharge if pt willing.      Patient Vitals for the past 168 hrs:   % Diet Eaten   12/01/21 1707 76 - 100%   12/01/21 1200 76 - 100%   12/01/21 0949 76 - 100%   11/30/21 1259 76 - 100%   11/30/21 0924 76 - 100%   11/29/21 2209 0%   11/29/21 1953 51 - 75%   11/29/21 1530 76 - 100%   11/27/21 0900 51 - 75%         Wt Readings from Last 10 Encounters:   12/02/21 (!) 172.2 kg (379 lb 9.6 oz)   08/06/21 (!) 176.9 kg (390 lb)   03/04/21 (!) 181.6 kg (400 lb 6.4 oz)   01/22/21 (!) 181 kg (399 lb)   08/18/20 (!) 173 kg (381 lb 6.3 oz)   05/30/20 158.8 kg (350 lb)   08/06/19 (!) 177.8 kg (392 lb)   01/27/14 (!) 162.5 kg (358 lb 3.2 oz)   07/18/13 156.5 kg (345 lb)   02/08/13 155.9 kg (343 lb 12.8 oz)       Malnutrition Assessment:  Malnutrition Status: At risk for malnutrition (specify)    Context:    COVID-19, low carb/calorie diet not meet estimated needs. Estimated Daily Nutrient Needs:  Energy (kcal): 7712 (MSJx1.2); Weight Used for Energy Requirements: Current  Protein (g): 130 (ideal x 1.5); Weight Used for Protein Requirements: Current  Fluid (ml/day): 3125 (1mL/kcal); Method Used for Fluid Requirements: 1 ml/kcal    Documented meal intake:   Patient Vitals for the past 168 hrs:   % Diet Eaten   12/01/21 1707 76 - 100%   12/01/21 1200 76 - 100%   12/01/21 0949 76 - 100%   11/30/21 1259 76 - 100%   11/30/21 0924 76 - 100%   11/29/21 2209 0%   11/29/21 1953 51 - 75%   11/29/21 1530 76 - 100%   11/27/21 0900 51 - 75%       Documented Supplement intake:  Patient Vitals for the past 168 hrs:   Supplement intake %   12/01/21 1707 0%   12/01/21 1200 0%   12/01/21 0949 0%   11/30/21 1259 0%   11/30/21 0924 0%       Nutrition Related Findings:     Last BM 12/1  Edema: BLE nonpitting, RUE trace       Nutritionally Significant Medications:   Eliquis, Vit C, Lipitor, Olumiant, Decadron w NPH, Pepcid, Lasix, Lantus, Humalog, Miralax, Flomax, Zinc.       Wounds:    None       Current Nutrition Therapies:  ADULT DIET Regular; 3 carb choices (45 gm/meal); No Concentrated Sweets  ADULT ORAL NUTRITION SUPPLEMENT PM Snack, AM Snack;  Low Calorie/High Protein    Anthropometric Measures:  · Height:  6' 2.02\" (188 cm)  · Current Body Wt:  172.2 kg (379 lb 10.1 oz)   · Admission Body Wt:   379    · Usual Body Wt:   375 lbs     · Ideal Body Wt:  190 lbs:  199.8 %    · BMI Category:  Obese class 3 (BMI 40.0 or greater)       Nutrition Diagnosis:   · Increased nutrient needs related to increased demand for energy/nutrients as evidenced by  (COVID-19)      Nutrition Interventions:   Food and/or Nutrient Delivery: Continue current diet, Start oral nutrition supplement  Nutrition Education and Counseling: Education needed  Coordination of Nutrition Care: Continue to monitor while inpatient, Interdisciplinary rounds    Goals:  Meet >50% of needs with wt maintanence within 3-5 days       Nutrition Monitoring and Evaluation:   Behavioral-Environmental Outcomes: None identified  Food/Nutrient Intake Outcomes: Food and nutrient intake, Supplement intake  Physical Signs/Symptoms Outcomes: Biochemical data, Weight    Discharge Planning:    Continue current diet     Electronically signed by Mendoza Lees, [address]     Contact: [phone]

## 2021-12-02 NOTE — PROGRESS NOTES
[facility]     Name: Lucendia Fabry MRN: [id number]   : 1960 Hospital: [facility]   Date: 2021        Impression Plan   1. Acute hypoxemic respiratory failure  2. COVID 19 PNA  3. Morbid obesity  4. Hx of DVT               · Wean O2 to keep sats above 90%; may be able to transition to midflow today  · Continue Baricitinib  · Dexamethasone weaned to 6mg daily  · OOB into chair  · Self prone at night for 3 hrs if possible  · Follow d-dimer  · Follow inflammatory marker  · Continue eliquis  · Encourage IS use         Radiology  (personally reviewed)  chest CTA: There is multifocal groundglass opacification and areas of consolidation throughout both lower lobes with additional involvement of the left upper lobe, right upper lobe, and right middle lobe. No PE. Subjective     Cc: shortness of breath    65 yo with morbid obesity, DVT, presenting with covid 19 PNA. Pt first diagnosed . Started to get more SOB in the last couple of days. O2 requirement has increased drastically in the last 24 hrs. Now up to 15 liters HF. Lifetime non-smoker. No hx of lung problems. Unvaccinated. A comprehensive review of systems was negative except for that written in the HPI. Interval History:  Afebrile  BP stable  Sats 95% on 20L/100% HFNC  D-dimer 0.40 - better   - better  Ferritin 488 - better  CRP 0.51 - better  Blood cultures no growth x 6 days    ROS: Continues to slowly improve. Denies SOB at rest with HF in place. Denies fever or chills. Denies CP. Denies abd pain or LE pain/swelling.      Past Medical History:   Diagnosis Date    AR (allergic rhinitis) 2009    Diabetes ([facility]     Dr. Rodas [place]    History of vascular access device 2020    4f single picc placed on r cephalic, 61HS at 3cm out placed by CARMEN Lowry, difficult limited access    Hypercholesterolemia     Hypertension       Past Surgical History:   Procedure Laterality Date    HX HEART CATHETERIZATION  2007    normal      Prior to Admission medications    Medication Sig Start Date End Date Taking? Authorizing Provider   dexAMETHasone (DECADRON) 6 mg tablet Take 1 Tablet by mouth Daily (before breakfast). Start morning of 11/28 11/27/21  Yes Charlee Yung MD   zinc sulfate (ZINCATE) 50 mg zinc (220 mg) capsule Take 1 Capsule by mouth daily. 11/27/21  Yes Charlee Yung MD   ascorbic acid, vitamin C, (VITAMIN C) 500 mg tablet Take 1 Tablet by mouth two (2) times a day. 11/27/21  Yes Charlee Yung MD   insulin glargine U-300 conc (Toujeo Max U-300 SoloStar) 300 unit/mL (3 mL) inpn 100 Units by SubCUTAneous route nightly. Yes Provider, Historical   apixaban (ELIQUIS) 5 mg tablet Take 5 mg by mouth two (2) times a day. Indications: blood clot in a deep vein of the extremities   Yes Provider, Historical   metoprolol succinate (TOPROL-XL) 50 mg XL tablet Take 50 mg by mouth daily. Yes Provider, Historical   insulin lispro (HUMALOG) 100 unit/mL kwikpen by SubCUTAneous route Before breakfast, lunch, and dinner. Sliding scale   Yes Provider, Historical   dilTIAZem ER (CARDIZEM LA) 240 mg Tb24 tablet Take 240 mg by mouth daily. Yes Provider, Historical   hydroCHLOROthiazide (HYDRODIURIL) 25 mg tablet Take 25 mg by mouth daily. Yes Provider, Historical   rosuvastatin (CRESTOR) 20 mg tablet Take 20 mg by mouth nightly. Yes Provider, Historical   ondansetron (ZOFRAN ODT) 4 mg disintegrating tablet Take 1 Tablet by mouth every eight (8) hours as needed for Nausea or Vomiting. Patient not taking: Reported on 11/26/2021 11/23/21   Aida Cain H, DO   triamcinolone acetonide (KENALOG) 0.1 % topical cream Apply  to affected area three (3) times daily as needed for Skin Irritation. use thin layer 8/6/21   Shamir Lockett, DO   atorvastatin (LIPITOR) 40 mg tablet  2/15/21   Provider, Historical   aspirin delayed-release 81 mg tablet Take 81 mg by mouth daily.   Patient not taking: Reported on 2021    Provider, Historical   metFORMIN (GLUCOPHAGE) 1,000 mg tablet Take 1,000 mg by mouth two (2) times daily (with meals).     Provider, Historical     Current Facility-Administered Medications   Medication Dose Route Frequency    insulin lispro (HUMALOG) injection 40 Units  40 Units SubCUTAneous TID WITH MEALS    dexamethasone (DECADRON) 4 mg/mL injection 6 mg  6 mg IntraVENous Q24H    And    insulin NPH (NOVOLIN N, HUMULIN N) injection 60 Units  60 Units SubCUTAneous DAILY    polyethylene glycol (MIRALAX) packet 17 g  17 g Oral DAILY    insulin glargine (LANTUS) injection 50 Units  50 Units SubCUTAneous DAILY    And    insulin glargine (LANTUS) injection 50 Units  50 Units SubCUTAneous DAILY    furosemide (LASIX) injection 20 mg  20 mg IntraVENous DAILY    baricitinib (OLUMIANT) tablet 4 mg  4 mg Oral DAILY    famotidine (PEPCID) tablet 20 mg  20 mg Oral BID    insulin lispro (HUMALOG) injection   SubCUTAneous QID WITH MEALS    sodium chloride (NS) flush 5-40 mL  5-40 mL IntraVENous Q8H    ascorbic acid (vitamin C) (VITAMIN C) tablet 500 mg  500 mg Oral BID    zinc sulfate (ZINCATE) 50 mg zinc (220 mg) capsule 1 Capsule  1 Capsule Oral DAILY    atorvastatin (LIPITOR) tablet 40 mg  40 mg Oral DAILY    dilTIAZem ER (CARDIZEM CD) capsule 240 mg  240 mg Oral DAILY    hydroCHLOROthiazide (HYDRODIURIL) tablet 25 mg  25 mg Oral DAILY    [Held by provider] metoprolol succinate (TOPROL-XL) XL tablet 50 mg  50 mg Oral DAILY    apixaban (ELIQUIS) tablet 5 mg  5 mg Oral BID    tamsulosin (FLOMAX) capsule 0.4 mg  0.4 mg Oral DAILY     No Known Allergies   Social History     Tobacco Use    Smoking status: Former Smoker     Packs/day: 1.00     Years: 15.00     Pack years: 15.00     Types: Cigarettes, Pipe, Cigars     Quit date: 3/12/1991     Years since quittin.7    Smokeless tobacco: Never Used   Substance Use Topics    Alcohol use: No      Family History   Problem Relation Age of Onset    Heart Failure Father     Diabetes Brother     Heart Failure Paternal Grandfather          age 37 AMI          Laboratory: I have personally reviewed the flowsheet and labs.      Recent Labs     21  0030 211 21  0455   WBC 9.9 8.5 8.9   HGB 13.7 13.0 12.9   HCT 40.1 38.3 38.6    341 315     Recent Labs     21  0030 21  0455   * 133* 135*   K 3.7 3.8 3.6   CL 97 99 100   CO2 27 27 27   * 404* 367*   BUN 28* 28* 28*   CREA 1.12 1.01 1.00   CA 8.5 8.4* 8.6   MG 2.1 2.4  2.5* 2.6*       Objective:     Visit Vitals  /81 (BP 1 Location: Right lower arm, BP Patient Position: Sitting)   Pulse (!) 59   Temp 97.8 °F (36.6 °C)   Resp 18   Ht 6' 2\" (1.88 m)   Wt (!) 172.2 kg (379 lb 9.6 oz)   SpO2 95%   BMI 48.74 kg/m²         Intake/Output Summary (Last 24 hours) at 2021 0844  Last data filed at 2021 2923  Gross per 24 hour   Intake 720 ml   Output 3430 ml   Net -2710 ml     EXAM:   GENERAL: obese, dyspnic with exertion, no distress HEENT:  anicteric, EOMI, no alar flaring or epistaxis, oral mucosa moist without cyanosis, NECK:  no jugular vein distention, no retractions, no thyromegaly or masses, LUNGS: Distant breathsounds bilaterally, HEART:  Regular rate and rhythm with no MGR; no edema is present, ABDOMEN:  soft with no tenderness, bowel sounds present, EXTREMITIES:  warm with no cyanosis, SKIN:  no jaundice or ecchymosis and NEUROLOGIC:  alert and oriented, grossly non-focal    MYNOR Shirley  [facility]

## 2021-12-02 NOTE — PROGRESS NOTES
[address] [facility] [address] [place] [address]   Office [phone]  Fax [phone]          Assessment and Plan     Sagrario Borjas is a 64 y.o. male with a PMH of HTN, DM2, HLD, chronic lymphedema, history of right femoral DVT in 2020 now on [facility] admitted for acute hypoxic respiratory failure 2/2 COVID PNA and lactic acidosis. Patient was admitted on 11/26/2021.    24-Hour Events: No acute events overnight. Acute hypoxic respiratory failure 2/2 COVID pneumonia: Pt with O2 sat of 88% at time of admission. Symptom onset 11/18. Presenting symptoms acutely worsened on date of admission, prompting presentation to the ED. In the ED, given Decadron IV 10mg, 1L NS, with symptomatic improvement. Pt met 1/4 SIRS criteria on admission, and CURB-65 1/5. Rapidly increasing O2 needs on 11/28 - prompting CTA chest - negative for PE. Started on Baricitinib on 11/28.  - Continue supplemental O2, wean as tolerated  - PT/OT consulted   - Decrease decadron to 6mg daily, due to uncontrolled BG.   - Discuss with RT for CPAP vs BiPap overnight   - Continue Baricitinib 4 mg daily (started 11/28)  - Lasix IV 20mg daily  - Pulmonology following, appreciate further recommendations  - COVID meds (Atorvastatin 40mg every day, Vitamin C 500mg BID, and Zinc 220mg every day)  - CBC with diff daily, COVID labs (CRP, D-dimer, ferritin, LD) daily     T2DM: Last A1c 7.9% in 09/2020. On Lantus 100u QHS, SSI Lispro, Metformin 1000mg BID at home. - Endocrinology consulted, appreciate recs. Recommending no change in current insulin regimen over to watch BG over the next day. - Lantus 100u daily   - Lispro 40 units TID w/ meals  - NPH 60u linked with Dexamethasone  - SSI  - POC BG ACHS  - Hypoglycemia protocols ordered     History of DVT: On Eliquis at home. F/b Dr. Ryan Dominguez. Unclear etiology - not provoked per Dr. Gwendolyn Greenberg note. Denies fh/o VTE that pt is aware of.  Per chart review, pt started on Xarelto, however pt reportedly taking Eliquis per medication reconciliation with wife over phone.  - Continue Eliquis 5mg BID  - Up and out of bed as tolerated     Hypokalemia: POA 3.1. Wnl.  - BMP and Mg daily  - Replete as needed  - Patient on remote tele    AIDEE: Recommend BiPAP at night.      HLD: Last lipid panel 08/2020 with , HDL 43, LDL 99, . On Rosuvastatin 20mg daily at home. - Will substitute with Atorvastatin 40mg daily here     HTN: POA /68. On Metoprolol XL 50mg daily, Diltiazem 240mg daily, and HCTZ 25mg daily. - Will continue BP meds  - Continue IV Lasix 20mg daily  - Monitor and adjust as required    Constipation: Patient states no bowel movement in over a week. - KUB for possible obstruction  - Scheduled bowel regimen after KUB     Chronic lymphedema: Chronic venous stasis and 3+ pitting edema noted on exam. Pt states he uses compression stockings at home. - Compression stockings ordered  - Lasix as above  - Elevate lower extremities     BPH: Chronic, stable. - Continue Alfuzosin 10mg daily - pharm to substitute     Obesity:  - The pt's Body mass index is 48.15 kg/m². - Encouraging lifestyle modifications and further follow up outpatient. Dysuria: Noted per pt. Likely 2/2 decreased UOP in setting of poor PO intake. UA now without evidence of infection.  - Continue to monitor VS, labs, and symptoms  - Bladder checks and straight cath PRN    Lactic acidosis: DOWNTRENDED. POA 3.0 -> 2.4 -> 2.2 -> 2.5 -> 3.0 -> 3.2 -> 2.8 -> 2.9 -> 2.1. Concern for PE given, O2 requirement. - Stopped trending   - BMP daily    Troponinemia (resolved): Troponin of 15 -> 22 -> 15. Pt without chest pain or tightness. Low concern for ACS. Per chart review, last [facility] 2007 was wnl.  - Will continue to monitor symptoms, electrolytes  - Patient on remote cardiac monitoring       FEN/GI - Diabetic diet.   Activity - Out of bed with assistance  DVT prophylaxis - Eliquis  GI prophylaxis - Pepcid 20 BID  Fall prophylaxis - Fall precautions ordered. Disposition - Admit to Remote Telemetry. PT recommends 2 days/week home PT  Code Status - Partial (DNI). Discussed with patient / caregivers. Next of Katarina 69 Name and Krish Schroeder [address] Ryley Read - [phone]    Maximilian Lake MD  Family Medicine Resident           Subjective / Objective       Patient resting comfortably in no acute distress, on HiFlow at 31. No chest pain or dizziness. Respiratory: O2 Flow Rate (L/min): 30 l/min O2 Device: Heated, Hi flow nasal cannula   Visit Vitals  /73 (BP 1 Location: Right upper arm, BP Patient Position: Semi fowlers)   Pulse 66   Temp 97.9 °F (36.6 °C)   Resp 24   Ht 6' 2\" (1.88 m)   Wt (!) 379 lb 9.6 oz (172.2 kg)   SpO2 94%   BMI 48.74 kg/m²       General: No acute distress. Alert. Cooperative. Obese male positioned comfortably in bed  Respiratory: Fine bibasilar crackles, distal lung sounds  Cardiovascular: Regular rate and rhythm. Distal heart sounds. GI: + bowel sounds. Nontender. No rebound tenderness or guarding. Protuberant abdomen  Extremities:  2+ pitting LE edema bilateral lower extremities w/ chronic venous stasis changes - improved  Skin: Warm, dry. Lymphedema in bilateral LE. Neuro: Awake, alert, and appropriately conversant. I/O:  Date 12/01/21 0700 - 12/02/21 0659 12/02/21 0700 - 12/03/21 0659   Shift 8120-3643 6254-8820 24 Hour Total 8367-5314 8537-1985 24 Hour Total   INTAKE   P.O. 720  720        P. O. 720  720      I. V.(mL/kg/hr) 0(0)  0        I.V. 0  0      Blood 0  0        Autotransfused 0  0      Other 0  0        Other 0  0      Shift Total(mL/kg) 720(4.2)  720(4.2)      OUTPUT   Urine(mL/kg/hr) 2800(1.4) 280 3080        Urine Voided 2800 280 3080        Urine Occurrence(s) 1 x  1 x      Stool           Stool Occurrence(s) 1 x  1 x      Shift Total(mL/kg) 7244(62.6) 280(1.6) 8180(02.1)      NET -7693 -428 -5932      Weight (kg) 171.9 172.2 172.2 172.2 172.2 172.2       CBC:  Recent Labs     12/02/21  0030 12/01/21  0431 11/30/21  0455 WBC 9.9 8.5 8.9   HGB 13.7 13.0 12.9   HCT 40.1 38.3 38.6    341 415       Metabolic Panel:  Recent Labs     12/02/21  0030 12/01/21  0431 11/30/21  0455   * 133* 135*   K 3.7 3.8 3.6   CL 97 99 100   CO2 27 27 27   BUN 28* 28* 28*   CREA 1.12 1.01 1.00   * 404* 367*   CA 8.5 8.4* 8.6   MG 2.1 2.4  2.5* 2.6*          For Billing    Chief Complaint   Patient presents with   [facility] Problems  Date Reviewed: 11/28/2021          Codes Class Noted POA    Lymphedema ICD-10-CM: I89.0  ICD-9-CM: 457.1  11/27/2021 Yes        BPH (benign prostatic hyperplasia) ICD-10-CM: N40.0  ICD-9-CM: 600.00  11/27/2021 Yes        Elevated troponin ICD-10-CM: R77.8  ICD-9-CM: 790.6  11/27/2021 Yes        History of DVT of lower extremity ICD-10-CM: Z86.718  ICD-9-CM: V12.51  11/27/2021 Yes        * (Principal) Acute hypoxemic respiratory failure due to COVID-19 [facility]) ICD-10-CM: U07.1, J96.01  ICD-9-CM: 518.81, 079.89, 799.02  11/26/2021 Yes        Lactic acidosis ICD-10-CM: E87.2  ICD-9-CM: 276.2  8/15/2020 Yes        Hyperlipemia (Chronic) ICD-10-CM: E78.5  ICD-9-CM: 272.4  3/12/2009 Yes        HTN (hypertension) (Chronic) ICD-10-CM: I10  ICD-9-CM: 401.9  3/12/2009 Yes        Type 2 diabetes mellitus with hyperglycemia, with long-term current use of insulin (HCC) ICD-10-CM: E11.65, Z79.4  ICD-9-CM: 250.00, 790.29, V58.67  3/12/1999 Yes

## 2021-12-02 NOTE — PROGRESS NOTES
Problem: Mobility Impaired (Adult and Pediatric)  Goal: *Acute Goals and Plan of Care (Insert Text)  Description: FUNCTIONAL STATUS PRIOR TO ADMISSION: Patient was independent and active without use of DME.    HOME SUPPORT PRIOR TO ADMISSION: The patient lived with wife but did not require assist.    Physical Therapy Goals  Initiated 11/27/2021; reviewed 11/30/21 and remain appropriate  1. Patient will move from supine to sit and sit to supine  in bed with independence within 7 day(s). 2.  Patient will transfer from bed to chair and chair to bed with independence using the least restrictive device within 7 day(s). 3.  Patient will perform sit to stand with independence within 7 day(s). 4.  Patient will ambulate with independence for 150 feet with the least restrictive device within 7 day(s). Outcome: Progressing Towards Goal   PHYSICAL THERAPY TREATMENT  Patient: Olga Carter (89 y.o. male)  Date: 12/2/2021  Diagnosis: Acute hypoxemic respiratory failure due to COVID-19 ([facility]) [U07.1, J96.01]   Acute hypoxemic respiratory failure due to COVID-19 [facility])       Precautions: Contact (COVID; Dropet plus)  Chart, physical therapy assessment, plan of care and goals were reviewed. ASSESSMENT  Patient continues with skilled PT services and is progressing towards goals. Patient demonstrated improved activity tolerance today, completing bathing with wipes in standing along with gait x 48' prior to resting. SpO2 remained > 90% on 20L HFNC and patient did not exhibit anything more than mild SOB after this activity. Patient declining to sit up in chair due to requirement from nursing staff of placement of chair alarm pad. Prefers to sit at EOB and instructed patient to complete his seated therex there instead. Patient safe to be up to/from chair to bed and manage his lines independently. Discussed with RN. Cont to benefit from skilled PT and demonstrating steady progress.      Current Level of Function Impacting Discharge (mobility/balance): SBA gait and transfers in room    Other factors to consider for discharge:          PLAN :  Patient continues to benefit from skilled intervention to address the above impairments. Continue treatment per established plan of care. to address goals. Recommendation for discharge: (in order for the patient to meet his/her long term goals)  To be determined: pulmonary rehab vs HH vs none    This discharge recommendation:  Has been made in collaboration with the attending provider and/or case management    IF patient discharges home will need the following DME: to be determined (TBD)       SUBJECTIVE:   Patient stated I just cannot tolerate that alarm going off in the chair if I just shift my weight.     OBJECTIVE DATA SUMMARY:   Critical Behavior:  Neurologic State: Alert  Orientation Level: Oriented X4  Cognition: Follows commands  Safety/Judgement: Awareness of environment  Functional Mobility Training:  Bed Mobility:     Supine to Sit: Modified independent     Scooting: Supervision        Transfers:  Sit to Stand: Supervision; Stand-by assistance  Stand to Sit: Supervision        Bed to Chair: Supervision; Stand-by assistance                    Balance:  Sitting: Intact  Standing: Without support  Standing - Static: Good  Standing - Dynamic : Fair  Ambulation/Gait Training:  Distance (ft): 48 Feet (ft)  Assistive Device: Gait belt  Ambulation - Level of Assistance: Stand-by assistance        Gait Abnormalities: Decreased step clearance        Base of Support: Widened     Speed/Jadyn: Pace decreased (<100 feet/min)  Step Length: Right shortened; Left shortened             Pain Rating:  None     Activity Tolerance:   Fair, desaturates with exertion and requires oxygen, and requires frequent rest breaks    After treatment patient left in no apparent distress:   Call bell within reach, Bed / chair alarm activated, and sitting EOB    COMMUNICATION/COLLABORATION:   The patients plan of care was discussed with: Occupational therapist and Registered nurse.      Mick Thomas, PT   Time Calculation: 24 mins

## 2021-12-02 NOTE — PROGRESS NOTES
0700 Bedside and Verbal shift change report given to Parul Jaffe (oncoming nurse) by Pearl Britton (offgoing nurse). Report included the following information SBAR, Kardex, ED Summary, Intake/Output, MAR, Recent Results and Cardiac Rhythm SB/NSR. This patient was assisted with Intentional Toileting every 2 hours during this shift as appropriate. Documentation of ambulation and output reflected on Flowsheet as appropriate. Purposeful hourly rounding was completed using AIDET and 5Ps. Outcomes of PHR documented as they occurred. Bed alarm in use as appropriate. Dual Suction and ambubag in place. 0940 Pt , Notified KARIN HENRY, Orders received to give scheduled insulin lispro and hold sliding scale insulin. Will recheck again at lunch time. 1228 Pt , Notified KARIN HENRY, orders received to give 10 units of sliding scale insulin in addition to scheduled insulin Lispro. 1715 Pt , notified KARIN HENRY, orders received to give 10 units of sliding scale insulin in addition to schedule insulin Lispro. 1930 Bedside and Verbal shift change report given to Pearl Britton (oncoming nurse) by Hyacinth Rinne RN (offgoing nurse). Report included the following information SBAR, Kardex, ED Summary, Intake/Output, MAR, Recent Results and Cardiac Rhythm SB/NSR.

## 2021-12-02 NOTE — PROGRESS NOTES
Problem: Self Care Deficits Care Plan (Adult)  Goal: *Acute Goals and Plan of Care (Insert Text)  Description: FUNCTIONAL STATUS PRIOR TO ADMISSION: Patient was independent and active without use of DME.     HOME SUPPORT: The patient lived with wife but did not require assist.    Occupational Therapy Goals  Initiated 11/27/2021; goals reviewed 12/2/2021, upgrade all goals to be completed with SpO2 >90%. 1.  Patient will perform grooming standing with supervision/set-up within 7 day(s). 2.  Patient will perform bathing with supervision/set-up within 7 day(s). 3.  Patient will perform lower body dressing with supervision/set-up within 7 day(s). 4.  Patient will perform toilet transfers with supervision/set-up within 7 day(s). 5.  Patient will participate in upper extremity therapeutic exercise/activities with supervision/set-up for 5 minutes within 7 day(s). 6.  Patient will utilize energy conservation techniques during functional activities with verbal cues within 7 day(s). Outcome: Progressing Towards Goal     OCCUPATIONAL THERAPY TREATMENT/WEEKLY RE-ASSESSMENT  Patient: Hawk Garza (53 y.o. male)  Date: 12/2/2021  Diagnosis: Acute hypoxemic respiratory failure due to COVID-19 ([facility]) [U07.1, J96.01]   Acute hypoxemic respiratory failure due to COVID-19 [facility])       Precautions: Contact (COVID; Dropet plus)  Chart, occupational therapy assessment, plan of care, and goals were reviewed. ASSESSMENT  Patient continues with skilled OT services and is progressing towards goals. Patient today received on 20L via HFNC, sitting EOB, agreeable to participate. He participates in standing bathing tasks with overall SPV/SBA this session, with stable SpO2 >90%. He tolerates prolonged standing for ADLs with minimal SOB and is motivated to progress mobility towards bathroom this session, although unable to reach due to short lines/highflow tubing.  He is motivated to participate and offers excellent efforts, however he declines OOB sitting due to chair alarm placement, requesting instead to sit up EOB. Pt continues to benefit from skilled OT services in acute setting. Will continue to follow per POC and recommend pulmonary rehab vs. None pending progress and pulmonary status. Current Level of Function Impacting Discharge (ADLs): SPV to SBA for OOB ADLs    Other factors to consider for discharge: high flow O2 needs         PLAN :  Goals have been updated based on progression since last assessment. Patient continues to benefit from skilled intervention to address the above impairments. Continue to follow patient 3 times a week to address goals. Recommend with staff: OOB to chair for meals; BSC for toileting; ADLs with SBA    Recommend next OT session: UE HEP with handout    Recommendation for discharge: (in order for the patient to meet his/her long term goals)  To be determined: pulmonary rehab vs. none pending pulmonary status    This discharge recommendation:  A follow-up discussion with the attending provider and/or case management is planned    IF patient discharges home will need the following DME: TBD       SUBJECTIVE:   Patient stated I like to be able to do my exercises and if I do them in the chair that thing [alarm] is constantly going off. I don't want to be trapped to the chair, it's hard enough with having the short lines.     OBJECTIVE DATA SUMMARY:   Cognitive/Behavioral Status:  Neurologic State: Alert  Orientation Level: Oriented X4  Cognition: Follows commands  Perception: Appears intact  Perseveration: No perseveration noted  Safety/Judgement: Awareness of environment    Functional Mobility and Transfers for ADLs:  Bed Mobility:  Supine to Sit: Modified independent  Scooting: Supervision    Transfers:  Sit to Stand: Supervision; Stand-by assistance  Functional Transfers  Toilet Transfer : Supervision; Stand-by assistance;  Other (comment) (for SPT to [facility])  Cues: Verbal cues provided  Bed to Chair: Supervision; Stand-by assistance    Balance:  Sitting: Intact  Standing: Without support  Standing - Static: Good  Standing - Dynamic : Fair    ADL Intervention:  Upper Body Bathing  Bathing Assistance: Supervision; Stand-by assistance  Position Performed: Standing  Cues: Verbal cues provided    Lower Body Bathing  Bathing Assistance: Stand-by assistance  Perineal  : Stand-by assistance  Position Performed: Standing  Cues: Verbal cues provided    Upper Body [address]: Set-up; Supervision  Cues: Verbal cues provided    Toileting  Bladder Hygiene: Contact guard assistance (standing)  Bowel Hygiene: Maximum assistance (standing)  Clothing Management: Contact guard assistance  Cues: Verbal cues provided  Adaptive Equipment:  [facility])    Cognitive Retraining  Safety/Judgement: Awareness of environment    Instructed patient to increase time sitting OOB in chair for pulmonary hygiene, skin integrity, and to increase endurance in preparation for ADLs. Instructed patient to sit OOB for all meals. Patient verbalized understanding of same. Instructed pt on pursed lip breathing (PLB) to assist with maintaining oxygen saturations >90% during activity and at rest. Pt instructed to inhale through nose and exhale through mouth while creating \"O\" shape with verbal, tactile, and visual cues provided to increase clarity and carry-over of technique. Instructed pt to complete focused PLB during rest breaks in order to increase ease while performing functional tasks. Functional Outcome Measure:    Barthel Index:  Bathin  Bladder: 10  Bowels: 10  Groomin  Dressin  Feeding: 10  Mobility: 0  Stairs: 0  Toilet Use: 5  Transfer (Bed to Chair and Back): 10  Total: 55/100      The Barthel ADL Index: Guidelines  1. The index should be used as a record of what a patient does, not as a record of what a patient could do.   2. The main aim is to establish degree of independence from any help, physical or verbal, however minor and for whatever reason. 3. The need for supervision renders the patient not independent. 4. A patient's performance should be established using the best available evidence. Asking the patient, friends/relatives and nurses are the usual sources, but direct observation and common sense are also important. However direct testing is not needed. 5. Usually the patient's performance over the preceding 24-48 hours is important, but occasionally longer periods will be relevant. 6. Middle categories imply that the patient supplies over 50 per cent of the effort. 7. Use of aids to be independent is allowed. Score Interpretation (from [address] [facility] 83)    Independent   60-79 Minimally independent   40-59 Partially dependent   20-39 Very dependent   <20 Totally dependent     -Jeanne Lucero., Barthel, D.W. (1965). Functional evaluation: the Barthel Index. [address] [facility] ([address] [place] [address] (1997). The Barthel activities of daily living index: self-reporting versus actual performance in the old (> or = 75 years). Journal [address] 45(7), 14 [facility], ..Michelle, Alberta Sy., [facility]. (1999). Measuring the change in disability after inpatient rehabilitation; comparison of the responsiveness of the Barthel Index and Functional [place] Measure. Journal of Neurology, Neurosurgery, and Psychiatry, 66(4), 833-550. Marisel Barriga [email or web address], NANCIE Grijalva, & Bro Pal MMichelleA. (2004) Assessment of post-stroke quality of life in cost-effectiveness studies: The usefulness of the Barthel Index and the EuroQoL-5D.  Quality of Life Research, 13, 427-43     Pain:  Pt reporting no pain    Activity Tolerance:   Fair, desaturates with exertion and requires oxygen and requires rest breaks    After treatment patient left in no apparent distress:   Call bell within reach, Bed / chair alarm activated and seated EOB    COMMUNICATION/COLLABORATION: The patients plan of care was discussed with: Physical therapist and Registered nurse.      Mehul Lazo, OT  Time Calculation: 26 mins

## 2021-12-02 NOTE — PROGRESS NOTES
Care Management follow up, LOS 6 days     Patient admitted for increased shortness of breath, cough.  COVID+, pneumonia. History of: HTN, diabetes, hyperlipidemia, chronic lymphedema, right DVT. COVID Vaccine:  no       RUR 9 (Score %) low    Is This a Readmission NO  Is this a Bundle NO     Current status  Patient discussed during interdisciplinary rounds. Patient continues to require medical management including ongoing assessment and monitoring.  Continues on high flow oxygen at 20L/100%, weaning as tolerated. Started Baricitinib 11/28/21.      Transition of Care Plan  · Monitor patient status and response to treatment. · Patient continues to require medical management. · Unsure of DC needs at this time, independent prior to admission. · Will monitor for home oxygen needs closer to DC.   · CM to monitor progress and recommendations.     Malcolm Qureshi RN, MSN/Care manager  [phone]

## 2021-12-03 PROBLEM — R79.89 POSITIVE D DIMER: Status: ACTIVE | Noted: 2021-12-03

## 2021-12-03 PROBLEM — K59.00 CONSTIPATION: Status: ACTIVE | Noted: 2021-12-03

## 2021-12-03 LAB
ANION GAP SERPL CALC-SCNC: 10 MMOL/L (ref 5–15)
BASOPHILS # BLD: 0 K/UL (ref 0–0.1)
BASOPHILS NFR BLD: 0 % (ref 0–1)
BUN SERPL-MCNC: 26 MG/DL (ref 6–20)
BUN/CREAT SERPL: 25 (ref 12–20)
CALCIUM SERPL-MCNC: 8.6 MG/DL (ref 8.5–10.1)
CHLORIDE SERPL-SCNC: 97 MMOL/L (ref 97–108)
CO2 SERPL-SCNC: 28 MMOL/L (ref 21–32)
CREAT SERPL-MCNC: 1.06 MG/DL (ref 0.7–1.3)
CRP SERPL-MCNC: <0.29 MG/DL (ref 0–0.6)
D DIMER PPP FEU-MCNC: 0.35 MG/L FEU (ref 0–0.65)
DIFFERENTIAL METHOD BLD: ABNORMAL
EOSINOPHIL # BLD: 0.1 K/UL (ref 0–0.4)
EOSINOPHIL NFR BLD: 1 % (ref 0–7)
ERYTHROCYTE [DISTWIDTH] IN BLOOD BY AUTOMATED COUNT: 14.2 % (ref 11.5–14.5)
FERRITIN SERPL-MCNC: 477 NG/ML (ref 26–388)
GLUCOSE BLD STRIP.AUTO-MCNC: 264 MG/DL (ref 65–117)
GLUCOSE BLD STRIP.AUTO-MCNC: 286 MG/DL (ref 65–117)
GLUCOSE BLD STRIP.AUTO-MCNC: 320 MG/DL (ref 65–117)
GLUCOSE BLD STRIP.AUTO-MCNC: 329 MG/DL (ref 65–117)
GLUCOSE SERPL-MCNC: 239 MG/DL (ref 65–100)
HCT VFR BLD AUTO: 42.9 % (ref 36.6–50.3)
HGB BLD-MCNC: 14.6 G/DL (ref 12.1–17)
IMM GRANULOCYTES # BLD AUTO: 0 K/UL
IMM GRANULOCYTES NFR BLD AUTO: 0 %
LDH SERPL L TO P-CCNC: 352 U/L (ref 85–241)
LYMPHOCYTES # BLD: 1.9 K/UL (ref 0.8–3.5)
LYMPHOCYTES NFR BLD: 18 % (ref 12–49)
MAGNESIUM SERPL-MCNC: 2.2 MG/DL (ref 1.6–2.4)
MCH RBC QN AUTO: 29.2 PG (ref 26–34)
MCHC RBC AUTO-ENTMCNC: 34 G/DL (ref 30–36.5)
MCV RBC AUTO: 85.8 FL (ref 80–99)
METAMYELOCYTES NFR BLD MANUAL: 1 %
MONOCYTES # BLD: 0.9 K/UL (ref 0–1)
MONOCYTES NFR BLD: 9 % (ref 5–13)
MYELOCYTES NFR BLD MANUAL: 1 %
NEUTS BAND NFR BLD MANUAL: 1 % (ref 0–6)
NEUTS SEG # BLD: 7.4 K/UL (ref 1.8–8)
NEUTS SEG NFR BLD: 69 % (ref 32–75)
NRBC # BLD: 0 K/UL (ref 0–0.01)
NRBC BLD-RTO: 0 PER 100 WBC
PLATELET # BLD AUTO: 424 K/UL (ref 150–400)
PMV BLD AUTO: 10.2 FL (ref 8.9–12.9)
POTASSIUM SERPL-SCNC: 3.5 MMOL/L (ref 3.5–5.1)
RBC # BLD AUTO: 5 M/UL (ref 4.1–5.7)
RBC MORPH BLD: ABNORMAL
SERVICE CMNT-IMP: ABNORMAL
SODIUM SERPL-SCNC: 135 MMOL/L (ref 136–145)
WBC # BLD AUTO: 10.5 K/UL (ref 4.1–11.1)

## 2021-12-03 PROCEDURE — 74011636637 HC RX REV CODE- 636/637: Performed by: STUDENT IN AN ORGANIZED HEALTH CARE EDUCATION/TRAINING PROGRAM

## 2021-12-03 PROCEDURE — 65660000000 HC RM CCU STEPDOWN

## 2021-12-03 PROCEDURE — 74011250637 HC RX REV CODE- 250/637: Performed by: STUDENT IN AN ORGANIZED HEALTH CARE EDUCATION/TRAINING PROGRAM

## 2021-12-03 PROCEDURE — 74011250636 HC RX REV CODE- 250/636: Performed by: STUDENT IN AN ORGANIZED HEALTH CARE EDUCATION/TRAINING PROGRAM

## 2021-12-03 PROCEDURE — 74011250637 HC RX REV CODE- 250/637: Performed by: INTERNAL MEDICINE

## 2021-12-03 PROCEDURE — 86140 C-REACTIVE PROTEIN: CPT

## 2021-12-03 PROCEDURE — 94660 CPAP INITIATION&MGMT: CPT

## 2021-12-03 PROCEDURE — 85379 FIBRIN DEGRADATION QUANT: CPT

## 2021-12-03 PROCEDURE — 77010033711 HC HIGH FLOW OXYGEN

## 2021-12-03 PROCEDURE — 36415 COLL VENOUS BLD VENIPUNCTURE: CPT

## 2021-12-03 PROCEDURE — 83735 ASSAY OF MAGNESIUM: CPT

## 2021-12-03 PROCEDURE — 82728 ASSAY OF FERRITIN: CPT

## 2021-12-03 PROCEDURE — 85025 COMPLETE CBC W/AUTO DIFF WBC: CPT

## 2021-12-03 PROCEDURE — 5A09457 ASSISTANCE WITH RESPIRATORY VENTILATION, 24-96 CONSECUTIVE HOURS, CONTINUOUS POSITIVE AIRWAY PRESSURE: ICD-10-PCS | Performed by: FAMILY MEDICINE

## 2021-12-03 PROCEDURE — 83615 LACTATE (LD) (LDH) ENZYME: CPT

## 2021-12-03 PROCEDURE — 80048 BASIC METABOLIC PNL TOTAL CA: CPT

## 2021-12-03 PROCEDURE — 97530 THERAPEUTIC ACTIVITIES: CPT

## 2021-12-03 PROCEDURE — 99233 SBSQ HOSP IP/OBS HIGH 50: CPT | Performed by: FAMILY MEDICINE

## 2021-12-03 PROCEDURE — 82962 GLUCOSE BLOOD TEST: CPT

## 2021-12-03 RX ORDER — INSULIN LISPRO 100 [IU]/ML
50 INJECTION, SOLUTION INTRAVENOUS; SUBCUTANEOUS
Status: DISCONTINUED | OUTPATIENT
Start: 2021-12-03 | End: 2021-12-04

## 2021-12-03 RX ADMIN — INSULIN LISPRO 50 UNITS: 100 INJECTION, SOLUTION INTRAVENOUS; SUBCUTANEOUS at 09:35

## 2021-12-03 RX ADMIN — INSULIN LISPRO 7 UNITS: 100 INJECTION, SOLUTION INTRAVENOUS; SUBCUTANEOUS at 09:38

## 2021-12-03 RX ADMIN — HYDROCHLOROTHIAZIDE 25 MG: 25 TABLET ORAL at 09:36

## 2021-12-03 RX ADMIN — BARICITINIB 4 MG: 2 TABLET, FILM COATED ORAL at 09:36

## 2021-12-03 RX ADMIN — Medication 1 CAPSULE: at 09:36

## 2021-12-03 RX ADMIN — OXYCODONE HYDROCHLORIDE AND ACETAMINOPHEN 500 MG: 500 TABLET ORAL at 09:36

## 2021-12-03 RX ADMIN — FAMOTIDINE 20 MG: 20 TABLET, FILM COATED ORAL at 09:36

## 2021-12-03 RX ADMIN — INSULIN GLARGINE 50 UNITS: 100 INJECTION, SOLUTION SUBCUTANEOUS at 09:38

## 2021-12-03 RX ADMIN — DILTIAZEM HYDROCHLORIDE 240 MG: 240 CAPSULE, COATED, EXTENDED RELEASE ORAL at 09:36

## 2021-12-03 RX ADMIN — TAMSULOSIN HYDROCHLORIDE 0.4 MG: 0.4 CAPSULE ORAL at 09:36

## 2021-12-03 RX ADMIN — INSULIN LISPRO 7 UNITS: 100 INJECTION, SOLUTION INTRAVENOUS; SUBCUTANEOUS at 12:41

## 2021-12-03 RX ADMIN — APIXABAN 5 MG: 5 TABLET, FILM COATED ORAL at 09:36

## 2021-12-03 RX ADMIN — OXYCODONE HYDROCHLORIDE AND ACETAMINOPHEN 500 MG: 500 TABLET ORAL at 17:05

## 2021-12-03 RX ADMIN — INSULIN LISPRO 50 UNITS: 100 INJECTION, SOLUTION INTRAVENOUS; SUBCUTANEOUS at 17:04

## 2021-12-03 RX ADMIN — INSULIN LISPRO 50 UNITS: 100 INJECTION, SOLUTION INTRAVENOUS; SUBCUTANEOUS at 12:41

## 2021-12-03 RX ADMIN — FAMOTIDINE 20 MG: 20 TABLET, FILM COATED ORAL at 17:05

## 2021-12-03 RX ADMIN — INSULIN HUMAN 80 UNITS: 100 INJECTION, SUSPENSION SUBCUTANEOUS at 09:35

## 2021-12-03 RX ADMIN — ATORVASTATIN CALCIUM 40 MG: 20 TABLET, FILM COATED ORAL at 09:36

## 2021-12-03 RX ADMIN — POLYETHYLENE GLYCOL 3350 17 G: 17 POWDER, FOR SOLUTION ORAL at 09:33

## 2021-12-03 RX ADMIN — Medication 10 ML: at 16:02

## 2021-12-03 RX ADMIN — INSULIN LISPRO 5 UNITS: 100 INJECTION, SOLUTION INTRAVENOUS; SUBCUTANEOUS at 22:38

## 2021-12-03 RX ADMIN — INSULIN GLARGINE 50 UNITS: 100 INJECTION, SOLUTION SUBCUTANEOUS at 09:34

## 2021-12-03 RX ADMIN — INSULIN LISPRO 10 UNITS: 100 INJECTION, SOLUTION INTRAVENOUS; SUBCUTANEOUS at 17:05

## 2021-12-03 RX ADMIN — FUROSEMIDE 20 MG: 10 INJECTION, SOLUTION INTRAMUSCULAR; INTRAVENOUS at 09:34

## 2021-12-03 RX ADMIN — DEXAMETHASONE SODIUM PHOSPHATE 6 MG: 4 INJECTION, SOLUTION INTRAMUSCULAR; INTRAVENOUS at 09:33

## 2021-12-03 RX ADMIN — APIXABAN 5 MG: 5 TABLET, FILM COATED ORAL at 17:05

## 2021-12-03 NOTE — PROGRESS NOTES
[address] [facility] [address] [place] [address]   Office [phone]  Fax [phone]          Assessment and Plan     Annie Carl is a 64 y.o. male with a PMH of HTN, DM2, HLD, chronic lymphedema, history of right femoral DVT in 2020 now on [facility] admitted for acute hypoxic respiratory failure 2/2 COVID PNA and lactic acidosis. Patient was admitted on 11/26/2021.    24-Hour Events: No acute events overnight. Wore BiPap approx 5 hours and did well. Acute hypoxic respiratory failure 2/2 COVID pneumonia: Pt with O2 sat of 88% at time of admission. Symptom onset 11/18. Rapidly increasing O2 needs on 11/28, CTA neg for PE. Started on Baricitinib on 11/28. - Currently on O2 7L, sats 90%  - PT/OT consulted   - Decadron to 6mg daily, due to uncontrolled BG. - BiPap overnight   - Continue Baricitinib 4 mg daily (started 11/28)  - Lasix IV 20mg daily  - Pulmonology following, appreciate further recommendations  - COVID meds (Atorvastatin 40mg every day, Vitamin C 500mg BID, and Zinc 220mg every day)  - CBC with diff daily, COVID labs (CRP, D-dimer, ferritin, LD) daily, currently downtrending     T2DM: Last A1c 7.9% in 09/2020. On Lantus 100u QHS, SSI Lispro, Metformin 1000mg BID at home. - Endocrinology consulted, appreciate recs. - Lantus 100u daily   - Lispro 50 units TID w/ meals  - NPH 80u linked with Dexamethasone  - SSI / ACHS, Hypoglycemia protocols ordered     History of DVT: On Eliquis at home. F/b Dr. Garcia Frazier. Unclear etiology - not provoked per Dr. David Zuniga note. Per chart review, pt started on Xarelto, however pt reportedly taking Eliquis per medication reconciliation with wife over phone.  - Continue Eliquis 5mg BID  - OOB as tolerated     Hypokalemia: POA 3.1. Wnl.  - BMP and Mg daily  - Replete as needed  - Patient on remote tele    AIDEE: Recommend BiPAP at night.      HLD: Last lipid panel 08/2020 with , HDL 43, LDL 99, . On Rosuvastatin 20mg daily at home.   - Will substitute with Atorvastatin 40mg daily      HTN: POA /68. On Metoprolol XL 50mg daily, Diltiazem 240mg daily, and HCTZ 25mg daily. - Will continue BP meds  - Continue IV Lasix 20mg daily  - Monitor and adjust as required     Chronic lymphedema: Chronic venous stasis and 3+ pitting edema noted on exam. Pt states he uses compression stockings at home. - Compression stockings ordered  - Lasix as above  - Elevate lower extremities     BPH: Chronic, stable. - Continue Alfuzosin 10mg daily - pharm to substitute     Obesity:  - The pt's Body mass index is 48.15 kg/m². - Encouraging lifestyle modifications and further follow up outpatient. Dysuria: Noted per pt. Likely 2/2 decreased UOP in setting of poor PO intake. UA now without evidence of infection.  - Continue to monitor VS, labs, and symptoms  - Bladder checks and straight cath PRN    Lactic acidosis: DOWNTRENDED. POA 3.0 -> 2.4 -> 2.2 -> 2.5 -> 3.0 -> 3.2 -> 2.8 -> 2.9 -> 2.1. Concern for PE given, O2 requirement. - Stopped trending   - BMP daily    Troponinemia (resolved): Troponin of 15 -> 22 -> 15. Pt without chest pain or tightness. Low concern for ACS. Per chart review, last [facility] 2007 was wnl.  - Will continue to monitor symptoms, electrolytes  - Patient on remote cardiac monitoring       FEN/GI - Diabetic diet. Activity - Out of bed with assistance  DVT prophylaxis - Eliquis  GI prophylaxis - Pepcid 20 BID  Fall prophylaxis - Fall precautions ordered. Disposition - Admit to Remote Telemetry. PT recommends 2 days/week home PT  Code Status - Partial (DNI). Discussed with patient / caregivers. Next of Katarina 69 Name and Krish Schroeder 23 - Jimbo Barrow MD  Family Medicine Resident    Patient discussed with Dr. Chelly Andrade, [facility] Attending         Subjective / Objective       Patient resting comfortably in no acute distress, on O2 7L. Patient is sitting up and looks improved. No complaints.      Respiratory: O2 Flow Rate (L/min): 4 l/min O2 Device: Nasal cannula   Visit Vitals  /72 (BP 1 Location: Right upper arm, BP Patient Position: Semi fowlers)   Pulse (!) 47   Temp 97.7 °F (36.5 °C)   Resp 17   Ht 6' 2.02\" (1.88 m)   Wt (!) 376 lb (170.6 kg)   SpO2 95%   BMI 48.25 kg/m²       General: No acute distress. Alert. Cooperative. Obese male positioned comfortably in bed  Respiratory: Fine bibasilar crackles, distal lung sounds. Cardiovascular: Regular rate and rhythm. Distal heart sounds. GI: + bowel sounds. Nontender. No rebound tenderness or guarding. Protuberant abdomen  Extremities:  2+ pitting LE edema bilateral lower extremities w/ chronic venous stasis changes - improved  Skin: Warm, dry. Lymphedema in bilateral LE. Neuro: Awake, alert, and appropriately conversant. I/O:  Date 12/02/21 0700 - 12/03/21 0659 12/03/21 0700 - 12/04/21 0659   Shift 0325-7995 8165-2026 24 Hour Total 6933-9059 8809-9828 24 Hour Total   INTAKE   P.O. 720  720        P. O. 720  720      I. V.(mL/kg/hr) 0(0)  0(0)        I.V. 0  0      Blood 0  0        Autotransfused 0  0      Other 0  0        Other 0  0      Shift Total(mL/kg) 720(4.2)  720(4.2)      OUTPUT   Urine(mL/kg/hr) 800(0.4) 1150(0.6) 1950(0.5)        Urine Voided 800 1150 1950        Urine Occurrence(s) 1 x  1 x      Emesis/NG output           Emesis Occurrence(s) 0 x  0 x      Stool           Stool Occurrence(s) 1 x  1 x      Shift Total(mL/kg) 800(4.6) 1150(6.7) 1950(11.4)      NET -80 -1150 -1230      Weight (kg) 172.2 170.6 170.6 170.6 170.6 170.6       CBC:  Recent Labs     12/03/21  0004 12/02/21  0030 12/01/21  0431   WBC 10.5 9.9 8.5   HGB 14.6 13.7 13.0   HCT 42.9 40.1 38.3   * 375 507       Metabolic Panel:  Recent Labs     12/03/21  0004 12/02/21  0030 12/01/21  0431   * 134* 133*   K 3.5 3.7 3.8   CL 97 97 99   CO2 28 27 27   BUN 26* 28* 28*   CREA 1.06 1.12 1.01   * 311* 404*   CA 8.6 8.5 8.4*   MG 2.2 2.1 2.4  2.5*          For Billing    Chief Complaint   Patient presents with  Positive For [facility] Problems  Date Reviewed: 11/28/2021          Codes Class Noted POA    Lymphedema ICD-10-CM: I89.0  ICD-9-CM: 457.1  11/27/2021 Yes        BPH (benign prostatic hyperplasia) ICD-10-CM: N40.0  ICD-9-CM: 600.00  11/27/2021 Yes        Elevated troponin ICD-10-CM: R77.8  ICD-9-CM: 790.6  11/27/2021 Yes        History of DVT of lower extremity ICD-10-CM: Z86.718  ICD-9-CM: V12.51  11/27/2021 Yes        * (Principal) Acute hypoxemic respiratory failure due to COVID-19 [facility]) ICD-10-CM: U07.1, J96.01  ICD-9-CM: 518.81, 079.89, 799.02  11/26/2021 Yes        Lactic acidosis ICD-10-CM: E87.2  ICD-9-CM: 276.2  8/15/2020 Yes        Hyperlipemia (Chronic) ICD-10-CM: E78.5  ICD-9-CM: 272.4  3/12/2009 Yes        HTN (hypertension) (Chronic) ICD-10-CM: I10  ICD-9-CM: 401.9  3/12/2009 Yes        Type 2 diabetes mellitus with hyperglycemia, with long-term current use of insulin (HCC) ICD-10-CM: E11.65, Z79.4  ICD-9-CM: 250.00, 790.29, V58.67  3/12/1999 Yes

## 2021-12-03 NOTE — PROGRESS NOTES
Care Management follow up, LOS 7 days     Patient admitted for increased shortness of breath, cough.  COVID+, pneumonia. History of: HTN, diabetes, hyperlipidemia, chronic lymphedema, right DVT. COVID Vaccine:  no       RUR 10 (Score %) low    Is This a Readmission NO  Is this a Bundle NO     Current status  Patient discussed during interdisciplinary rounds. Patient continues to require medical management including ongoing assessment and monitoring.  Continues on oxygen at 4L/NC%, weaning as tolerated. Started Baricitinib 11/28/21. Patient had home O2 assessment this am and does not require home oxygen.     Transition of Care Plan  · Monitor patient status and response to treatment. · Patient continues to require medical management. · [facility], [facility] to [facility]. · No home oxygen requirement. · CM to monitor progress and recommendations.     Max Peterson RN, MSN/Care manager  [phone]

## 2021-12-03 NOTE — PROGRESS NOTES
Bedside and Verbal shift change report given to MADELEINE Duenas (oncoming nurse) by [address], RN (offgoing nurse). Report included the following information SBAR, Kardex, ED Summary, MAR, Recent Results and Cardiac Rhythm SB. This patient was assisted with Intentional Toileting every 2 hours during this shift as appropriate. Documentation of ambulation and output reflected on Flowsheet as appropriate. Purposeful hourly rounding was completed using AIDET and 5Ps. Outcomes of PHR documented as they occurred. Bed alarm in use as appropriate. Dual Suction and ambubag in place. TRANSFER - OUT REPORT:    Verbal report given to MADELEINE Arita(name) on Ivy Hui  being transferred to 5th floor (unit) for routine progression of care       Report consisted of patients Situation, Background, Assessment and   Recommendations(SBAR). Information from the following report(s) SBAR, Kardex, ED Summary, MAR, Recent Results and Cardiac Rhythm SR/SB was reviewed with the receiving nurse. Lines:   Peripheral IV 11/27/21 Left Antecubital (Active)   Site Assessment Clean, dry, & intact 12/03/21 0819   Phlebitis Assessment 0 12/03/21 0819   Infiltration Assessment 0 12/03/21 0819   Dressing Status Clean, dry, & intact 12/03/21 0819   Dressing Type Transparent; Tape 12/03/21 0819   Hub Color/Line Status Pink; Flushed; Capped 12/03/21 0819   Action Taken Open ports on tubing capped 12/03/21 0819   Alcohol Cap Used Yes 12/03/21 0819        Opportunity for questions and clarification was provided.       Patient transported with:   O2 @ 4 liters

## 2021-12-03 NOTE — PROGRESS NOTES
PULMONARY ASSOCIATES [facility]     Name: Annette Perez MRN: [id number]   : 1960 Hospital: [address]   Date: 12/3/2021        Impression Plan   1. Acute hypoxemic respiratory failure  2. COVID 19 PNA  3. Morbid obesity  4. Hx of DVT               · Wean O2 to keep sats above 90%  · Continue Baricitinib  · Dexamethasone 6mg daily  · OOB into chair  · Self prone at night for 3 hrs if possible--he declines  · Follow d-dimer  · Follow inflammatory marker  · Continue eliquis  · Encourage IS use  · Exercise oximetry prior to discharge    Will see prn over the weekend          Radiology  (personally reviewed)  chest CTA: There is multifocal groundglass opacification and areas of consolidation throughout both lower lobes with additional involvement of the left upper lobe, right upper lobe, and right middle lobe. No PE. Subjective     Cc: shortness of breath    65 yo with morbid obesity, DVT, presenting with covid 19 PNA. Pt first diagnosed . Started to get more SOB in the last couple of days. O2 requirement has increased drastically in the last 24 hrs. Now up to 15 liters HF. Lifetime non-smoker. No hx of lung problems. Unvaccinated. A comprehensive review of systems was negative except for that written in the HPI. Interval History: On 4L O2      ROS: up in the chair. Slept with BiPAP last night for 5 hours; says he didn't care for it     Past Medical History:   Diagnosis Date    AR (allergic rhinitis) 2009    Diabetes ([facility] [address]     Dr. Cristina Torres    History of vascular access device 2020    4f single picc placed on r cephalic, 94JD at 3cm out placed by CARMEN Lowry, difficult limited access    Hypercholesterolemia     Hypertension       Past Surgical History:   Procedure Laterality Date    HX HEART CATHETERIZATION      normal      Prior to Admission medications    Medication Sig Start Date End Date Taking?  Authorizing Provider   dexAMETHasone (DECADRON) 6 mg tablet Take 1 Tablet by mouth Daily (before breakfast). Start morning of 11/28 11/27/21  Yes Brenda Mayo MD   zinc sulfate (ZINCATE) 50 mg zinc (220 mg) capsule Take 1 Capsule by mouth daily. 11/27/21  Yes Brenda Mayo MD   ascorbic acid, vitamin C, (VITAMIN C) 500 mg tablet Take 1 Tablet by mouth two (2) times a day. 11/27/21  Yes Brenda Mayo MD   insulin glargine U-300 conc (Toujeo Max U-300 SoloStar) 300 unit/mL (3 mL) inpn 100 Units by SubCUTAneous route nightly. Yes Provider, Historical   apixaban (ELIQUIS) 5 mg tablet Take 5 mg by mouth two (2) times a day. Indications: blood clot in a deep vein of the extremities   Yes Provider, Historical   metoprolol succinate (TOPROL-XL) 50 mg XL tablet Take 50 mg by mouth daily. Yes Provider, Historical   insulin lispro (HUMALOG) 100 unit/mL kwikpen by SubCUTAneous route Before breakfast, lunch, and dinner. Sliding scale   Yes Provider, Historical   dilTIAZem ER (CARDIZEM LA) 240 mg Tb24 tablet Take 240 mg by mouth daily. Yes Provider, Historical   hydroCHLOROthiazide (HYDRODIURIL) 25 mg tablet Take 25 mg by mouth daily. Yes Provider, Historical   rosuvastatin (CRESTOR) 20 mg tablet Take 20 mg by mouth nightly. Yes Provider, Historical   ondansetron (ZOFRAN ODT) 4 mg disintegrating tablet Take 1 Tablet by mouth every eight (8) hours as needed for Nausea or Vomiting. Patient not taking: Reported on 11/26/2021 11/23/21   Taisha PINEDA, DO   triamcinolone acetonide (KENALOG) 0.1 % topical cream Apply  to affected area three (3) times daily as needed for Skin Irritation. use thin layer 8/6/21   Shamir Lockett, DO   atorvastatin (LIPITOR) 40 mg tablet  2/15/21   Provider, Historical   aspirin delayed-release 81 mg tablet Take 81 mg by mouth daily. Patient not taking: Reported on 8/6/2021    Provider, Historical   metFORMIN (GLUCOPHAGE) 1,000 mg tablet Take 1,000 mg by mouth two (2) times daily (with meals).     Provider, Historical     Current Facility-Administered Medications   Medication Dose Route Frequency    insulin lispro (HUMALOG) injection 50 Units  50 Units SubCUTAneous TID WITH MEALS    insulin NPH (NOVOLIN N, HUMULIN N) injection 80 Units  80 Units SubCUTAneous DAILY    And    dexamethasone (DECADRON) 4 mg/mL injection 6 mg  6 mg IntraVENous Q24H    polyethylene glycol (MIRALAX) packet 17 g  17 g Oral DAILY    insulin glargine (LANTUS) injection 50 Units  50 Units SubCUTAneous DAILY    And    insulin glargine (LANTUS) injection 50 Units  50 Units SubCUTAneous DAILY    furosemide (LASIX) injection 20 mg  20 mg IntraVENous DAILY    baricitinib (OLUMIANT) tablet 4 mg  4 mg Oral DAILY    famotidine (PEPCID) tablet 20 mg  20 mg Oral BID    insulin lispro (HUMALOG) injection   SubCUTAneous QID WITH MEALS    sodium chloride (NS) flush 5-40 mL  5-40 mL IntraVENous Q8H    ascorbic acid (vitamin C) (VITAMIN C) tablet 500 mg  500 mg Oral BID    zinc sulfate (ZINCATE) 50 mg zinc (220 mg) capsule 1 Capsule  1 Capsule Oral DAILY    atorvastatin (LIPITOR) tablet 40 mg  40 mg Oral DAILY    dilTIAZem ER (CARDIZEM CD) capsule 240 mg  240 mg Oral DAILY    hydroCHLOROthiazide (HYDRODIURIL) tablet 25 mg  25 mg Oral DAILY    [Held by provider] metoprolol succinate (TOPROL-XL) XL tablet 50 mg  50 mg Oral DAILY    apixaban (ELIQUIS) tablet 5 mg  5 mg Oral BID    tamsulosin (FLOMAX) capsule 0.4 mg  0.4 mg Oral DAILY     No Known Allergies   Social History     Tobacco Use    Smoking status: Former Smoker     Packs/day: 1.00     Years: 15.00     Pack years: 15.00     Types: Cigarettes, Pipe, Cigars     Quit date: 3/12/1991     Years since quittin.7    Smokeless tobacco: Never Used   Substance Use Topics    Alcohol use: No      Family History   Problem Relation Age of Onset    Heart Failure Father     Diabetes Brother     Heart Failure Paternal Grandfather          age 37 AMI          Laboratory: I have personally reviewed the flowsheet and labs. Recent Labs     12/03/21  0004 12/02/21  0030 12/01/21  0431   WBC 10.5 9.9 8.5   HGB 14.6 13.7 13.0   HCT 42.9 40.1 38.3   * 375 341     Recent Labs     12/03/21  0004 12/02/21  0030 12/01/21  0431   * 134* 133*   K 3.5 3.7 3.8   CL 97 97 99   CO2 28 27 27   * 311* 404*   BUN 26* 28* 28*   CREA 1.06 1.12 1.01   CA 8.6 8.5 8.4*   MG 2.2 2.1 2.4  2.5*       Objective:     Visit Vitals  /87 (BP 1 Location: Right lower arm, BP Patient Position: Sitting)   Pulse 65   Temp 97.8 °F (36.6 °C)   Resp 16   Ht 6' 2.02\" (1.88 m)   Wt (!) 170.6 kg (376 lb)   SpO2 92%   BMI 48.25 kg/m²         Intake/Output Summary (Last 24 hours) at 12/3/2021 0853  Last data filed at 12/3/2021 1010  Gross per 24 hour   Intake 480 ml   Output 2000 ml   Net -1520 ml     EXAM:   GENERAL: obese, no distress, flat affect HEENT:  anicteric, EOMI, no alar flaring or epistaxis, oral mucosa moist without cyanosis, NECK: no retractions, no thyromegaly or masses, LUNGS: normal resp effort HEART:  Regular rate and rhythm with no MGR; no edema is present, ABDOMEN:  soft with no tenderness, bowel sounds present, EXTREMITIES:  warm with no cyanosis, SKIN:  no jaundice or ecchymosis and NEUROLOGIC:  alert and oriented, grossly non-focal    Mendel Kayser E. Deward Scripture, [address]

## 2021-12-03 NOTE — PROGRESS NOTES
Problem: Mobility Impaired (Adult and Pediatric)  Goal: *Acute Goals and Plan of Care (Insert Text)  Description: FUNCTIONAL STATUS PRIOR TO ADMISSION: Patient was independent and active without use of DME.    HOME SUPPORT PRIOR TO ADMISSION: The patient lived with wife but did not require assist.    Physical Therapy Goals  Initiated 11/27/2021; reviewed 11/30/21 and remain appropriate, reviewed again 12/3/21: patient progressing, still appropriate:  1. Patient will move from supine to sit and sit to supine  in bed with independence within 7 day(s). 2.  Patient will transfer from bed to chair and chair to bed with independence using the least restrictive device within 7 day(s). 3.  Patient will perform sit to stand with independence within 7 day(s). 4.  Patient will ambulate with independence for 150 feet with the least restrictive device within 7 day(s). Outcome: Progressing Towards Goal   PHYSICAL THERAPY TREATMENT: WEEKLY REASSESSMENT  Patient: Lauri Rehman (9 y.o. male)  Date: 12/3/2021  Primary Diagnosis: Acute hypoxemic respiratory failure due to COVID-19 ([facility] [address] [U07.1, J96.01]        Precautions:   Contact (COVID; Dropet plus)      ASSESSMENT  Patient continues with skilled PT services and is progressing towards goals. Patient initially declining to work with therapy 2/2 constipation and fatigue. Noted patient with decreased O2 demands, on 4L upon arrival.  PT obtained longer O2 tubing for patient to allow longer distance gait within room and to/from bathroom vs BSC. Patient agreeable for one trip around room/bathroom, completing with only supervision. O2 sats remained > 90% on 4L but patient did report increased general fatigue with activity (not SOB) today vs yesterday. Left up on side of bed in NAD, encouraged seated LE therex later today. Patient continues to demo decreased activity and pulmonary tolerance well below his baseline level of function.  Will cont to benefit from PT in the acute setting to work towards above goals and recommend HHPT at d/c vs none, pending progress. Patient's progression toward goals since last assessment: at SUP level for all mobility now    Current Level of Function Impacting Discharge (mobility/balance): SUP level    Functional Outcome Measure: The patient scored 55 on the Barthel outcome measure which is indicative of partial dependence. Other factors to consider for discharge:          PLAN :  Goals have been updated based on progression since last assessment. Patient continues to benefit from skilled intervention to address the above impairments. Recommendations and Planned Interventions: transfer training, gait training, therapeutic exercises, neuromuscular re-education, patient and family training/education, and therapeutic activities      Frequency/Duration: Patient will be followed by physical therapy:  5 times a week to address goals. Recommendation for discharge: (in order for the patient to meet his/her long term goals)  Physical therapy at least 2 days/week in the home vs none    This discharge recommendation:  Has been made in collaboration with the attending provider and/or case management    IF patient discharges home will need the following DME: patient owns DME required for discharge         SUBJECTIVE:   Patient stated I'm just feeling tired today, and I really want to be able to go to the bathroom.     OBJECTIVE DATA SUMMARY:   HISTORY:    Past Medical History:   Diagnosis Date    AR (allergic rhinitis) 6/11/2009    Diabetes ([facility] [address]     Dr. Roly Bianchi    History of vascular access device 08/18/2020    4f single picc placed on r cephalic, 14RH at 3cm out placed by CARMEN Lowry, difficult limited access    Hypercholesterolemia     Hypertension      Past Surgical History:   Procedure Laterality Date    HX HEART CATHETERIZATION  2007    normal       Personal factors and/or comorbidities impacting plan of care:     Home Situation  Home Environment: Private residence  # Steps to Enter: 3  Rails to Enter: Yes  Hand Rails : Left  One/Two Story Residence: One story  Living Alone: No  Support Systems: Spouse/Significant Other  Patient Expects to be Discharged to[de-identified] House  Current DME Used/Available at Home: Compression garments, Raised toilet seat  Tub or Shower Type: Shower    EXAMINATION/PRESENTATION/DECISION MAKING:   Critical Behavior:  Neurologic State: Alert  Orientation Level: Oriented X4  Cognition: Appropriate decision making, Follows commands  Safety/Judgement: Awareness of environment  Hearing: Auditory  Auditory Impairment: None    Range Of Motion:  AROM: Generally decreased, functional                       Strength:    Strength: Generally decreased, functional                    Tone & Sensation:   Tone: Normal                              Coordination:  Coordination: Within functional limits  Vision:      Functional Mobility:  Bed Mobility:     Supine to Sit: Modified independent  Sit to Supine: Modified independent  Scooting: Modified independent  Transfers:  Sit to Stand: Supervision  Stand to Sit: Supervision        Bed to Chair: Supervision              Balance:   Sitting: Intact  Standing: Impaired  Standing - Static: Good  Standing - Dynamic : Fair  Ambulation/Gait Training:  Distance (ft): 25 Feet (ft)  Assistive Device: Gait belt  Ambulation - Level of Assistance: Supervision        Gait Abnormalities: Decreased step clearance        Base of Support: Widened     Speed/Jadyn: Pace decreased (<100 feet/min)  Step Length: Right shortened; Left shortened                  Functional Measure:  Barthel Index:    Bathin  Bladder: 10  Bowels: 10  Groomin  Dressin  Feeding: 10  Mobility: 0  Stairs: 0  Toilet Use: 5  Transfer (Bed to Chair and Back): 10  Total: 55/100       The Barthel ADL Index: Guidelines  1. The index should be used as a record of what a patient does, not as a record of what a patient could do.   2. The main aim is to establish degree of independence from any help, physical or verbal, however minor and for whatever reason. 3. The need for supervision renders the patient not independent. 4. A patient's performance should be established using the best available evidence. Asking the patient, friends/relatives and nurses are the usual sources, but direct observation and common sense are also important. However direct testing is not needed. 5. Usually the patient's performance over the preceding 24-48 hours is important, but occasionally longer periods will be relevant. 6. Middle categories imply that the patient supplies over 50 per cent of the effort. 7. Use of aids to be independent is allowed. Score Interpretation (from [address] [address])    Independent   60-79 Minimally independent   40-59 Partially dependent   20-39 Very dependent   <20 Totally dependent     -Jeanne Lucero., Barthel, DMichelleW. (1965). Functional evaluation: the Barthel Index. [address] [facility] ([address] (1997). The Barthel activities of daily living index: self-reporting versus actual performance in the old (> or = 75 years). Journal [address] 45(7), 14 [facility], J.JCARAF, Gilberto Gr., Neptali Cid (1999). Measuring the change in disability after inpatient rehabilitation; comparison of the responsiveness of the Barthel Index and Functional [place] Measure. Journal of Neurology, Neurosurgery, and Psychiatry, 66(4), 509-215. Caity Griffiths, NCHRISTY.A, NANCIE Grijalva, & Fidel Bains, M.A. (2004) Assessment of post-stroke quality of life in cost-effectiveness studies: The usefulness of the Barthel Index and the EuroQoL-5D.  Quality of Life Research, 13, 427-43           Pain Rating:  None reported    Activity Tolerance:   Fair, desaturates with exertion and requires oxygen, and requires rest breaks    After treatment patient left in no apparent distress:   Call bell within reach, Bed / chair alarm activated, and sitting EOB    COMMUNICATION/EDUCATION:   The patients plan of care was discussed with: Registered nurse. Fall prevention education was provided and the patient/caregiver indicated understanding. and Patient/family agree to work toward stated goals and plan of care.     Thank you for this referral.  Julienne Villar, PT   Time Calculation: 14 mins

## 2021-12-03 NOTE — PROGRESS NOTES
Called patient's wife Billie Thrasher to share updates. All questions answered, wife was very appreciative.      Sukhdev Goodwin MD

## 2021-12-04 LAB
ANION GAP SERPL CALC-SCNC: 9 MMOL/L (ref 5–15)
BASOPHILS # BLD: 0 K/UL (ref 0–0.1)
BASOPHILS NFR BLD: 0 % (ref 0–1)
BUN SERPL-MCNC: 25 MG/DL (ref 6–20)
BUN/CREAT SERPL: 24 (ref 12–20)
CALCIUM SERPL-MCNC: 8.5 MG/DL (ref 8.5–10.1)
CHLORIDE SERPL-SCNC: 98 MMOL/L (ref 97–108)
CO2 SERPL-SCNC: 28 MMOL/L (ref 21–32)
CREAT SERPL-MCNC: 1.05 MG/DL (ref 0.7–1.3)
CRP SERPL-MCNC: <0.29 MG/DL (ref 0–0.6)
D DIMER PPP FEU-MCNC: 0.38 MG/L FEU (ref 0–0.65)
DIFFERENTIAL METHOD BLD: ABNORMAL
EOSINOPHIL # BLD: 0 K/UL (ref 0–0.4)
EOSINOPHIL NFR BLD: 0 % (ref 0–7)
ERYTHROCYTE [DISTWIDTH] IN BLOOD BY AUTOMATED COUNT: 14.3 % (ref 11.5–14.5)
FERRITIN SERPL-MCNC: 408 NG/ML (ref 26–388)
GLUCOSE BLD STRIP.AUTO-MCNC: 267 MG/DL (ref 65–117)
GLUCOSE BLD STRIP.AUTO-MCNC: 283 MG/DL (ref 65–117)
GLUCOSE BLD STRIP.AUTO-MCNC: 315 MG/DL (ref 65–117)
GLUCOSE BLD STRIP.AUTO-MCNC: 339 MG/DL (ref 65–117)
GLUCOSE SERPL-MCNC: 283 MG/DL (ref 65–100)
HCT VFR BLD AUTO: 39.7 % (ref 36.6–50.3)
HGB BLD-MCNC: 13.5 G/DL (ref 12.1–17)
IMM GRANULOCYTES # BLD AUTO: 0 K/UL
IMM GRANULOCYTES NFR BLD AUTO: 0 %
LDH SERPL L TO P-CCNC: 293 U/L (ref 85–241)
LYMPHOCYTES # BLD: 1.1 K/UL (ref 0.8–3.5)
LYMPHOCYTES NFR BLD: 9 % (ref 12–49)
MAGNESIUM SERPL-MCNC: 2.2 MG/DL (ref 1.6–2.4)
MCH RBC QN AUTO: 28.7 PG (ref 26–34)
MCHC RBC AUTO-ENTMCNC: 34 G/DL (ref 30–36.5)
MCV RBC AUTO: 84.3 FL (ref 80–99)
MONOCYTES # BLD: 1.4 K/UL (ref 0–1)
MONOCYTES NFR BLD: 12 % (ref 5–13)
NEUTS BAND NFR BLD MANUAL: 1 % (ref 0–6)
NEUTS SEG # BLD: 9.2 K/UL (ref 1.8–8)
NEUTS SEG NFR BLD: 78 % (ref 32–75)
NRBC # BLD: 0 K/UL (ref 0–0.01)
NRBC BLD-RTO: 0 PER 100 WBC
PLATELET # BLD AUTO: 357 K/UL (ref 150–400)
PMV BLD AUTO: 10.1 FL (ref 8.9–12.9)
POTASSIUM SERPL-SCNC: 3.6 MMOL/L (ref 3.5–5.1)
RBC # BLD AUTO: 4.71 M/UL (ref 4.1–5.7)
RBC MORPH BLD: ABNORMAL
SERVICE CMNT-IMP: ABNORMAL
SODIUM SERPL-SCNC: 135 MMOL/L (ref 136–145)
WBC # BLD AUTO: 11.7 K/UL (ref 4.1–11.1)

## 2021-12-04 PROCEDURE — 74011250637 HC RX REV CODE- 250/637: Performed by: STUDENT IN AN ORGANIZED HEALTH CARE EDUCATION/TRAINING PROGRAM

## 2021-12-04 PROCEDURE — 74011636637 HC RX REV CODE- 636/637

## 2021-12-04 PROCEDURE — 83735 ASSAY OF MAGNESIUM: CPT

## 2021-12-04 PROCEDURE — 80048 BASIC METABOLIC PNL TOTAL CA: CPT

## 2021-12-04 PROCEDURE — 74011250636 HC RX REV CODE- 250/636: Performed by: STUDENT IN AN ORGANIZED HEALTH CARE EDUCATION/TRAINING PROGRAM

## 2021-12-04 PROCEDURE — 36415 COLL VENOUS BLD VENIPUNCTURE: CPT

## 2021-12-04 PROCEDURE — 83615 LACTATE (LD) (LDH) ENZYME: CPT

## 2021-12-04 PROCEDURE — 82728 ASSAY OF FERRITIN: CPT

## 2021-12-04 PROCEDURE — 74011636637 HC RX REV CODE- 636/637: Performed by: STUDENT IN AN ORGANIZED HEALTH CARE EDUCATION/TRAINING PROGRAM

## 2021-12-04 PROCEDURE — 82962 GLUCOSE BLOOD TEST: CPT

## 2021-12-04 PROCEDURE — 74011250637 HC RX REV CODE- 250/637: Performed by: INTERNAL MEDICINE

## 2021-12-04 PROCEDURE — 77010033678 HC OXYGEN DAILY

## 2021-12-04 PROCEDURE — 85379 FIBRIN DEGRADATION QUANT: CPT

## 2021-12-04 PROCEDURE — 65660000000 HC RM CCU STEPDOWN

## 2021-12-04 PROCEDURE — 99232 SBSQ HOSP IP/OBS MODERATE 35: CPT | Performed by: FAMILY MEDICINE

## 2021-12-04 PROCEDURE — 94660 CPAP INITIATION&MGMT: CPT

## 2021-12-04 PROCEDURE — 85025 COMPLETE CBC W/AUTO DIFF WBC: CPT

## 2021-12-04 PROCEDURE — 86140 C-REACTIVE PROTEIN: CPT

## 2021-12-04 PROCEDURE — 94760 N-INVAS EAR/PLS OXIMETRY 1: CPT

## 2021-12-04 RX ORDER — INSULIN LISPRO 100 [IU]/ML
60 INJECTION, SOLUTION INTRAVENOUS; SUBCUTANEOUS
Status: DISCONTINUED | OUTPATIENT
Start: 2021-12-04 | End: 2021-12-05

## 2021-12-04 RX ORDER — DEXAMETHASONE SODIUM PHOSPHATE 4 MG/ML
6 INJECTION, SOLUTION INTRA-ARTICULAR; INTRALESIONAL; INTRAMUSCULAR; INTRAVENOUS; SOFT TISSUE EVERY 24 HOURS
Status: DISCONTINUED | OUTPATIENT
Start: 2021-12-04 | End: 2021-12-05 | Stop reason: HOSPADM

## 2021-12-04 RX ADMIN — POLYETHYLENE GLYCOL 3350 17 G: 17 POWDER, FOR SOLUTION ORAL at 15:30

## 2021-12-04 RX ADMIN — INSULIN GLARGINE 50 UNITS: 100 INJECTION, SOLUTION SUBCUTANEOUS at 09:52

## 2021-12-04 RX ADMIN — INSULIN LISPRO 60 UNITS: 100 INJECTION, SOLUTION INTRAVENOUS; SUBCUTANEOUS at 12:24

## 2021-12-04 RX ADMIN — BARICITINIB 4 MG: 2 TABLET, FILM COATED ORAL at 10:01

## 2021-12-04 RX ADMIN — APIXABAN 5 MG: 5 TABLET, FILM COATED ORAL at 10:01

## 2021-12-04 RX ADMIN — Medication 10 ML: at 00:11

## 2021-12-04 RX ADMIN — INSULIN LISPRO 10 UNITS: 100 INJECTION, SOLUTION INTRAVENOUS; SUBCUTANEOUS at 16:43

## 2021-12-04 RX ADMIN — Medication 10 ML: at 21:59

## 2021-12-04 RX ADMIN — INSULIN LISPRO 10 UNITS: 100 INJECTION, SOLUTION INTRAVENOUS; SUBCUTANEOUS at 12:25

## 2021-12-04 RX ADMIN — Medication 10 ML: at 15:31

## 2021-12-04 RX ADMIN — Medication 1 CAPSULE: at 10:01

## 2021-12-04 RX ADMIN — APIXABAN 5 MG: 5 TABLET, FILM COATED ORAL at 17:29

## 2021-12-04 RX ADMIN — OXYCODONE HYDROCHLORIDE AND ACETAMINOPHEN 500 MG: 500 TABLET ORAL at 10:00

## 2021-12-04 RX ADMIN — OXYCODONE HYDROCHLORIDE AND ACETAMINOPHEN 500 MG: 500 TABLET ORAL at 17:28

## 2021-12-04 RX ADMIN — ATORVASTATIN CALCIUM 40 MG: 20 TABLET, FILM COATED ORAL at 10:00

## 2021-12-04 RX ADMIN — DEXAMETHASONE SODIUM PHOSPHATE 6 MG: 4 INJECTION, SOLUTION INTRAMUSCULAR; INTRAVENOUS at 09:57

## 2021-12-04 RX ADMIN — TAMSULOSIN HYDROCHLORIDE 0.4 MG: 0.4 CAPSULE ORAL at 10:00

## 2021-12-04 RX ADMIN — INSULIN LISPRO 60 UNITS: 100 INJECTION, SOLUTION INTRAVENOUS; SUBCUTANEOUS at 16:43

## 2021-12-04 RX ADMIN — INSULIN LISPRO 60 UNITS: 100 INJECTION, SOLUTION INTRAVENOUS; SUBCUTANEOUS at 07:50

## 2021-12-04 RX ADMIN — FAMOTIDINE 20 MG: 20 TABLET, FILM COATED ORAL at 10:02

## 2021-12-04 RX ADMIN — Medication 10 ML: at 07:14

## 2021-12-04 RX ADMIN — HYDROCHLOROTHIAZIDE 25 MG: 25 TABLET ORAL at 10:00

## 2021-12-04 RX ADMIN — INSULIN LISPRO 4 UNITS: 100 INJECTION, SOLUTION INTRAVENOUS; SUBCUTANEOUS at 21:58

## 2021-12-04 RX ADMIN — INSULIN HUMAN 90 UNITS: 100 INJECTION, SUSPENSION SUBCUTANEOUS at 09:53

## 2021-12-04 RX ADMIN — INSULIN GLARGINE 50 UNITS: 100 INJECTION, SOLUTION SUBCUTANEOUS at 10:02

## 2021-12-04 RX ADMIN — FUROSEMIDE 20 MG: 10 INJECTION, SOLUTION INTRAMUSCULAR; INTRAVENOUS at 09:56

## 2021-12-04 RX ADMIN — FAMOTIDINE 20 MG: 20 TABLET, FILM COATED ORAL at 17:29

## 2021-12-04 RX ADMIN — INSULIN LISPRO 7 UNITS: 100 INJECTION, SOLUTION INTRAVENOUS; SUBCUTANEOUS at 07:48

## 2021-12-04 RX ADMIN — DILTIAZEM HYDROCHLORIDE 240 MG: 240 CAPSULE, COATED, EXTENDED RELEASE ORAL at 10:00

## 2021-12-04 NOTE — PROGRESS NOTES
Call placed to patient's wife who is also home recovering from covid pneumonia. She had just talked with the physician and knows plans, said she knows they are still testing to see if he will need oxygen at home and once it is determined referral will be placed if needed. She also identified [address] [facility] as preference and referral sent in [facility]. CM to continue to follow    Transition of Care  RUR 10%  1- CM following for discharge planning and referral to resp care if needed. 2- family plans to provide transportation home  [facility]  referral pending acceptance and family identified no second preference other than what insurance will cover.

## 2021-12-04 NOTE — PROGRESS NOTES
Bedside and Verbal shift change report given to Mildred Hawkins (oncoming nurse) by Tessy Allan (offgoing nurse). Report included the following information SBAR, Intake/Output, MAR, Recent Results and Cardiac Rhythm Sinus Rhythm .

## 2021-12-04 NOTE — PROGRESS NOTES
[address] [facility] [address] [place] [address]   Office [phone]  Fax [phone]          Assessment and Plan     Jewel Napoles is a 64 y.o. male with a PMH of HTN, DM2, HLD, chronic lymphedema, history of right femoral DVT in 2020 now on [facility] admitted for acute hypoxic respiratory failure 2/2 COVID PNA and lactic acidosis. Patient was admitted on 11/26/2021.    24-Hour Events: No acute events overnight. BiPap worn. Acute hypoxic respiratory failure 2/2 COVID pneumonia: Pt with O2 sat of 88% at time of admission. Symptom onset 11/18. Rapidly increasing O2 needs on 11/28, CTA neg for PE. Started on Baricitinib on 11/28.  - PT/OT consulted   - Decadron to 6mg daily, due to uncontrolled BG. Last dose of 10 day course will be 12/5/21  - BiPap overnight   - Continue Baricitinib 4 mg daily (started 11/28)  - Lasix IV 20mg daily  - Pulmonology following, appreciate further recommendations  - COVID meds (Atorvastatin 40mg every day, Vitamin C 500mg BID, and Zinc 220mg every day)  - CBC with diff daily, COVID labs (CRP, D-dimer, ferritin, LD) daily, currently downtrending     T2DM: Last A1c 7.9% in 09/2020. On Lantus 100u QHS, SSI Lispro, Metformin 1000mg BID at home. - Endocrinology consulted, appreciate recs. - Lantus 100u daily   - Lispro 60 units TID w/ meals  - NPH 90u linked with Dexamethasone  - SSI / ACHS, Hypoglycemia protocols ordered     History of DVT: On Eliquis at home. F/b Dr. Kaur Baker. Unclear etiology - not provoked per Dr. Ernie Gonzales note. Per chart review, pt started on Xarelto, however pt reportedly taking Eliquis per medication reconciliation with wife over phone.  - Continue Eliquis 5mg BID  - OOB as tolerated     Hypokalemia: POA 3.1. Wnl.  - BMP and Mg daily  - Replete as needed  - Patient on remote tele    AIDEE: Recommend BiPAP at night.      HLD: Last lipid panel 08/2020 with , HDL 43, LDL 99, . On Rosuvastatin 20mg daily at home.   - Will substitute with Atorvastatin 40mg daily    HTN: POA /68. On Metoprolol XL 50mg daily, Diltiazem 240mg daily, and HCTZ 25mg daily. - Will continue BP meds  - Continue IV Lasix 20mg daily  - Monitor and adjust as required     Chronic lymphedema: Chronic venous stasis and 1+ pitting edema noted on exam. Pt states he uses compression stockings at home. - Compression stockings ordered  - Lasix as above  - Elevate lower extremities     BPH: Chronic, stable. - Continue Alfuzosin 10mg daily - pharm to substitute     Obesity:  - The pt's Body mass index is 48.15 kg/m². - Encouraging lifestyle modifications and further follow up outpatient. Dysuria: Noted per pt. Likely 2/2 decreased UOP in setting of poor PO intake. UA now without evidence of infection.  - Continue to monitor VS, labs, and symptoms  - Bladder checks and straight cath PRN    Lactic acidosis: DOWNTRENDED. POA 3.0 -> 2.4 -> 2.2 -> 2.5 -> 3.0 -> 3.2 -> 2.8 -> 2.9 -> 2.1. Concern for PE given, O2 requirement. - Stopped trending   - BMP daily    Troponinemia (resolved): Troponin of 15 -> 22 -> 15. Pt without chest pain or tightness. Low concern for ACS. Per chart review, last [facility] 2007 was wnl.  - Will continue to monitor symptoms, electrolytes  - Patient on remote cardiac monitoring       FEN/GI - Diabetic diet. Activity - Out of bed with assistance  DVT prophylaxis - Eliquis  GI prophylaxis - Pepcid 20 BID  Fall prophylaxis - Fall precautions ordered. Disposition - Admit to Remote Telemetry. PT recommends 2 days/week home PT  Code Status - Partial (DNI). Discussed with patient / caregivers. Next of Katarina 69 Name and Krish Schroeder 23 - Raquel Dunn MD  Family Medicine Resident    Patient discussed with Dr. Safia Lindquist, [facility] Attending         Subjective / Objective       Patient resting comfortably in no acute distress. Respiratory: O2 Flow Rate (L/min): 4 l/min O2 Device: Nasal cannula   Visit Vitals  BP (!) 146/76 (BP 1 Location: Right arm, BP Patient Position:  At rest)   Pulse 71   Temp 97.9 °F (36.6 °C)   Resp 18   Ht 6' 2.02\" (1.88 m)   Wt (!) 376 lb (170.6 kg)   SpO2 94%   BMI 48.25 kg/m²       General: No acute distress. Alert. Cooperative. Obese male positioned comfortably in bed  Respiratory: Fine bibasilar crackles, distal lung sounds. Cardiovascular: Regular rate and rhythm. Distal heart sounds. GI: + bowel sounds. Nontender. No rebound tenderness or guarding. Protuberant abdomen  Extremities:  1+ pitting LE edema bilateral lower extremities w/ chronic venous stasis changes - improved  Skin: Warm, dry. Lymphedema in bilateral LE. Neuro: Awake, alert, and appropriately conversant. I/O:  Date 12/02/21 1900 - 12/03/21 0659 12/03/21 0700 - 12/04/21 0659   Shift 5345-6097 24 Hour Total 0939-0459 8936-4756 24 Hour Total   INTAKE   P.O.  720 540  540     P. O.  720 540  540   I. V.(mL/kg/hr)  0 0(0)  0     I.V.  0 0  0   Blood  0 0  0     Autotransfused  0 0  0   Other  0 0  0     Other  0 0  0   Shift Total(mL/kg)  720(4.2) 540(3.2)  540(3.2)   OUTPUT   Urine(mL/kg/hr) 1150 1950 650(0.3)  650     Urine Voided 1150 1950 650  650     Urine Occurrence(s)  1 x 1 x 1 x 2 x   Emesis/NG output          Emesis Occurrence(s)  0 x      Stool          Stool Occurrence(s)  1 x      Shift Total(mL/kg) 1150(6.7) 1950(11.4) 650(3.8)  650(3.8)   NET -1150 -1230 -110  -110   Weight (kg) 170.6 170.6 170.6 170.6 170.6       CBC:  Recent Labs     12/03/21  0004 12/02/21  0030 12/01/21 0431   WBC 10.5 9.9 8.5   HGB 14.6 13.7 13.0   HCT 42.9 40.1 38.3   * 375 219       Metabolic Panel:  Recent Labs     12/03/21  0004 12/02/21  0030 12/01/21  0431   * 134* 133*   K 3.5 3.7 3.8   CL 97 97 99   CO2 28 27 27   BUN 26* 28* 28*   CREA 1.06 1.12 1.01   * 311* 404*   CA 8.6 8.5 8.4*   MG 2.2 2.1 2.4  2.5*          For Billing    Chief Complaint   Patient presents with   [facility] Problems  Date Reviewed: 12/3/2021          Codes Class Noted POA Positive D dimer ICD-10-CM: R79.89  ICD-9-CM: 790.92  12/3/2021 Unknown        Constipation ICD-10-CM: K59.00  ICD-9-CM: 564.00  12/3/2021 Unknown        Chronic acquired lymphedema ICD-10-CM: I89.0  ICD-9-CM: 457.1  11/27/2021 Unknown        BPH (benign prostatic hyperplasia) ICD-10-CM: N40.0  ICD-9-CM: 600.00  11/27/2021 Yes        Elevated troponin ICD-10-CM: R77.8  ICD-9-CM: 790.6  11/27/2021 Yes        History of DVT (deep vein thrombosis) ICD-10-CM: M12.207  ICD-9-CM: V12.51  11/27/2021 Unknown        * (Principal) Acute hypoxemic respiratory failure due to COVID-19 [facility]) ICD-10-CM: U07.1, J96.01  ICD-9-CM: 518.81, 079.89, 799.02  11/26/2021 Yes        Lactic acidosis ICD-10-CM: E87.2  ICD-9-CM: 276.2  8/15/2020 Yes        Hyperlipemia (Chronic) ICD-10-CM: E78.5  ICD-9-CM: 272.4  3/12/2009 Yes        HTN (hypertension) (Chronic) ICD-10-CM: I10  ICD-9-CM: 401.9  3/12/2009 Yes        Type 2 diabetes mellitus with hyperglycemia, with long-term current use of insulin (HCC) ICD-10-CM: E11.65, Z79.4  ICD-9-CM: 250.00, 790.29, V58.67  3/12/1999 Yes

## 2021-12-04 NOTE — PROGRESS NOTES
Called patient's wife Leigha Moreau to give her updates on his care. All questions answered.     Rey Ferrer, DO

## 2021-12-04 NOTE — PROGRESS NOTES
Bedside shift change report given to Matt Celestin RN (oncoming nurse) by Mariya Nettles (offgoing nurse). Report included the following information SBAR, Kardex, ED Summary, Procedure Summary, Recent Results, Med Rec Status and Cardiac Rhythm Sinus rhythm.

## 2021-12-05 VITALS
DIASTOLIC BLOOD PRESSURE: 73 MMHG | HEART RATE: 78 BPM | RESPIRATION RATE: 18 BRPM | BODY MASS INDEX: 40.43 KG/M2 | OXYGEN SATURATION: 96 % | WEIGHT: 315 LBS | TEMPERATURE: 97.5 F | HEIGHT: 74 IN | SYSTOLIC BLOOD PRESSURE: 136 MMHG

## 2021-12-05 LAB
ANION GAP SERPL CALC-SCNC: 6 MMOL/L (ref 5–15)
BASOPHILS # BLD: 0 K/UL (ref 0–0.1)
BASOPHILS NFR BLD: 0 % (ref 0–1)
BUN SERPL-MCNC: 24 MG/DL (ref 6–20)
BUN/CREAT SERPL: 29 (ref 12–20)
CALCIUM SERPL-MCNC: 8.6 MG/DL (ref 8.5–10.1)
CHLORIDE SERPL-SCNC: 99 MMOL/L (ref 97–108)
CO2 SERPL-SCNC: 29 MMOL/L (ref 21–32)
CREAT SERPL-MCNC: 0.84 MG/DL (ref 0.7–1.3)
CRP SERPL-MCNC: <0.29 MG/DL (ref 0–0.6)
D DIMER PPP FEU-MCNC: 0.34 MG/L FEU (ref 0–0.65)
DIFFERENTIAL METHOD BLD: ABNORMAL
EOSINOPHIL # BLD: 0 K/UL (ref 0–0.4)
EOSINOPHIL NFR BLD: 0 % (ref 0–7)
ERYTHROCYTE [DISTWIDTH] IN BLOOD BY AUTOMATED COUNT: 14.4 % (ref 11.5–14.5)
FERRITIN SERPL-MCNC: 444 NG/ML (ref 26–388)
GLUCOSE BLD STRIP.AUTO-MCNC: 142 MG/DL (ref 65–117)
GLUCOSE BLD STRIP.AUTO-MCNC: 169 MG/DL (ref 65–117)
GLUCOSE SERPL-MCNC: 162 MG/DL (ref 65–100)
HCT VFR BLD AUTO: 42.5 % (ref 36.6–50.3)
HGB BLD-MCNC: 14.5 G/DL (ref 12.1–17)
IMM GRANULOCYTES # BLD AUTO: 0.4 K/UL (ref 0–0.04)
IMM GRANULOCYTES NFR BLD AUTO: 3 % (ref 0–0.5)
LDH SERPL L TO P-CCNC: 310 U/L (ref 85–241)
LYMPHOCYTES # BLD: 1.9 K/UL (ref 0.8–3.5)
LYMPHOCYTES NFR BLD: 15 % (ref 12–49)
MAGNESIUM SERPL-MCNC: 2.7 MG/DL (ref 1.6–2.4)
MCH RBC QN AUTO: 29.2 PG (ref 26–34)
MCHC RBC AUTO-ENTMCNC: 34.1 G/DL (ref 30–36.5)
MCV RBC AUTO: 85.5 FL (ref 80–99)
MONOCYTES # BLD: 1.3 K/UL (ref 0–1)
MONOCYTES NFR BLD: 10 % (ref 5–13)
NEUTS SEG # BLD: 9.3 K/UL (ref 1.8–8)
NEUTS SEG NFR BLD: 72 % (ref 32–75)
NRBC # BLD: 0 K/UL (ref 0–0.01)
NRBC BLD-RTO: 0 PER 100 WBC
PLATELET # BLD AUTO: 417 K/UL (ref 150–400)
PLATELET COMMENTS,PCOM: ABNORMAL
PMV BLD AUTO: 10.3 FL (ref 8.9–12.9)
POTASSIUM SERPL-SCNC: 3.5 MMOL/L (ref 3.5–5.1)
RBC # BLD AUTO: 4.97 M/UL (ref 4.1–5.7)
RBC MORPH BLD: ABNORMAL
SERVICE CMNT-IMP: ABNORMAL
SERVICE CMNT-IMP: ABNORMAL
SODIUM SERPL-SCNC: 134 MMOL/L (ref 136–145)
WBC # BLD AUTO: 12.9 K/UL (ref 4.1–11.1)

## 2021-12-05 PROCEDURE — 74011636637 HC RX REV CODE- 636/637: Performed by: STUDENT IN AN ORGANIZED HEALTH CARE EDUCATION/TRAINING PROGRAM

## 2021-12-05 PROCEDURE — 85379 FIBRIN DEGRADATION QUANT: CPT

## 2021-12-05 PROCEDURE — 74011250636 HC RX REV CODE- 250/636: Performed by: STUDENT IN AN ORGANIZED HEALTH CARE EDUCATION/TRAINING PROGRAM

## 2021-12-05 PROCEDURE — 82962 GLUCOSE BLOOD TEST: CPT

## 2021-12-05 PROCEDURE — 86140 C-REACTIVE PROTEIN: CPT

## 2021-12-05 PROCEDURE — 83615 LACTATE (LD) (LDH) ENZYME: CPT

## 2021-12-05 PROCEDURE — 85025 COMPLETE CBC W/AUTO DIFF WBC: CPT

## 2021-12-05 PROCEDURE — 77010033678 HC OXYGEN DAILY

## 2021-12-05 PROCEDURE — 99238 HOSP IP/OBS DSCHRG MGMT 30/<: CPT | Performed by: FAMILY MEDICINE

## 2021-12-05 PROCEDURE — 97116 GAIT TRAINING THERAPY: CPT

## 2021-12-05 PROCEDURE — 83735 ASSAY OF MAGNESIUM: CPT

## 2021-12-05 PROCEDURE — 82728 ASSAY OF FERRITIN: CPT

## 2021-12-05 PROCEDURE — 36415 COLL VENOUS BLD VENIPUNCTURE: CPT

## 2021-12-05 PROCEDURE — 74011250637 HC RX REV CODE- 250/637: Performed by: STUDENT IN AN ORGANIZED HEALTH CARE EDUCATION/TRAINING PROGRAM

## 2021-12-05 PROCEDURE — 80048 BASIC METABOLIC PNL TOTAL CA: CPT

## 2021-12-05 PROCEDURE — 74011250637 HC RX REV CODE- 250/637: Performed by: INTERNAL MEDICINE

## 2021-12-05 PROCEDURE — 97530 THERAPEUTIC ACTIVITIES: CPT

## 2021-12-05 PROCEDURE — 94760 N-INVAS EAR/PLS OXIMETRY 1: CPT

## 2021-12-05 RX ORDER — BENZONATATE 200 MG/1
200 CAPSULE ORAL
Qty: 15 CAPSULE | Refills: 0 | Status: SHIPPED | OUTPATIENT
Start: 2021-12-05 | End: 2021-12-10

## 2021-12-05 RX ORDER — INSULIN LISPRO 100 [IU]/ML
70 INJECTION, SOLUTION INTRAVENOUS; SUBCUTANEOUS
Status: DISCONTINUED | OUTPATIENT
Start: 2021-12-05 | End: 2021-12-05 | Stop reason: HOSPADM

## 2021-12-05 RX ADMIN — BARICITINIB 4 MG: 2 TABLET, FILM COATED ORAL at 08:51

## 2021-12-05 RX ADMIN — OXYCODONE HYDROCHLORIDE AND ACETAMINOPHEN 500 MG: 500 TABLET ORAL at 08:53

## 2021-12-05 RX ADMIN — INSULIN GLARGINE 50 UNITS: 100 INJECTION, SOLUTION SUBCUTANEOUS at 08:39

## 2021-12-05 RX ADMIN — Medication 1 CAPSULE: at 08:52

## 2021-12-05 RX ADMIN — TAMSULOSIN HYDROCHLORIDE 0.4 MG: 0.4 CAPSULE ORAL at 08:52

## 2021-12-05 RX ADMIN — FAMOTIDINE 20 MG: 20 TABLET, FILM COATED ORAL at 08:53

## 2021-12-05 RX ADMIN — ATORVASTATIN CALCIUM 40 MG: 20 TABLET, FILM COATED ORAL at 08:52

## 2021-12-05 RX ADMIN — Medication 10 ML: at 06:11

## 2021-12-05 RX ADMIN — HYDROCHLOROTHIAZIDE 25 MG: 25 TABLET ORAL at 08:52

## 2021-12-05 RX ADMIN — APIXABAN 5 MG: 5 TABLET, FILM COATED ORAL at 08:53

## 2021-12-05 RX ADMIN — INSULIN LISPRO 3 UNITS: 100 INJECTION, SOLUTION INTRAVENOUS; SUBCUTANEOUS at 12:44

## 2021-12-05 RX ADMIN — FUROSEMIDE 20 MG: 10 INJECTION, SOLUTION INTRAMUSCULAR; INTRAVENOUS at 08:48

## 2021-12-05 RX ADMIN — INSULIN LISPRO 3 UNITS: 100 INJECTION, SOLUTION INTRAVENOUS; SUBCUTANEOUS at 08:37

## 2021-12-05 RX ADMIN — INSULIN LISPRO 70 UNITS: 100 INJECTION, SOLUTION INTRAVENOUS; SUBCUTANEOUS at 12:43

## 2021-12-05 RX ADMIN — POLYETHYLENE GLYCOL 3350 17 G: 17 POWDER, FOR SOLUTION ORAL at 08:53

## 2021-12-05 RX ADMIN — DILTIAZEM HYDROCHLORIDE 240 MG: 240 CAPSULE, COATED, EXTENDED RELEASE ORAL at 08:52

## 2021-12-05 RX ADMIN — INSULIN LISPRO 60 UNITS: 100 INJECTION, SOLUTION INTRAVENOUS; SUBCUTANEOUS at 08:35

## 2021-12-05 RX ADMIN — INSULIN HUMAN 90 UNITS: 100 INJECTION, SUSPENSION SUBCUTANEOUS at 08:38

## 2021-12-05 RX ADMIN — DEXAMETHASONE SODIUM PHOSPHATE 6 MG: 4 INJECTION, SOLUTION INTRAMUSCULAR; INTRAVENOUS at 08:50

## 2021-12-05 NOTE — PROGRESS NOTES
Problem: Mobility Impaired (Adult and Pediatric)  Goal: *Acute Goals and Plan of Care (Insert Text)  Description: FUNCTIONAL STATUS PRIOR TO ADMISSION: Patient was independent and active without use of DME.    HOME SUPPORT PRIOR TO ADMISSION: The patient lived with wife but did not require assist.    Physical Therapy Goals  Initiated 11/27/2021; reviewed 11/30/21 and remain appropriate, reviewed again 12/3/21: patient progressing, still appropriate:  1. Patient will move from supine to sit and sit to supine  in bed with independence within 7 day(s). 2.  Patient will transfer from bed to chair and chair to bed with independence using the least restrictive device within 7 day(s). 3.  Patient will perform sit to stand with independence within 7 day(s). 4.  Patient will ambulate with independence for 150 feet with the least restrictive device within 7 day(s). Outcome: Progressing Towards Goal   PHYSICAL THERAPY TREATMENT  Patient: Talya Garcia (56 y.o. male)  Date: 12/5/2021  Diagnosis: Acute hypoxemic respiratory failure due to COVID-19 ([facility]) [U07.1, J96.01]   Acute hypoxemic respiratory failure due to COVID-19 [facility])       Precautions: Contact (COVID; [facility] [facility])  Chart, physical therapy assessment, plan of care and goals were reviewed. ASSESSMENT  Patient continues with skilled PT services and is progressing towards goals. Patient seen for walking oximetry test. Patient presents with low endurance and decreased tolerance to activity requiring supplemental oxygen. Without oxygen he desaturates to the 80's and with 3L he is able to perform light activity. He was only able to ambulate 15' with no device with low endurance, dyspnea, with c/o fatigue and need for rest.  He maintains >90% at 3L. Patient expressing concerns for going home as he is below his PLOF of Indep and has low energy and is unable to care for himself. He states his wife is home and is COVID positive as well.   PT educated patient on rehab and patient declines this option preferring to go home with assistance. Will discuss this with case management but patient would benefit from HHPT and home caregivers to assist.    Pulse oximetry assessment   95% at rest on room air (if 88% or less, skip next steps)  80-88% while ambulating on room air  93% at rest on 2 LPM  91-92% while ambulating on 3 LPM         Current Level of Function Impacting Discharge (mobility/balance): supervision    Other factors to consider for discharge: Lives with spouse who has [place] currently         PLAN :  Patient continues to benefit from skilled intervention to address the above impairments. Continue treatment per established plan of care. to address goals. Recommendation for discharge: (in order for the patient to meet his/her long term goals)  Physical therapy at least 2 days/week in the home     This discharge recommendation:  A follow-up discussion with the attending provider and/or case management is planned    IF patient discharges home will need the following DME: none       SUBJECTIVE:   Patient stated I am not able to care for myself.     OBJECTIVE DATA SUMMARY:   Critical Behavior:  Neurologic State: Alert  Orientation Level: Oriented X4  Cognition: Appropriate decision making, Appropriate safety awareness, Follows commands  Safety/Judgement: Awareness of environment  Functional Mobility Training:  Bed Mobility:        Sit to Supine: Modified independent  Scooting: Modified independent        Transfers:  Sit to Stand: Stand-by assistance  Stand to Sit: Supervision        Bed to Chair: Supervision                    Balance:  Sitting: Intact  Standing: Intact  Standing - Static: Good  Ambulation/Gait Training:  Distance (ft): 20 Feet (ft)  Assistive Device: Gait belt  Ambulation - Level of Assistance: Supervision        Gait Abnormalities: Decreased step clearance              Speed/Jadyn: Slow; Pace decreased (<100 feet/min) Stairs:NT          Pain Ratin/10    Activity Tolerance:   desaturates with exertion and requires oxygen, requires frequent rest breaks, and observed SOB with activity    After treatment patient left in no apparent distress:   Supine in bed and Call bell within reach    COMMUNICATION/COLLABORATION:   The patients plan of care was discussed with: Registered nurse.      Angela Lauren DPT   Time Calculation: 28 mins

## 2021-12-05 NOTE — DISCHARGE INSTRUCTIONS
HOME DISCHARGE INSTRUCTIONS    Stefanie Thakur / [id number] : 1960    Admission date: 2021 Discharge date: 2021 3:41 PM       Please bring this form with you to show your care provider at your follow-up appointment. Primary care provider:  Patricia Tracey DO    Discharging provider:  Piotr Delarosa MD  - Family Medicine Resident  Bruno Barragan MD - Family Medicine Attending      You have been admitted to the hospital with the following diagnoses:    ACUTE DIAGNOSES:  Acute hypoxemic respiratory failure due to COVID[facility]19 ([facility] [U07.1, J96.01]    . . . . . . . . . . . . . . . . . . . . . . . . . . . . . . . . . . . . . . . . . . . . . . . . . . . . . . . . . . . . . . . . . . . . . . . .   You are well enough to be discharged from the hospital. However, because you were inpatient in a hospital, you are at greater risk of having been exposed to the coronavirus. PLEASE stay inside and self-quarantine for 14 days to prevent further spread of the coronavirus. . . . . . . . . . . . . . . . . . . . . . . . . . . . . . . . . . . . . . . . . . . . . . . . . . . . . . . . . . . . . . . . . . . . . . . . Jose A Ward FOLLOW-UP CARE RECOMMENDATIONS:    AVOID MEDICATIONS LIKE ASPIRIN, ALEVE, MOTRIN, ADVIL, AND IBUPROFEN FOR PAIN. YOU CAN TAKE TYLENOL FOR FEVER AND PAIN INSTEAD (500MG EVERY 6 HOURS)    Please discuss your insulin and diabetes regimen with your primary care physician. Also, talk with your primary care doctor about speaking with a diabetes educator. Please, schedule an appointment with Endocrinologist for diabetes management. Schedule appointment with sleep medicine for diagnosis and management of sleep apnea.      Appointments  Follow-up Information     Follow up With Specialties Details Why Contact Info    Gail Mackey MD Sleep Medicine Schedule an appointment as soon as possible for a visit  [address] [address]  [phone]      [facility]   If you need assistance with the concentrator today- call Manish at [phone]. If you have questions on Monday, call the office at [phone] [address]    Adrianna Villegas MD Endocrinology Schedule an appointment as soon as possible for a visit Please follow-up with endocrinology for management of your diabetes. GILLIAN Vic[address]      Matias Grimaldo MD Family Medicine Go on 12/9/2021 Virtual Appointment at 9:40am for hospital follow up [address] [facility] [address] [facility]  [phone]    [facility]  Urgent care in the home [address]   [address] [facility] [address] [phone]           For your COVID symptoms, it is important to stay hydrated and continue to use your incentive spirometer! Check your blood sugars EVERY day and adjust your Tuojeo according to the following parameters:   - If your fasting blood sugar is 0-200, no adjustment necessary  - If your fasting blood sugar is 201-250, increase your Tuojeo by 2 units  - If your fasting blood sugar is 251-300, increase your Tuojeo by 4 units  - If your fasting blood sugar is 301-350, increase your Tuojeo by 6 units  - If your fasting blood sugar is 351-400, increase your Tuojeo by 8 units  - If your fasting blood sugar is 401-450, increase your Tuojeo by 10 units  - If your fasting blood sugar is 451-500, increase your Tuojeo by 12 units  Continue to check your blood sugar every day and make the changes as above to your insulin regimen every 3 days. If you have a reading OVER 500, please call your primary care doctor. Follow-up tests needed:   - Follow up with lymphedema clinic  - Sleep study    Pending test results:    At the time of your discharge the following test results are still pending: None  Please make sure you review these results with your outpatient follow-up provider(s). DIET/what to eat:  Diabetic Diet    ACTIVITY:  Activity as tolerated    Wound care: None    Equipment needed:  Home oxygen 3L nasal cannula     Specific symptoms to watch for: chest pain, shortness of breath, fever, chills, nausea, vomiting, diarrhea, change in mentation, falling, weakness, bleeding. What to do if new or unexpected symptoms occur? If you experience any of the above symptoms (or should other concerns or questions arise after discharge) please call your primary care physician. Return to the emergency room if you cannot get hold of your doctor. · It is very important that you keep your follow-up appointment(s). · Please bring discharge papers, medication list (and/or medication bottles) to your follow-up appointments for review by your outpatient provider(s). · Please check the list of medications and be sure it includes every medication (even non-prescription medications) that your provider wants you to take. · It is important that you take the medication exactly as they are prescribed. · Keep your medication in the bottles provided by the pharmacist and keep a list of the medication names, dosages, and times to be taken in your wallet. · Do not take other medications without consulting your doctor. · If you have any questions about your medications or other instructions, please talk to your nurse or care provider before you leave the hospital.     Information obtained by:     I understand that if any problems occur once I am at home I am to contact my physician. These instructions were explained to me and I had the opportunity to ask questions. I understand and acknowledge receipt of the instructions indicated above.                                                                                                                                                Physician's or R.N.'s Signature                                                                  Date/Time                                                                                                                                              Patient or Representative Signature                                                          Date/Time

## 2021-12-05 NOTE — PROGRESS NOTES
4:04 PM    Spoke to Ms. Dwayne Goff and Mr. Dwayne Goff about discharge planning for today. The patient is medically stable from our standpoint to be discharge home. The patient was found standing and walking to the bathroom independently. He states he feels a lot better. Patient will be discharge home with [facility] and he already has O2 at home. Wife stated she will pick him up before 6 pm but if she can't make it before 6 she would pick him up tomorrow in AM. Patient is in agreement with plan and asked to be dressed up. CM aware.      Luz Maria Bryant MD

## 2021-12-05 NOTE — PROGRESS NOTES
4:13 PM  CM spoke to patients wife and offered to set up transport. She declined and said she will pick him up. CM reinforced that she can call the on call  to discuss concentrator concerns- she will call if she has any questions- drivers number is on the AVS. Nidia Dan    3:38 PM  CM spoke to patients wife and let her know that [facility] accept and let her know [facility] will accept and will add information to AVS. CM will provide patient with portable O2- since patient is on isolation- will provide to RN to give at dc. CM will notify [facility] patient is discharging today. Nidia Dan      3:34 PM  CM checked referral to [facility], they are unable to accept patient. Patient's spouse is agreeable to [facility], will add information to AVS. Once discharged, will send AVS. Nidia Dan      2:51 PM  CM called and spoke to patients spouse, she informed CM that they have received the concentrator from when the patient was first in the hospital. She will need to talk to [facility] before he comes home to discuss how to use the concentrator as [facility] . Patient will need a portable from the closet when he discharges. Once CM is clear of when he will discharge, CM will deliver this portable. Patient's spouse prefers [employer]. Referral has been sent. Patient has been accepted by [facility], if he is not accepted by [facility]- they are agreeable to [facility]. [facility] will need to be notified. CM continuing to follow. Nidia Dan, MSW        2:23 PM    CM noted order for home O2. Patient had been set up previously when it was thought he would discharge on 11/29/21, CM sent [facility] the new updated order and documentation for the home O2 and called the  to let him to of the patients probable dc today.  CM called and spoke to the patient and he feels like he is not ready to dc today. CM notified the MD. If the dc plan remains for the patient to dc today, the patient will need to call the on call  for [facility] to alert him he is leaving today. CM will provide the number once the plan is finalized. PT recommended home health. CM noted a referral had been placed with [address] [place] [address] on 12/4/21. Checked referral and reads New Request. Called on call worker with [facility] [employer] and she has not received the order. CM resent the order. In case they are unable to accept, CM sent the order to several other home health agencies, awaiting response.      Nidia Dan, MSW  Care Manager

## 2021-12-05 NOTE — PROGRESS NOTES
Bedside shift change report given to Susana Saunders RN (oncoming nurse) by Cheryl Cordoba RN (offgoing nurse). Report included the following information SBAR, Kardex, Intake/Output, MAR, Recent Results and Med Rec Status.

## 2021-12-05 NOTE — DISCHARGE SUMMARY
[address] [place] [address]   Office [phone]  Fax [phone]       Discharge / Transfer / Off-Service Note     Name: Aurora Issa MRN: [id number]  Sex: Male   YOB: 1960  Age: 64 y.o. PCP: Pearl Gutierrez DO     Date of admission: 11/26/2021  Date of discharge/transfer: 12/5/2021    Attending physician at admission: Solo Membreno Attending physician at discharge/transfer: Dr. Evy Pepper  Resident physician at discharge/transfer: Dangelo Alfaro MD     Consultants during hospitalization  IP CONSULT TO PULMONOLOGY   CONSULT TO  ENDOCRINOLOGY      Admission diagnoses   Acute hypoxemic respiratory failure due to COVID-19 ([facility] [address] [U07.1, J96.01]    Recommended follow-up after discharge    1. PCP-Rohith Lockett DO  2. Endocrinology- Dr. Sandee Thrasher  3. Sleep Medicine- Elsa Donovan MD     Things to follow up on with PCP:   - Continue home O2, wean as tolerated   - Patient needs a sleep study for evaluation of AIDEE. - Continue monitoring his BG at home and needs to follow up with Endocrinology for marked hyperglycemia evident during admission.   ----------------------------------------------------------------------------------------------------------------------------  The patient was well enough to be discharged from the hospital. However, because they were inpatient in a hospital, they are at greater risk of having been exposed to the coronavirus. PLEASE stay inside and self-quarantine for 14 days to prevent further spread of the coronavirus.  ------------------------------------------------------------------------------------------------------------------    Medication Changes:    Current Discharge Medication List      START taking these medications    Details   benzonatate (TESSALON) 200 mg capsule Take 1 Capsule by mouth three (3) times daily as needed for Cough for up to 5 days.   Qty: 15 Capsule, Refills: 0  Start date: 12/5/2021, End date: 12/10/2021 CONTINUE these medications which have NOT CHANGED    Details   insulin glargine U-300 conc (Toujeo Max U-300 SoloStar) 300 unit/mL (3 mL) inpn 100 Units by SubCUTAneous route nightly. apixaban (ELIQUIS) 5 mg tablet Take 5 mg by mouth two (2) times a day. Indications: blood clot in a deep vein of the extremities      metoprolol succinate (TOPROL-XL) 50 mg XL tablet Take 50 mg by mouth daily. insulin lispro (HUMALOG) 100 unit/mL kwikpen by SubCUTAneous route Before breakfast, lunch, and dinner. Sliding scale      dilTIAZem ER (CARDIZEM LA) 240 mg Tb24 tablet Take 240 mg by mouth daily. hydroCHLOROthiazide (HYDRODIURIL) 25 mg tablet Take 25 mg by mouth daily. rosuvastatin (CRESTOR) 20 mg tablet Take 20 mg by mouth nightly. ondansetron (ZOFRAN ODT) 4 mg disintegrating tablet Take 1 Tablet by mouth every eight (8) hours as needed for Nausea or Vomiting. Qty: 20 Tablet, Refills: 3      triamcinolone acetonide (KENALOG) 0.1 % topical cream Apply  to affected area three (3) times daily as needed for Skin Irritation. use thin layer  Qty: 45 g, Refills: 1    Associated Diagnoses: Poison ivy dermatitis      metFORMIN (GLUCOPHAGE) 1,000 mg tablet Take 1,000 mg by mouth two (2) times daily (with meals). STOP taking these medications       atorvastatin (LIPITOR) 40 mg tablet Comments:   Reason for Stopping:         aspirin delayed-release 81 mg tablet Comments:   Reason for Stopping:               History of Present Illness  Amanda Gomes is a 64 y.o. unvaccinated male with PMHx of HTN, DM2, HLD, chronic lymphedema, history of right femoral DVT in 2020 now on [facility], who presents to the ED complaining of shortness of breath, cough, and decreased oral intake. He states that he tested positive for COVID on 11/18/21, and symptom onset was 11/18/21.  Was at a [employer] Thanksgiving dinner prior to onset of symptoms and was later notified that 22 of the couples present at the dinner had tested positive for COVID. Therefore, when he began experiencing shortness of breath, he and his wife performed home COVID tests which were positive, then re-tested at [facility] on 11/19, where PCR and rapid COVID tests were reportedly positive as well. Since onset, he has experienced cough with productive clear sputum, nasal congestion, poor PO intake, nausea (without vomiting), watery diarrhea (non-bloody, not dark), and palpitations on rising from seated position and with any degree of exertion. Had Tmax of 101-102 yesterday evening. Also with dysuria and urinary urgency/frequency. He was having markedly worsened shortness of breath today, which prompted call to emergency services today. Patient was admitted from 11/26/2021 to 12/05/21. Hospital course    Acute hypoxic respiratory failure 2/2 COVID pneumonia: Clinically much improved since admission. Patient received Baricitinib from 11/28 until 1/05. He completed Decadron for 10 days. 11/28 chest CTA showed multifocal groundglass opacification and areas of consolidation throughout both lower lobes with additional involvement of the left upper lobe, right upper lobe, and right middle lobe. No PE. The patient benefited from BiPAP use HS. Pulmonology was consulted and patient was successfully weaned off from HFNC to 3L NC. Patient was discharge home with [facility] PT and 3LNC. - Take Tessalon for cough at home  - F/u with Dr. Nicolle Sheehan on 12/09.  - Schedule appointment with Sleep Medicine for AIDEE workup. .  - Continue O2, wean as tolerated.      T2DM: Last A1c 7.9% in 09/2020. Presented with hyperglycemia and difficult to control BG during admission requiring very high doses insulin. - Continue home regimen Lantus 100u QHS, Lispro 100 TID with meals, Metformin 1000mg BID and Toujeo sliding scale.  -Follow up with Dr. Sherrie Red (Endocrinology) outpatient and would benefit from diabetes education. History of DVT: Unprovoked and apparent unclear etiology. On Eliquis at home.  F/b Dr. Elo Vizcarra  - Continue Eliquis 5mg BID    AIDEE: Officially undiagnosed. Patient would desaturate at night. His vitals improved during admission while on BiPAP at night.   - Schedule appointment with Sleep Medicine for AIDEE workup.      Hypokalemia: Resolved    HLD: Last lipid panel 08/2020 with , HDL 43, LDL 99, . On Rosuvastatin 20mg daily at home. - Will substitute with Atorvastatin 40mg daily      HTN:   - Continue Metoprolol XL 50mg daily, Diltiazem 240mg daily, and HCTZ 25mg daily.   - Follow up outpatient.     Chronic lymphedema:   - Compression stockings ordered  - Continue care with HH and with PCP outpatient.  - Recommended to elevate lower extremities. Obesity:  - The pt's Body mass index is 48.15 kg/m². - Encouraging lifestyle modifications and further follow up outpatient.     Dysuria: Resolved. UA unremarkable.     Lactic acidosis: Resolved     Troponinemia (resolved): Troponin of 15 -> 22 -> 15. Patient remained asymptomatic.     Physical exam at discharge:    General: No acute distress. Alert. Cooperative. Obese male positioned comfortably in his chair. Respiratory: CTA x2  Cardiovascular: Regular rate and rhythm. Distal heart sounds. GI: + bowel sounds. Nontender. No rebound tenderness or guarding. Protuberant abdomen  Extremities:  chronic venous stasis changes   Skin: Warm, dry. Lymphedema in bilateral LE. Neuro: Awake, alert, and appropriately conversant.       Vitals Reviewed. Patient Vitals for the past 12 hrs:   Temp Pulse Resp BP SpO2   12/05/21 1540 97.5 °F (36.4 °C) 78 18 136/73 96 %   12/05/21 1449  81      12/05/21 1110 98.1 °F (36.7 °C) 86 18 (!) 150/75 90 %   12/05/21 0811 98 °F (36.7 °C) 65 19 (!) 151/74 92 %   12/05/21 0802  60           Condition at discharge: Stable.     Labs  Recent Labs     12/05/21 0429 12/04/21  0403 12/03/21  0004   WBC 12.9* 11.7* 10.5   HGB 14.5 13.5 14.6   HCT 42.5 39.7 42.9   * 357 424*     Recent Labs     12/05/21 0429 12/04/21  0403 12/03/21  0004   * 135* 135*   K 3.5 3.6 3.5   CL 99 98 97   CO2 29 28 28   BUN 24* 25* 26*   CREA 0.84 1.05 1.06   * 283* 239*   CA 8.6 8.5 8.6   MG 2.7* 2.2 2.2     No results for input(s): ALT, AP, TBIL, TBILI, TP, ALB, GLOB, GGT, AML, LPSE in the last 72 hours. No lab exists for component: SGOT, GPT, AMYP, HLPSE  Recent Labs     12/05/21  1209 12/05/21  0808 12/05/21  0429 12/04/21  2123 12/04/21  1634 12/04/21  1215 12/04/21  0706 12/04/21  0403   FERR  --   --  444*  --   --   --   --  408*   GLUCPOC 142* 169*  --  283* 339* 315*   < >  --     < > = values in this interval not displayed. Micro:  Lab Results   Component Value Date/Time    Culture result: NO GROWTH 6 DAYS 11/26/2021 04:20 PM    Culture result: NO GROWTH 5 DAYS 08/15/2020 11:28 PM    Culture result: No growth (<1,000 CFU/ML) 08/15/2020 04:45 AM       Imaging:  CTA CHEST W OR W WO CONT    Result Date: 11/28/2021  INDICATION:   Rapdily increasing O2 requirement, COVID+, c/f PE COMPARISON:  CT August 2012 TECHNIQUE:  Following the uneventful intravenous administration of 100 cc Isovue 036, thin helical axial images were obtained through the chest. Postprocessing was performed. 3D image postprocessing was performed. CT dose reduction was achieved through the use of a standardized protocol tailored for this examination and automatic exposure control for dose modulation. FINDINGS: There is reactive mediastinal lymphadenopathy. The heart is enlarged. There is no pericardial effusion. No filling defect is seen within the pulmonary arterial system to suggest pulmonary embolus. The aorta is normal in caliber. There is multifocal groundglass opacification and areas of consolidation throughout both lower lobes with additional involvement of the left upper lobe, right upper lobe, and right middle lobe. No masses are identified. There is no pneumothorax.  Limited evaluation of the upper abdomen demonstrates no abnormality. The osseous structures are unremarkable. 1.  No evidence of pulmonary embolus. 2.  Covid 19 pneumonia. XR CHEST PORT    Result Date: 11/28/2021  EXAM:  XR CHEST PORT INDICATION:   Increased oxygen requirement COMPARISON: Chest radiograph 11/26/2021. FINDINGS: AP radiograph of the chest was obtained. There is worsening airspace disease in the bilateral lower lungs. There is a suspected new small right pleural effusion. No pneumothorax. Stable cardiomediastinal silhouette appear     1. Worsening airspace disease in the bilateral lower lungs, which may represent atelectasis or infection/aspiration. Suspected new small right pleural effusion. XR CHEST PORT    Result Date: 11/26/2021  INDICATION:  COVID, hypoxia COMPARISON: August 2020 FINDINGS: Single AP portable view of the chest obtained at 1645 demonstrates a stable cardiomediastinal silhouette. There is chronic cardiomegaly. The lungs are hypoinspiratory and the patient is morbidly obese. There is bilateral multifocal airspace disease, right perihilar and left lung base. Bilateral multifocal airspace disease. ECHO ADULT COMPLETE    Result Date: 11/29/2021  · Image quality for this study was suboptimal. · Contrast used: DEFINITY. · AV: Probably trileaflet aortic valve. · LV: Estimated LVEF is 60 - 65%. Normal cavity size and systolic function (ejection fraction normal). Mildly increased wall thickness.         Chronic diagnoses   Problem List as of 12/5/2021 Date Reviewed: 12/3/2021          Codes Class Noted - Resolved    Positive D dimer ICD-10-CM: R79.89  ICD-9-CM: 790.92  12/3/2021 - Present        Constipation ICD-10-CM: K59.00  ICD-9-CM: 564.00  12/3/2021 - Present        Chronic acquired lymphedema ICD-10-CM: I89.0  ICD-9-CM: 457.1  11/27/2021 - Present        BPH (benign prostatic hyperplasia) ICD-10-CM: N40.0  ICD-9-CM: 600.00  11/27/2021 - Present        Elevated troponin ICD-10-CM: R77.8  ICD-9-CM: 790.6  11/27/2021 - Present        History of DVT (deep vein thrombosis) ICD-10-CM: G68.334  ICD-9-CM: V12.51  11/27/2021 - Present        * (Principal) Acute hypoxemic respiratory failure due to COVID-19 [facility]) ICD-10-CM: U07.1, J96.01  ICD-9-CM: 518.81, 079.89, 799.02  11/26/2021 - Present        Severe sepsis ([address] ICD-10-CM: A41.9, R65.20  ICD-9-CM: 038.9, 995.92  8/15/2020 - Present        SIRS (systemic inflammatory response syndrome) (HCC) ICD-10-CM: R65.10  ICD-9-CM: 995.90  8/15/2020 - Present        Lactic acidosis ICD-10-CM: E87.2  ICD-9-CM: 276.2  8/15/2020 - Present        AR (allergic rhinitis) ICD-10-CM: J30.9  ICD-9-CM: 477.9  6/11/2009 - Present        Hyperlipemia (Chronic) ICD-10-CM: E78.5  ICD-9-CM: 272.4  3/12/2009 - Present        HTN (hypertension) (Chronic) ICD-10-CM: I10  ICD-9-CM: 401.9  3/12/2009 - Present        Type 2 diabetes mellitus with hyperglycemia, with long-term current use of insulin (HCC) ICD-10-CM: E11.65, Z79.4  ICD-9-CM: 250.00, 790.29, V58.67  3/12/1999 - Present              Diet:  Diabetic diet. Activity:  As tolerated     Disposition: [facility]    Discharge instructions to patient/family  Please seek medical attention for any new or worsening symptoms particularly fever, chest pain, shortness of breath, abdominal pain, nausea, vomiting    Follow up plans/appointments  Follow-up Information     Follow up With Specialties Details Why Contact Info    Nonnie Dandy, MD Sleep Medicine Schedule an appointment as soon as possible for a visit  [address] [place] [address] Pkwy  [phone]      [facility]   If you need assistance with the concentrator today- call Raul Bob at [phone]. If you have questions on Monday, call the office at [phone] [address]    Henry Dalal MD Endocrinology Schedule an appointment as soon as possible for a visit Please follow-up with endocrinology for management of your diabetes. [address] [facility]  [phone]      Junaid Zhang MD Family Medicine Go on 12/9/2021 Virtual Appointment at 9:40am for hospital follow up [address] [place] [address] [address], [address] [facility]  [phone]    [facility]  Urgent care in the home [address] [place], [address]   [address]  [address] [place] [address]  [phone]             Daryl Cordoba MD  Family Medicine Resident       For Billing    Chief Complaint   Patient presents with   [facility] Problems  Date Reviewed: 12/3/2021          Codes Class Noted POA    Positive D dimer ICD-10-CM: R79.89  ICD-9-CM: 790.92  12/3/2021 Unknown        Constipation ICD-10-CM: K59.00  ICD-9-CM: 564.00  12/3/2021 Unknown        Chronic acquired lymphedema ICD-10-CM: I89.0  ICD-9-CM: 457.1  11/27/2021 Unknown        BPH (benign prostatic hyperplasia) ICD-10-CM: N40.0  ICD-9-CM: 600.00  11/27/2021 Yes        Elevated troponin ICD-10-CM: R77.8  ICD-9-CM: 790.6  11/27/2021 Yes        History of DVT (deep vein thrombosis) ICD-10-CM: Z86.718  ICD-9-CM: V12.51  11/27/2021 Unknown        * (Principal) Acute hypoxemic respiratory failure due to COVID-19 [facility]) ICD-10-CM: U07.1, J96.01  ICD-9-CM: 518.81, 079.89, 799.02  11/26/2021 Yes        Lactic acidosis ICD-10-CM: E87.2  ICD-9-CM: 276.2  8/15/2020 Yes        Hyperlipemia (Chronic) ICD-10-CM: E78.5  ICD-9-CM: 272.4  3/12/2009 Yes        HTN (hypertension) (Chronic) ICD-10-CM: I10  ICD-9-CM: 401.9  3/12/2009 Yes        Type 2 diabetes mellitus with hyperglycemia, with long-term current use of insulin ([facility] ICD-10-CM: E11.65, Z79.4  ICD-9-CM: 250.00, 790.29, V58.67  3/12/1999 Yes

## 2021-12-05 NOTE — PROGRESS NOTES
Verbal shift change report given to Renata Lambert RN (oncoming nurse) by Kiya Foote RN (offgoing nurse). Report included the following information SBAR, Kardex, Procedure Summary, Intake/Output, MAR, Accordion, Recent Results and Med Rec Status.

## 2021-12-05 NOTE — PROGRESS NOTES
I have reviewed discharge instructions with the patient. The patient verbalized understanding. IV removed. Set Pt up with portable O2 on 3L. Signed copy of AVS placed in Pt chart. Pt transported out by wheelchair by PCT.

## 2021-12-05 NOTE — PROGRESS NOTES
[address] [facility] [address] [place] [address]   Office [phone]  Fax [phone]          Assessment and Plan     Julio Mott is a 64 y.o. male with a PMH of HTN, DM2, HLD, chronic lymphedema, history of right femoral DVT in 2020 now on [facility] admitted for acute hypoxic respiratory failure 2/2 COVID PNA and lactic acidosis. Patient was admitted on 11/26/2021.    24-Hour Events: No acute events overnight. BiPap worn. Acute hypoxic respiratory failure 2/2 COVID pneumonia: Clinically much improved since admission. Currently at 2L NC saturating at 96%. . Started on Baricitinib on 11/28.  - RT to walk with patient today, plan to discharge with home O2.   - Decadron to 6mg daily. Last dose today to complete 10 days. - BiPap overnight   - Continue Baricitinib 4 mg daily (started 11/28)  - Lasix IV 20mg daily  - Pulmonology following, appreciate further recommendations  - COVID meds (Atorvastatin 40mg every day, Vitamin C 500mg BID, and Zinc 220mg every day)  - CBC with diff daily, COVID labs (CRP, D-dimer, ferritin, LD) daily, currently downtrending     T2DM: Last A1c 7.9% in 09/2020. On Lantus 100u QHS, SSI Lispro, Metformin 1000mg BID at home. - Endocrinology consulted, appreciate recs. - Lantus 100u daily   - Lispro 700 units TID w/ meals  - NPH 90u linked with Dexamethasone  - SSI / ACHS, Hypoglycemia protocols ordered     History of DVT: On Eliquis at home. F/b Dr. Clyde Maier. Unclear etiology - not provoked per Dr. Slade Common note. Per chart review, pt started on Xarelto, however pt reportedly taking Eliquis per medication reconciliation with wife over phone.  - Continue Eliquis 5mg BID  - OOB as tolerated     Hypokalemia: Resolved POA 3.1. Wnl.  - BMP and Mg daily  - Replete as needed  - Patient on remote tele    AIDEE: Recommend BiPAP at night.      HLD: Last lipid panel 08/2020 with , HDL 43, LDL 99, . On Rosuvastatin 20mg daily at home.   - Will substitute with Atorvastatin 40mg daily      HTN: POA /68. On Metoprolol XL 50mg daily, Diltiazem 240mg daily, and HCTZ 25mg daily. - Will continue BP meds  - Continue IV Lasix 20mg daily  - Monitor and adjust as required     Chronic lymphedema: Chronic venous stasis and 1+ pitting edema noted on exam. Pt states he uses compression stockings at home. - Compression stockings ordered  - Lasix as above  - Elevate lower extremities     BPH: Chronic, stable. - Continue Alfuzosin 10mg daily - pharm to substitute     Obesity:  - The pt's Body mass index is 48.15 kg/m². - Encouraging lifestyle modifications and further follow up outpatient. Dysuria: Noted per pt. Likely 2/2 decreased UOP in setting of poor PO intake. UA now without evidence of infection.  - Continue to monitor VS, labs, and symptoms  - Bladder checks and straight cath PRN    Lactic acidosis: Resolved    Troponinemia (resolved): Troponin of 15 -> 22 -> 15. Pt without chest pain or tightness. Low concern for ACS. Per chart review, last [facility] 2007 was wnl.  - Patient on remote cardiac monitoring       FEN/GI - Diabetic diet. Activity - Out of bed with assistance  DVT prophylaxis - Eliquis  GI prophylaxis - Pepcid 20 BID  Fall prophylaxis - Fall precautions ordered. Disposition - Admit to Remote Telemetry. PT recommends 2 days/week home PT  Code Status - Partial (DNI). Discussed with patient / caregivers. Next of Katarina 69 Name and Krish Guevara Schroeder 23 - Haley Portillo MD  Family Medicine Resident    Patient discussed with Dr. Stella Benitez, [facility] Attending         Subjective / Objective       Patient resting comfortably in no acute distress. However, endorses he doesn't want to go home today. He denies any chills, chest pain, abdominal pain, vomiting, nausea or diarrhea.      Respiratory: O2 Flow Rate (L/min): 2 l/min O2 Device: Nasal cannula   Visit Vitals  BP (!) 151/74 (BP 1 Location: Left upper arm, BP Patient Position: At rest)   Pulse 65   Temp 98 °F (36.7 °C)   Resp 19   Ht 6' 2\" (1.88 m)   Wt (!) 359 lb 12.7 oz (163.2 kg)   SpO2 92%   BMI 46.19 kg/m²       General: No acute distress. Alert. Cooperative. Obese male positioned comfortably in bed  Respiratory: Fine bibasilar crackles. Cardiovascular: Regular rate and rhythm. Distal heart sounds. GI: + bowel sounds. Nontender. No rebound tenderness or guarding. Protuberant abdomen  Extremities:  1+ pitting LE edema bilateral lower extremities w/ chronic venous stasis changes - improved  Skin: Warm, dry. Lymphedema in bilateral LE. Neuro: Awake, alert, and appropriately conversant. I/O:  Date 12/04/21 0700 - 12/05/21 0659 12/05/21 0700 - 12/06/21 0659   Shift 7072-4482 2885-4690 24 Hour Total 9953-8841 5558-0982 24 Hour Total   INTAKE   P.O.  500 500        P. O.  500 500      Shift Total(mL/kg)  500(3.1) 500(3.1)      OUTPUT   Urine(mL/kg/hr) 650(0.3)  650(0.2)        Urine Voided 650  650        Urine Occurrence(s) 4 x 3 x 7 x      Emesis/NG output 0  0        Emesis 0  0        Emesis Occurrence(s) 0 x  0 x      Stool 0  0        Stool Occurrence(s) 1 x  1 x        Stool 0  0      Shift Total(mL/kg) 650(4)  650(4)      NET -650 500 -150      Weight (kg) 163.7 163.2 163.2 163.2 163.2 163.2       CBC:  Recent Labs     12/05/21 0429 12/04/21  0403 12/03/21  0004   WBC 12.9* 11.7* 10.5   HGB 14.5 13.5 14.6   HCT 42.5 39.7 42.9   * 357 414*       Metabolic Panel:  Recent Labs     12/05/21 0429 12/04/21  0403 12/03/21  0004   * 135* 135*   K 3.5 3.6 3.5   CL 99 98 97   CO2 29 28 28   BUN 24* 25* 26*   CREA 0.84 1.05 1.06   * 283* 239*   CA 8.6 8.5 8.6   MG 2.7* 2.2 2.2          For Billing    Chief Complaint   Patient presents with   [facility] Problems  Date Reviewed: 12/3/2021          Codes Class Noted POA    Positive D dimer ICD-10-CM: R79.89  ICD-9-CM: 790.92  12/3/2021 Unknown        Constipation ICD-10-CM: K59.00  ICD-9-CM: 564.00  12/3/2021 Unknown        Chronic acquired lymphedema ICD-10-CM: I89.0  ICD-9-CM: 457.1  11/27/2021 Unknown        BPH (benign prostatic hyperplasia) ICD-10-CM: N40.0  ICD-9-CM: 600.00  11/27/2021 Yes        Elevated troponin ICD-10-CM: R77.8  ICD-9-CM: 790.6  11/27/2021 Yes        History of DVT (deep vein thrombosis) ICD-10-CM: J77.186  ICD-9-CM: V12.51  11/27/2021 Unknown        * (Principal) Acute hypoxemic respiratory failure due to COVID-19 [facility]) ICD-10-CM: U07.1, J96.01  ICD-9-CM: 518.81, 079.89, 799.02  11/26/2021 Yes        Lactic acidosis ICD-10-CM: E87.2  ICD-9-CM: 276.2  8/15/2020 Yes        Hyperlipemia (Chronic) ICD-10-CM: E78.5  ICD-9-CM: 272.4  3/12/2009 Yes        HTN (hypertension) (Chronic) ICD-10-CM: I10  ICD-9-CM: 401.9  3/12/2009 Yes        Type 2 diabetes mellitus with hyperglycemia, with long-term current use of insulin (HCC) ICD-10-CM: E11.65, Z79.4  ICD-9-CM: 250.00, 790.29, V58.67  3/12/1999 Yes

## 2021-12-06 ENCOUNTER — PATIENT OUTREACH (OUTPATIENT)
Dept: CASE MANAGEMENT | Age: 61
End: 2021-12-06

## 2021-12-06 NOTE — PROGRESS NOTES
Patient contacted regarding COVID-19 diagnosis. Discussed COVID-19 related testing which was available at this time. Test results were positive. Patient informed of results, if available? Patient hospitalized with COVID/PNA. Care Transition Nurse contacted the patient by telephone to perform post discharge assessment. Call within 2 business days of discharge: Yes Verified name and  with patient as identifiers. Provided introduction to self, and explanation of the CTN/ACM role, and reason for call due to risk factors for infection and/or exposure to COVID-19. Symptoms reviewed with patient who verbalized the following symptoms: no new symptoms and no worsening symptoms      Due to no new or worsening symptoms encounter was not routed to provider for escalation. Discussed follow-up appointments. If no appointment was previously scheduled, appointment scheduling offered:  yes. [facility] follow up appointment(s):   Future Appointments   Date Time Provider Brittaney Heredia   2021  9:40 AM Ailyn Garcia MD IFP [facility]     Non-[facility] follow up appointment(s):   Dr. Luciano Valenzuela (patient will call if not contacted by afternoon)  Home oxygen 3L-[employer] resp. Interventions to address risk factors: Scheduled appointment with PCP- VV with , Scheduled appointment with Specialist-Dr. Yazmin Mcgee for elevated BG levels and Obtained and reviewed discharge summary and/or continuity of care documents     Advance Care Planning:   Does patient have an Advance Directive: not on file; education provided. Patient request form sent by mail. Primary Decision Maker: Jose De Jesus Sahu Spouse - [phone]    Educated patient about risk for severe COVID-19 due to risk factors according to CDC guidelines. CTN reviewed discharge instructions, medical action plan and red flag symptoms with the patient who verbalized understanding.  Discussed COVID vaccination status: no. Education provided on COVID-19 vaccination as appropriate. Discussed exposure protocols and quarantine with CDC Guidelines. Patient was given an opportunity to verbalize any questions and concerns and agrees to contact CTN or health care provider for questions related to their healthcare. Reviewed and educated patient on any new and changed medications related to discharge diagnosis. Patient has not picked up tessalon from his pharmacy. He has mild cough. Was patient discharged with a pulse oximeter? No  Patient has pulse ox available at home. Goals      Prevent complications post hospitalization. 12/06/21   Attend follow up as directed-  [facility]):   Future Appointments   Date Time Provider Brittaney Heredia   12/9/2021  9:40 AM Anne Gaffney MD IFP BS [facility]     Non-BS follow up appointment(s):   Dr. Brittany Olvera (patient will call if not contacted by afternoon)  Home oxygen 3L-[employer] resp.  Home oxygen-O2 protocols (ie fall risk with tubing, no smoking, oxygen in use sign), pulse ox to monitor oxygen sats. Patient has pulse oximeter at home and reports on O2 at 3L this am 95%. He has IS available and is encouraged to use. He verbalizes understanding of purse lip breathing technique.  Activity- activity intolerance/energy conservation/frequent rest. HH PT/OT to increase mobility and prevent falls.  Diet- he is eating and drinking without issues. He has not checked his BG levels since discharging home. He is encouraged to contact Dr. Bea Zuluaga to discuss elevated A1C-7.9   Patient will monitor/report red flags- increased SOB, wheezing, worsening cough, excess phlegm/mucus, increased fatigue, CP, fever greater than 101 that does not respond to fever reducers, N/V/D-s/s dehydration/reduced po intake. CTN provided contact information. Plan for follow-up call in 5-7 days based on severity of symptoms and risk factors.

## 2021-12-07 NOTE — ADT AUTH CERT NOTES
Utilization Reviews         Viral Illness, Acute - Care Day 9 (12/4/2021) by Oziel Peña RN       Review Status Review Entered   Completed 12/7/2021 13:51      Criteria Review      Care Day: 9 Care Date: 12/4/2021 Level of Care: Telemetry    Guideline Day 3    Clinical Status    (X) * Hemodynamic stability    12/7/2021 13:51:54 EST by Oziel Peña      97.5;66;149/75;18;95% NC 2 l/min    (X) * Afebrile or temperature acceptable for next level of care    12/7/2021 13:51:54 EST by Oziel Peña      97.5    (X) * Tachypnea absent    12/7/2021 13:51:54 EST by Oziel Peña      18    (X) * Hypoxemia absent    (X) * Mental status at baseline    12/7/2021 13:51:54 EST by Oziel Peña      A&O    ( ) * Renal function at baseline, or stable and acceptable for next level of care    ( ) * Discharge plans and education understood    Activity    (X) * Ambulatory or acceptable for next level of care    12/7/2021 13:51:54 EST by Keon Muniz with assistance as needed    Routes    (X) * Oral hydration    12/7/2021 13:51:54 EST by Oziel Peña      PO diabetic    (X) * Oral medications or regimen acceptable for next level of care    12/7/2021 13:51:54 EST by Oziel Peña      zinc sulfate (ZINCATE) 50 mg zinc (220 mg) capsule 1 Capsule  Dose: 1 Capsule  Freq: DAILY Route: PO    tamsulosin (FLOMAX) capsule 0.4 mg  Dose: 0.4 mg  Freq: DAILY Route: PO    (X) * Oral diet or acceptable for next level of care    12/7/2021 13:51:54 EST by Oziel Peña      PO diabetic diet    Interventions    ( ) * Isolation not indicated, or is performable at next level of care    12/7/2021 13:51:54 EST by radha Shaffer    (X) Pulse oximetry    12/7/2021 13:51:54 EST by Oziel Peña      routine    Medications    ( ) * Antimicrobial medication absent or regimen established for next level of care    * Milestone   Additional Notes   12/4/21   Telemetry in pt      Medications   apixaban (ELIQUIS) tablet 5 mg   Dose: 5 mg Freq: 2 TIMES DAILY Route: PO      ascorbic acid (vitamin C) (VITAMIN C) tablet 500 mg   Dose: 500 mg   Freq: 2 TIMES DAILY Route: PO      atorvastatin (LIPITOR) tablet 40 mg   Dose: 40 mg   Freq: DAILY Route: PO      baricitinib (OLUMIANT) tablet 4 mg   Dose: 4 mg   Freq: DAILY Route: PO      dexamethasone (DECADRON) 4 mg/mL injection 6 mg   Dose: 6 mg   Freq: EVERY 24 HOURS Route: IV      dilTIAZem ER (CARDIZEM CD) capsule 240 mg   Dose: 240 mg   Freq: DAILY Route: PO      famotidine (PEPCID) tablet 20 mg   Dose: 20 mg   Freq: 2 TIMES DAILY Route: PO      insulin NPH (NOVOLIN N, HUMULIN N) injection 80 Units   Dose: 90 Units   Freq: DAILY Route: SC      furosemide (LASIX) injection 20 mg   Dose: 20 mg   Freq: DAILY Route: IV      hydroCHLOROthiazide (HYDRODIURIL) tablet 25 mg   Dose: 25 mg   Freq: DAILY Route: PO      insulin glargine (LANTUS) injection 50 Units   Dose: 50 Units   Freq: DAILY Route: SC      insulin lispro (HUMALOG) injection 60 Units   Dose: 60 Units   Freq: 3 TIMES DAILY WITH MEALS Route: SC      polyethylene glycol (MIRALAX) packet 17 g   Dose: 17 g   Freq: DAILY Route: PO      Vitals   97.5;66;149/75;18;95% NC 2 l/min      Labs      WBC 11.7 (H)   RDW 14.3   PLATELET 089   NEUTROPHILS 78 (H)   LYMPHOCYTES 9 (L)   IMMATURE GRANULOCYTES 0   ABS. NEUTROPHILS 9.2 (H)   ABS. IMM. GRANS. 0.0   ABS. LYMPHOCYTES 1.1   ABS. MONOCYTES 1.4 (H)      Sodium 135 (L)   Potassium 3.6   Glucose 283 (H)   BUN 25 (H)   BUN/Creatinine ratio 24 (H)   Ferritin- 408   LD-293      Attending   Assessment and Plan       Monik Singleton is a 64 y.o. male with a PMH of HTN, DM2, HLD, chronic lymphedema, history of right femoral DVT in 2020 now on [facility] admitted for acute hypoxic respiratory failure 2/2 COVID PNA and lactic acidosis. Patient was admitted on 11/26/2021.       24-Hour Events: No acute events overnight.  BiPap worn.       Acute hypoxic respiratory failure 2/2 COVID pneumonia: Pt with O2 sat of 88% at time of admission. Symptom onset 11/18. Rapidly increasing O2 needs on 11/28, CTA neg for PE. Started on Baricitinib on 11/28.   - PT/OT consulted    - Decadron to 6mg daily, due to uncontrolled BG. Last dose of 10 day course will be 12/5/21   - BiPap overnight    - Continue Baricitinib 4 mg daily (started 11/28)   - Lasix IV 20mg daily   - Pulmonology following, appreciate further recommendations   - COVID meds (Atorvastatin 40mg every day, Vitamin C 500mg BID, and Zinc 220mg every day)   - CBC with diff daily, COVID labs (CRP, D-dimer, ferritin, LD) daily, currently downtrending       T2DM: Last A1c 7.9% in 09/2020. On Lantus 100u QHS, SSI Lispro, Metformin 1000mg BID at home. - Endocrinology consulted, appreciate recs. - Lantus 100u daily    - Lispro 60 units TID w/ meals   - NPH 90u linked with Dexamethasone   - SSI / ACHS, Hypoglycemia protocols ordered       History of DVT: On Eliquis at home. F/b Dr. Bran Royal etiology - not provoked per Dr. Bernadine Goode note. Per chart review, pt started on Xarelto, however pt reportedly taking Eliquis per medication reconciliation with wife over phone.   - Continue Eliquis 5mg BID   - OOB as tolerated       Hypokalemia: POA 3.1. Wnl.   - BMP and Mg daily   - Replete as needed   - Patient on remote tele       AIDEE: Recommend BiPAP at night.        HLD: Last lipid panel 08/2020 with , HDL 43, LDL 99, . On Rosuvastatin 20mg daily at home. - Will substitute with Atorvastatin 40mg daily        HTN: POA /68. On Metoprolol XL 50mg daily, Diltiazem 240mg daily, and HCTZ 25mg daily. - Will continue BP meds   - Continue IV Lasix 20mg daily   - Monitor and adjust as required       Chronic lymphedema: Chronic venous stasis and 1+ pitting edema noted on exam. Pt states he uses compression stockings at home. - Compression stockings ordered   - Lasix as above   - Elevate lower extremities       BPH: Chronic, stable.     - Continue Alfuzosin 10mg daily - pharm to substitute       Obesity:   - The pt's Body mass index is 48.15 kg/m². - Encouraging lifestyle modifications and further follow up outpatient.       Dysuria: Noted per pt. Likely 2/2 decreased UOP in setting of poor PO intake. UA now without evidence of infection.   - Continue to monitor VS, labs, and symptoms   - Bladder checks and straight cath PRN       Lactic acidosis: DOWNTRENDED. POA 3.0 -> 2.4 -> 2.2 -> 2.5 -> 3.0 -> 3.2 -> 2.8 -> 2.9 -> 2.1. Concern for PE given, O2 requirement. - Stopped trending    - BMP daily       Troponinemia (resolved): Troponin of 15 -> 22 -> 15. Pt without chest pain or tightness. Low concern for ACS. Per chart review, last [facility] 2007 was wnl.   - Will continue to monitor symptoms, electrolytes   - Patient on remote cardiac monitoring           FEN/GI - Diabetic diet. Activity - Out of bed with assistance   DVT prophylaxis - Eliquis   GI prophylaxis - Pepcid 20 BID   Fall prophylaxis - Fall precautions ordered. Disposition - Admit to Remote Telemetry. PT recommends 2 days/week home PT   Code Status - Partial (DNI). Discussed with patient / caregivers.    Next of Katarina 69 Name and Ashlie Escobarzachary- [phone]              Viral Illness, Acute - Care Day 8 (12/3/2021) by Elijah Patel, RN       Review Status Review Entered   Completed 12/7/2021 13:44      Criteria Review      Care Day: 8 Care Date: 12/3/2021 Level of Care: Telemetry    Guideline Day 3    Clinical Status    (X) * Hemodynamic stability    12/7/2021 13:44:17 EST by Elijah Patel      97.5;75;154/78;18; 94% NC 4 l/min    (X) * Afebrile or temperature acceptable for next level of care    12/7/2021 13:44:17 EST by Elijah Patel      97.5    (X) * Tachypnea absent    12/7/2021 13:44:17 EST by Elijah Patel      18    (X) * Hypoxemia absent    12/7/2021 13:44:17 EST by Elijah Patel      94% NC 4 l/min    (X) * Mental status at baseline    12/7/2021 13:44:17 EST by Elijah GIRON&BLAKE    ( ) * Renal function at baseline, or stable and acceptable for next level of care    ( ) * Discharge plans and education understood    Activity    (X) * Ambulatory or acceptable for next level of care    12/7/2021 13:44:17 EST by Brayan Huizar      up with assistance as needed    Routes    (X) * Oral hydration    12/7/2021 13:44:17 EST by Brayan Huizar      po diabetic    (X) * Oral medications or regimen acceptable for next level of care    (X) * Oral diet or acceptable for next level of care    Interventions    ( ) * Isolation not indicated, or is performable at next level of care    12/7/2021 13:44:17 EST by Brayan Huizar      droplet, covid    (X) Pulse oximetry    12/7/2021 13:44:17 EST by Brayan Huizar      routine    Medications    ( ) * Antimicrobial medication absent or regimen established for next level of care    * Milestone   Additional Notes   12/3/21   Telemetry in pt      Medications   apixaban (ELIQUIS) tablet 5 mg   Dose: 5 mg   Freq: 2 TIMES DAILY Route: PO      ascorbic acid (vitamin C) (VITAMIN C) tablet 500 mg   Dose: 500 mg   Freq: 2 TIMES DAILY Route: PO      atorvastatin (LIPITOR) tablet 40 mg   Dose: 40 mg   Freq: DAILY Route: PO      baricitinib (OLUMIANT) tablet 4 mg   Dose: 4 mg   Freq: DAILY Route: PO      dexamethasone (DECADRON) 4 mg/mL injection 6 mg   Dose: 6 mg   Freq: EVERY 24 HOURS Route: IV      dilTIAZem ER (CARDIZEM CD) capsule 240 mg   Dose: 240 mg   Freq: DAILY Route: PO      famotidine (PEPCID) tablet 20 mg   Dose: 20 mg   Freq: 2 TIMES DAILY Route: PO      insulin NPH (NOVOLIN N, HUMULIN N) injection 80 Units   Dose: 80 Units   Freq: DAILY Route: SC      furosemide (LASIX) injection 20 mg   Dose: 20 mg   Freq: DAILY Route: IV      hydroCHLOROthiazide (HYDRODIURIL) tablet 25 mg   Dose: 25 mg   Freq: DAILY Route: PO      insulin glargine (LANTUS) injection 50 Units   Dose: 50 Units   Freq: DAILY Route: SC      insulin lispro (HUMALOG) injection   Freq: 4 TIMES DAILY WITH MEALS Route: SC X 4 according to sliding scale      polyethylene glycol (MIRALAX) packet 17 g   Dose: 17 g   Freq: DAILY Route: PO      zinc sulfate (ZINCATE) 50 mg zinc (220 mg) capsule 1 Capsule   Dose: 1 Capsule   Freq: DAILY Route: PO      tamsulosin (FLOMAX) capsule 0.4 mg   Dose: 0.4 mg   Freq: DAILY Route: PO      Vitals   97.5;75;154/78;18; 94% NC 4 l/min      Labs      Sodium 135 (L)   Potassium 3.5   Glucose 239 (H)   BUN 26 (H)   BUN/Creatinine ratio 25 (H)       (H)   Ferritin 477 (H)   C-Reactive protein <0.29   Glucose 239 (H)      Attending   Assessment and Plan       Ivy Hui is a 64 y.o. male with a PMH of HTN, DM2, HLD, chronic lymphedema, history of right femoral DVT in 2020 now on [facility] admitted for acute hypoxic respiratory failure 2/2 COVID PNA and lactic acidosis. Patient was admitted on 11/26/2021.       24-Hour Events: No acute events overnight. Wore BiPap approx 5 hours and did well.        Acute hypoxic respiratory failure 2/2 COVID pneumonia: Pt with O2 sat of 88% at time of admission. Symptom onset 11/18. Rapidly increasing O2 needs on 11/28, CTA neg for PE. Started on Baricitinib on 11/28. - Currently on O2 7L, sats 90%   - PT/OT consulted    - Decadron to 6mg daily, due to uncontrolled BG. - BiPap overnight    - Continue Baricitinib 4 mg daily (started 11/28)   - Lasix IV 20mg daily   - Pulmonology following, appreciate further recommendations   - COVID meds (Atorvastatin 40mg every day, Vitamin C 500mg BID, and Zinc 220mg every day)   - CBC with diff daily, COVID labs (CRP, D-dimer, ferritin, LD) daily, currently downtrending       T2DM: Last A1c 7.9% in 09/2020. On Lantus 100u QHS, SSI Lispro, Metformin 1000mg BID at home. - Endocrinology consulted, appreciate recs. - Lantus 100u daily    - Lispro 50 units TID w/ meals   - NPH 80u linked with Dexamethasone   - SSI / ACHS, Hypoglycemia protocols ordered       History of DVT: On Eliquis at home.  F/b Dr. Phyllis Mancia etiology - not provoked per Dr. Yessenia Escobar note. Per chart review, pt started on Xarelto, however pt reportedly taking Eliquis per medication reconciliation with wife over phone.   - Continue Eliquis 5mg BID   - OOB as tolerated       Hypokalemia: POA 3.1. Wnl.   - BMP and Mg daily   - Replete as needed   - Patient on remote tele       AIDEE: Recommend BiPAP at night.        HLD: Last lipid panel 08/2020 with , HDL 43, LDL 99, . On Rosuvastatin 20mg daily at home. - Will substitute with Atorvastatin 40mg daily        HTN: POA /68. On Metoprolol XL 50mg daily, Diltiazem 240mg daily, and HCTZ 25mg daily. - Will continue BP meds   - Continue IV Lasix 20mg daily   - Monitor and adjust as required       Chronic lymphedema: Chronic venous stasis and 3+ pitting edema noted on exam. Pt states he uses compression stockings at home. - Compression stockings ordered   - Lasix as above   - Elevate lower extremities       BPH: Chronic, stable. - Continue Alfuzosin 10mg daily - pharm to substitute       Obesity:   - The pt's Body mass index is 48.15 kg/m². - Encouraging lifestyle modifications and further follow up outpatient.       Dysuria: Noted per pt. Likely 2/2 decreased UOP in setting of poor PO intake. UA now without evidence of infection.   - Continue to monitor VS, labs, and symptoms   - Bladder checks and straight cath PRN       Lactic acidosis: DOWNTRENDED. POA 3.0 -> 2.4 -> 2.2 -> 2.5 -> 3.0 -> 3.2 -> 2.8 -> 2.9 -> 2.1. Concern for PE given, O2 requirement. - Stopped trending    - BMP daily       Troponinemia (resolved): Troponin of 15 -> 22 -> 15. Pt without chest pain or tightness. Low concern for ACS. Per chart review, last [facility] 2007 was wnl.   - Will continue to monitor symptoms, electrolytes   - Patient on remote cardiac monitoring           FEN/GI - Diabetic diet.    Activity - Out of bed with assistance   DVT prophylaxis - Eliquis   GI prophylaxis - Pepcid 20 BID   Fall prophylaxis - Fall precautions ordered. Disposition - Admit to Remote Telemetry. PT recommends 2 days/week home PT   Code Status - Partial (DNI). Discussed with patient / caregivers.    Next Maryam Lloyd Name and Kim Ochoa- [phone]      Pulmonary   Impression Plan   Acute hypoxemic respiratory failure   COVID 19 PNA   Morbid obesity   Hx of DVT                             Wean O2 to keep sats above 90%   Continue Baricitinib   Dexamethasone 6mg daily   OOB into chair   Self prone at night for 3 hrs if possible--he declines   Follow d-dimer   Follow inflammatory marker   Continue eliquis   Encourage IS use   Exercise oximetry prior to discharge                 Viral Illness, Acute - Care Day 7 (12/2/2021) by Latesha Layton, RN       Review Status Review Entered   Completed 12/7/2021 13:12      Criteria Review      Care Day: 7 Care Date: 12/2/2021 Level of Care: Telemetry    Guideline Day 3    Clinical Status    (X) * Hemodynamic stability    12/7/2021 13:12:57 EST by Latesha Layton      97.7;61;21;94%; 160/81    (X) * Afebrile or temperature acceptable for next level of care    12/7/2021 13:12:57 EST by Latesha Layton      97.7;61;21;94%; 160/81    ( ) * Tachypnea absent    12/7/2021 13:12:57 EST by Latesha Layton      97.7;61;21;94%; 160/81    (X) * Hypoxemia absent    12/7/2021 13:12:57 EST by Latesha Layton      97.7;61;21;94%; 160/81    (X) * Mental status at baseline    12/7/2021 13:12:57 EST by Latesha Layton      A&O    ( ) * Renal function at baseline, or stable and acceptable for next level of care    ( ) * Discharge plans and education understood    Activity    (X) * Ambulatory or acceptable for next level of care    12/7/2021 13:12:57 EST by Latesha Layton      with assistance as needed    Routes    (X) * Oral hydration    12/7/2021 13:12:57 EST by Latesha Layton      PO diabetic    (X) * Oral medications or regimen acceptable for next level of care    12/7/2021 13:12:57 EST by Latesha Layton      dilTIAZem ER (CARDIZEM CD) capsule 240 mg  Dose: 240 mg  Freq: DAILY Route: PO    famotidine (PEPCID) tablet 20 mg  Dose: 20 mg  Freq: 2 TIMES DAILY Route: PO    (X) * Oral diet or acceptable for next level of care    12/7/2021 13:12:57 EST by Hanna Salazar      PO diabetic    Interventions    ( ) * Isolation not indicated, or is performable at next level of care    12/7/2021 13:12:57 EST by Hanna Salazar      droplet, COVID    (X) Pulse oximetry    12/7/2021 13:12:57 EST by Hanna Salazar      routine    Medications    ( ) * Antimicrobial medication absent or regimen established for next level of care    * Milestone   Additional Notes   12/2/21   Step down in pt      Medications   apixaban (ELIQUIS) tablet 5 mg   Dose: 5 mg   Freq: 2 TIMES DAILY Route: PO      ascorbic acid (vitamin C) (VITAMIN C) tablet 500 mg   Dose: 500 mg   Freq: 2 TIMES DAILY Route: PO      atorvastatin (LIPITOR) tablet 40 mg   Dose: 40 mg   Freq: DAILY Route: PO      baricitinib (OLUMIANT) tablet 4 mg   Dose: 4 mg   Freq: DAILY Route: PO      dexamethasone (DECADRON) 4 mg/mL injection 6 mg   Dose: 6 mg   Freq: EVERY 24 HOURS Route: IV      insulin NPH (NOVOLIN N, HUMULIN N) injection 60 Units   Dose: 60 Units   Freq: DAILY Route: SC      furosemide (LASIX) injection 20 mg   Dose: 20 mg   Freq: DAILY Route: IV      hydroCHLOROthiazide (HYDRODIURIL) tablet 25 mg   Dose: 25 mg   Freq: DAILY Route: PO      insulin glargine (LANTUS) injection 50 Units   Dose: 50 Units   Freq: DAILY Route: SC      insulin lispro (HUMALOG) injection   Freq: 4 TIMES DAILY WITH MEALS Route: SC X 3 according to sliding scale      polyethylene glycol (MIRALAX) packet 17 g   Dose: 17 g   Freq: DAILY Route: PO      zinc sulfate (ZINCATE) 50 mg zinc (220 mg) capsule 1 Capsule   Dose: 1 Capsule   Freq: DAILY Route: PO      tamsulosin (FLOMAX) capsule 0.4 mg   Dose: 0.4 mg   Freq: DAILY Route: PO      Vitals   97.7;61;21;94%; 160/81      Labs       NEUTROPHILS 86 (H)   LYMPHOCYTES 6 (L)   IMMATURE GRANULOCYTES 0   ABS. NEUTROPHILS 8.6 (H)   ABS. IMM. GRANS. 0.0   ABS. LYMPHOCYTES 0.6 (L)      Sodium 134 (L)   Potassium 3.7   Glucose 311 (H)   BUN 28 (H)   BUN/Creatinine ratio 25 (H)   LD- 18   Ferritin-488      Attending          Assessment and Plan       Talya Garcia is a 64 y.o. male with a PMH of HTN, DM2, HLD, chronic lymphedema, history of right femoral DVT in 2020 now on [facility] admitted for acute hypoxic respiratory failure 2/2 COVID PNA and lactic acidosis. Patient was admitted on 11/26/2021.       24-Hour Events: No acute events overnight.        Acute hypoxic respiratory failure 2/2 COVID pneumonia: Pt with O2 sat of 88% at time of admission. Symptom onset 11/18. Presenting symptoms acutely worsened on date of admission, prompting presentation to the ED. In the ED, given Decadron IV 10mg, 1L NS, with symptomatic improvement. Pt met 1/4 SIRS criteria on admission, and CURB-65 1/5. Rapidly increasing O2 needs on 11/28 - prompting CTA chest - negative for PE. Started on Baricitinib on 11/28.   - Continue supplemental O2, wean as tolerated   - PT/OT consulted    - Decrease decadron to 6mg daily, due to uncontrolled BG.    - Discuss with RT for CPAP vs BiPap overnight    - Continue Baricitinib 4 mg daily (started 11/28)   - Lasix IV 20mg daily   - Pulmonology following, appreciate further recommendations   - COVID meds (Atorvastatin 40mg every day, Vitamin C 500mg BID, and Zinc 220mg every day)   - CBC with diff daily, COVID labs (CRP, D-dimer, ferritin, LD) daily       T2DM: Last A1c 7.9% in 09/2020. On Lantus 100u QHS, SSI Lispro, Metformin 1000mg BID at home. - Endocrinology consulted, appreciate recs. Recommending no change in current insulin regimen over to watch BG over the next day. - Lantus 100u daily    - Lispro 40 units TID w/ meals   - NPH 60u linked with Dexamethasone   - SSI   - POC BG ACHS   - Hypoglycemia protocols ordered       History of DVT: On Eliquis at home.  F/b  Adis. Unclear etiology - not provoked per Dr. Heike Valle note. Denies fh/o VTE that pt is aware of. Per chart review, pt started on Xarelto, however pt reportedly taking Eliquis per medication reconciliation with wife over phone.   - Continue Eliquis 5mg BID   - Up and out of bed as tolerated       Hypokalemia: POA 3.1. Wnl.   - BMP and Mg daily   - Replete as needed   - Patient on remote tele       AIDEE: Recommend BiPAP at night.        HLD: Last lipid panel 08/2020 with , HDL 43, LDL 99, . On Rosuvastatin 20mg daily at home. - Will substitute with Atorvastatin 40mg daily here       HTN: POA /68. On Metoprolol XL 50mg daily, Diltiazem 240mg daily, and HCTZ 25mg daily. - Will continue BP meds   - Continue IV Lasix 20mg daily   - Monitor and adjust as required       Constipation: Patient states no bowel movement in over a week. - KUB for possible obstruction   - Scheduled bowel regimen after KUB       Chronic lymphedema: Chronic venous stasis and 3+ pitting edema noted on exam. Pt states he uses compression stockings at home. - Compression stockings ordered   - Lasix as above   - Elevate lower extremities       BPH: Chronic, stable. - Continue Alfuzosin 10mg daily - pharm to substitute       Obesity:   - The pt's Body mass index is 48.15 kg/m². - Encouraging lifestyle modifications and further follow up outpatient.       Dysuria: Noted per pt. Likely 2/2 decreased UOP in setting of poor PO intake. UA now without evidence of infection.   - Continue to monitor VS, labs, and symptoms   - Bladder checks and straight cath PRN       Lactic acidosis: DOWNTRENDED. POA 3.0 -> 2.4 -> 2.2 -> 2.5 -> 3.0 -> 3.2 -> 2.8 -> 2.9 -> 2.1. Concern for PE given, O2 requirement. - Stopped trending    - BMP daily       Troponinemia (resolved): Troponin of 15 -> 22 -> 15. Pt without chest pain or tightness. Low concern for ACS.  Per chart review, last [facility] 2007 was wnl.   - Will continue to monitor symptoms, electrolytes   - Patient on remote cardiac monitoring           FEN/GI - Diabetic diet. Activity - Out of bed with assistance   DVT prophylaxis - Eliquis   GI prophylaxis - Pepcid 20 BID   Fall prophylaxis - Fall precautions ordered. Disposition - Admit to Remote Telemetry. PT recommends 2 days/week home PT   Code Status - Partial (DNI). Discussed with patient / caregivers. Next of Katarina 69 Name and Caryle Stacey- [phone]       Pulmonary   Impression Plan   Acute hypoxemic respiratory failure   COVID 19 PNA   Morbid obesity   Hx of DVT                             Wean O2 to keep sats above 90%; may be able to transition to midflow today   Continue Baricitinib   Dexamethasone weaned to 6mg daily   OOB into chair   Self prone at night for 3 hrs if possible   Follow d-dimer   Follow inflammatory marker   Continue eliquis   Encourage IS use      Endocrinology   Impression   1.  Covid pneumonia on high flow O2 and per protocol   2.  Type 2 diabetes mellitus with marked elevations in blood sugar exacerbated by glucocorticoids,        Recommendation   1.  I would give an additional 20 units of NPH now and increase the NPH dose to 80 units linked with steroids   2.  Continue Lantus 100 units daily   3.  Continue the 40 units of mealtime insulin (essentially 1 unit for every 1 g of carbohydrate)   4.  We continue to follow with you

## 2021-12-09 ENCOUNTER — VIRTUAL VISIT (OUTPATIENT)
Dept: FAMILY MEDICINE CLINIC | Age: 61
End: 2021-12-09
Payer: COMMERCIAL

## 2021-12-09 DIAGNOSIS — R65.20 SEVERE SEPSIS (HCC): ICD-10-CM

## 2021-12-09 DIAGNOSIS — R65.10 SIRS (SYSTEMIC INFLAMMATORY RESPONSE SYNDROME) (HCC): ICD-10-CM

## 2021-12-09 DIAGNOSIS — J96.01 ACUTE HYPOXEMIC RESPIRATORY FAILURE DUE TO COVID-19 (HCC): ICD-10-CM

## 2021-12-09 DIAGNOSIS — I10 PRIMARY HYPERTENSION: ICD-10-CM

## 2021-12-09 DIAGNOSIS — U07.1 ACUTE HYPOXEMIC RESPIRATORY FAILURE DUE TO COVID-19 (HCC): ICD-10-CM

## 2021-12-09 DIAGNOSIS — A41.9 SEVERE SEPSIS (HCC): ICD-10-CM

## 2021-12-09 DIAGNOSIS — Z79.4 TYPE 2 DIABETES MELLITUS WITH HYPERGLYCEMIA, WITH LONG-TERM CURRENT USE OF INSULIN (HCC): ICD-10-CM

## 2021-12-09 DIAGNOSIS — U07.1 COVID-19: Primary | ICD-10-CM

## 2021-12-09 DIAGNOSIS — E11.65 TYPE 2 DIABETES MELLITUS WITH HYPERGLYCEMIA, WITH LONG-TERM CURRENT USE OF INSULIN (HCC): ICD-10-CM

## 2021-12-09 PROCEDURE — 99214 OFFICE O/P EST MOD 30 MIN: CPT | Performed by: FAMILY MEDICINE

## 2021-12-09 PROCEDURE — 3052F HG A1C>EQUAL 8.0%<EQUAL 9.0%: CPT | Performed by: FAMILY MEDICINE

## 2021-12-09 NOTE — PROGRESS NOTES
Consent: Annette Perez, who was seen by synchronous (real-time) audio-video technology, and/or his healthcare decision maker, is aware that this patient-initiated, Telehealth encounter on 12/9/2021 is a billable service, with coverage as determined by his insurance carrier. He is aware that he may receive a bill and has provided verbal consent to proceed: YES-Consent obtained within past 12 months  712    Prior to Admission medications    Medication Sig Start Date End Date Taking? Authorizing Provider   benzonatate (TESSALON) 200 mg capsule Take 1 Capsule by mouth three (3) times daily as needed for Cough for up to 5 days. 12/5/21 12/10/21  Joe Ortiz MD   insulin glargine U-300 conc (Toujeo Max U-300 SoloStar) 300 unit/mL (3 mL) inpn 100 Units by SubCUTAneous route nightly. Provider, Historical   apixaban (ELIQUIS) 5 mg tablet Take 5 mg by mouth two (2) times a day. Indications: blood clot in a deep vein of the extremities    Provider, Historical   ondansetron (ZOFRAN ODT) 4 mg disintegrating tablet Take 1 Tablet by mouth every eight (8) hours as needed for Nausea or Vomiting. Patient not taking: Reported on 11/26/2021 11/23/21   Domo PINEDA, DO   triamcinolone acetonide (KENALOG) 0.1 % topical cream Apply  to affected area three (3) times daily as needed for Skin Irritation. use thin layer 8/6/21   Shamir Lockett H, DO   metoprolol succinate (TOPROL-XL) 50 mg XL tablet Take 50 mg by mouth daily. Provider, Historical   insulin lispro (HUMALOG) 100 unit/mL kwikpen by SubCUTAneous route Before breakfast, lunch, and dinner. Sliding scale    Provider, Historical   dilTIAZem ER (CARDIZEM LA) 240 mg Tb24 tablet Take 240 mg by mouth daily. Provider, Historical   hydroCHLOROthiazide (HYDRODIURIL) 25 mg tablet Take 25 mg by mouth daily. Provider, Historical   rosuvastatin (CRESTOR) 20 mg tablet Take 20 mg by mouth nightly.     Provider, Historical   metFORMIN (GLUCOPHAGE) 1,000 mg tablet Take 1,000 mg by mouth two (2) times daily (with meals). Provider, Historical     No Known Allergies        Assessment & Plan:   Diagnoses and all orders for this visit:    1. COVID-19  Patient is status post discharge from hospital with COVID-19. Is now home on O2. Physical is doing much better. Continue with current plan to follow-up with pulmonary as planned  2. Acute hypoxemic respiratory failure due to COVID-19 [facility])  See above  3. Severe sepsis ([address]  See above  4. SIRS (systemic inflammatory response syndrome) (HCC)  See above  5. Type 2 diabetes mellitus with hyperglycemia, with long-term current use of insulin ([facility])  Lab Results   Component Value Date/Time    Hemoglobin A1c 8.1 (H) 11/28/2021 07:58 AM    Hemoglobin A1c, External 7.9 09/02/2021 12:00 AM       6. Primary hypertension  Hold home      Medication Side Effects and Warnings were discussed with patient  Patient Labs were reviewed and or requested:  Patient Past Records were reviewed and or requested              We discussed the expected course, resolution and complications of the diagnosis(es) in detail. Medication risks, benefits, costs, interactions, and alternatives were discussed as indicated. I advised him to contact the office if his condition worsens, changes or fails to improve as anticipated. He expressed understanding with the diagnosis(es) and plan. Al Christiansen is a 64 y.o. male being evaluated by a video visit encounter for concerns as above. A caregiver was present when appropriate. Due to this being a TeleHealth encounter (During [facility]- public health emergency), evaluation of the following organ systems was limited: Vitals/Constitutional/EENT/Resp/CV/GI//MS/Neuro/Skin/Heme-Lymph-Imm.   Pursuant to the emergency declaration under the [facility] waiver authority and the [employer] Act, this Virtual  Visit was conducted, with patient's (and/or legal guardian's) consent, to reduce the patient's risk of exposure to COVID-19 and provide necessary medical care. Services were provided through a video synchronous discussion virtually to substitute for in-person clinic visit. Patient and provider were located at their individual homes. I have discussed the diagnosis with the patient and the intended plan as seen in the above orders. The patient understands and agrees with the plan. The patient has received an after-visit summary and questions were answered concerning future plans. Medication Side Effects and Warnings were discussed with patient  Patient Labs were reviewed and or requested:  Patient Past Records were reviewed and or requested    Nitin Martinez M.D. There are no Patient Instructions on file for this visit.

## 2021-12-14 ENCOUNTER — PATIENT OUTREACH (OUTPATIENT)
Dept: CASE MANAGEMENT | Age: 61
End: 2021-12-14

## 2021-12-14 NOTE — PROGRESS NOTES
Patient contacted regarding COVID-19 diagnosis. Symptoms reviewed with patient who verbalized the following symptoms: no new symptoms and no worsening symptoms. Goals      Prevent complications post hospitalization. 12/06/21   Attend follow up as directed-  [facility] follow up appointment(s):   Future Appointments   Date Time Provider Brittaney Garcesi   12/9/2021  9:40 AM Jennifer Oliveira MD [facility]     Non-[facility] follow up appointment(s):   Dr. Breanna Garcia (patient will call if not contacted by afternoon)  Home oxygen 3L-[place] resp.  Home oxygen-O2 protocols (ie fall risk with tubing, no smoking, oxygen in use sign), pulse ox to monitor oxygen sats. Patient has pulse oximeter at home and reports on O2 at 3L this am 95%. He has IS available and is encouraged to use. He verbalizes understanding of purse lip breathing technique.  Activity- activity intolerance/energy conservation/frequent rest. HH PT/OT to increase mobility and prevent falls.  Diet- he is eating and drinking without issues. He has not checked his BG levels since discharging home. He is encouraged to contact Dr. Tata Braswell to discuss elevated A1C-7.9   Patient will monitor/report red flags- increased SOB, wheezing, worsening cough, excess phlegm/mucus, increased fatigue, CP, fever greater than 101 that does not respond to fever reducers, N/V/D-s/s dehydration/reduced po intake. 12/14/21  Patient attend PCP follow up. Continues to work with [facility] PT, has SN coming twice weekly. Feels like he is getting stronger every day. CTN provided contact information. No further follow-up call identified per patient.

## 2021-12-17 ENCOUNTER — TELEPHONE (OUTPATIENT)
Dept: FAMILY MEDICINE CLINIC | Age: 61
End: 2021-12-17

## 2021-12-17 NOTE — TELEPHONE ENCOUNTER
Mago Baeza from [facility] [phone] pt's wt today is 374lbs and ankles are swollen  2 to 3 times more please advise

## 2021-12-18 ENCOUNTER — HOSPITAL ENCOUNTER (EMERGENCY)
Age: 61
Discharge: HOME | End: 2021-12-18
Attending: EMERGENCY MEDICINE
Payer: COMMERCIAL

## 2021-12-18 ENCOUNTER — NURSE TRIAGE (OUTPATIENT)
Dept: OTHER | Facility: CLINIC | Age: 61
End: 2021-12-18

## 2021-12-18 ENCOUNTER — APPOINTMENT (OUTPATIENT)
Dept: GENERAL RADIOLOGY | Age: 61
End: 2021-12-18
Attending: EMERGENCY MEDICINE
Payer: COMMERCIAL

## 2021-12-18 ENCOUNTER — APPOINTMENT (OUTPATIENT)
Dept: VASCULAR SURGERY | Age: 61
End: 2021-12-18
Attending: EMERGENCY MEDICINE
Payer: COMMERCIAL

## 2021-12-18 VITALS
SYSTOLIC BLOOD PRESSURE: 127 MMHG | HEART RATE: 81 BPM | BODY MASS INDEX: 40.43 KG/M2 | HEIGHT: 74 IN | DIASTOLIC BLOOD PRESSURE: 63 MMHG | RESPIRATION RATE: 20 BRPM | WEIGHT: 315 LBS | OXYGEN SATURATION: 93 % | TEMPERATURE: 97.3 F

## 2021-12-18 DIAGNOSIS — R60.0 BILATERAL LOWER EXTREMITY EDEMA: ICD-10-CM

## 2021-12-18 DIAGNOSIS — I82.411 DEEP VEIN THROMBOSIS (DVT) OF FEMORAL VEIN OF RIGHT LOWER EXTREMITY, UNSPECIFIED CHRONICITY (HCC): Primary | ICD-10-CM

## 2021-12-18 LAB
ALBUMIN SERPL-MCNC: 2.8 G/DL (ref 3.5–5)
ALBUMIN/GLOB SERPL: 0.7 {RATIO} (ref 1.1–2.2)
ALP SERPL-CCNC: 65 U/L (ref 45–117)
ALT SERPL-CCNC: 52 U/L (ref 12–78)
ANION GAP SERPL CALC-SCNC: 7 MMOL/L (ref 5–15)
APPEARANCE UR: CLEAR
AST SERPL-CCNC: 24 U/L (ref 15–37)
BASOPHILS # BLD: 0 K/UL (ref 0–0.1)
BASOPHILS NFR BLD: 0 % (ref 0–1)
BILIRUB SERPL-MCNC: 1.1 MG/DL (ref 0.2–1)
BILIRUB UR QL: NEGATIVE
BNP SERPL-MCNC: 231 PG/ML
BUN SERPL-MCNC: 14 MG/DL (ref 6–20)
BUN/CREAT SERPL: 16 (ref 12–20)
CALCIUM SERPL-MCNC: 9 MG/DL (ref 8.5–10.1)
CHLORIDE SERPL-SCNC: 105 MMOL/L (ref 97–108)
CO2 SERPL-SCNC: 30 MMOL/L (ref 21–32)
COLOR UR: NORMAL
CREAT SERPL-MCNC: 0.87 MG/DL (ref 0.7–1.3)
DIFFERENTIAL METHOD BLD: ABNORMAL
EOSINOPHIL # BLD: 0.1 K/UL (ref 0–0.4)
EOSINOPHIL NFR BLD: 2 % (ref 0–7)
ERYTHROCYTE [DISTWIDTH] IN BLOOD BY AUTOMATED COUNT: 15.9 % (ref 11.5–14.5)
GLOBULIN SER CALC-MCNC: 4 G/DL (ref 2–4)
GLUCOSE SERPL-MCNC: 100 MG/DL (ref 65–100)
GLUCOSE UR STRIP.AUTO-MCNC: NEGATIVE MG/DL
HCT VFR BLD AUTO: 38.6 % (ref 36.6–50.3)
HGB BLD-MCNC: 12.6 G/DL (ref 12.1–17)
HGB UR QL STRIP: NEGATIVE
IMM GRANULOCYTES # BLD AUTO: 0 K/UL (ref 0–0.04)
IMM GRANULOCYTES NFR BLD AUTO: 0 % (ref 0–0.5)
KETONES UR QL STRIP.AUTO: NEGATIVE MG/DL
LEUKOCYTE ESTERASE UR QL STRIP.AUTO: NEGATIVE
LYMPHOCYTES # BLD: 2 K/UL (ref 0.8–3.5)
LYMPHOCYTES NFR BLD: 28 % (ref 12–49)
MAGNESIUM SERPL-MCNC: 2.1 MG/DL (ref 1.6–2.4)
MCH RBC QN AUTO: 29.6 PG (ref 26–34)
MCHC RBC AUTO-ENTMCNC: 32.6 G/DL (ref 30–36.5)
MCV RBC AUTO: 90.8 FL (ref 80–99)
MONOCYTES # BLD: 0.7 K/UL (ref 0–1)
MONOCYTES NFR BLD: 10 % (ref 5–13)
NEUTS SEG # BLD: 4.2 K/UL (ref 1.8–8)
NEUTS SEG NFR BLD: 60 % (ref 32–75)
NITRITE UR QL STRIP.AUTO: NEGATIVE
NRBC # BLD: 0 K/UL (ref 0–0.01)
NRBC BLD-RTO: 0 PER 100 WBC
PH UR STRIP: 7.5 [PH] (ref 5–8)
PLATELET # BLD AUTO: 146 K/UL (ref 150–400)
PMV BLD AUTO: 9.5 FL (ref 8.9–12.9)
POTASSIUM SERPL-SCNC: 3.8 MMOL/L (ref 3.5–5.1)
PROT SERPL-MCNC: 6.8 G/DL (ref 6.4–8.2)
PROT UR STRIP-MCNC: NEGATIVE MG/DL
RBC # BLD AUTO: 4.25 M/UL (ref 4.1–5.7)
SODIUM SERPL-SCNC: 142 MMOL/L (ref 136–145)
SP GR UR REFRACTOMETRY: 1.01 (ref 1–1.03)
TROPONIN-HIGH SENSITIVITY: 8 NG/L (ref 0–76)
UROBILINOGEN UR QL STRIP.AUTO: 0.2 EU/DL (ref 0.2–1)
WBC # BLD AUTO: 7.1 K/UL (ref 4.1–11.1)

## 2021-12-18 PROCEDURE — 99285 EMERGENCY DEPT VISIT HI MDM: CPT

## 2021-12-18 PROCEDURE — 83735 ASSAY OF MAGNESIUM: CPT

## 2021-12-18 PROCEDURE — 80053 COMPREHEN METABOLIC PANEL: CPT

## 2021-12-18 PROCEDURE — 84484 ASSAY OF TROPONIN QUANT: CPT

## 2021-12-18 PROCEDURE — 93970 EXTREMITY STUDY: CPT

## 2021-12-18 PROCEDURE — 71045 X-RAY EXAM CHEST 1 VIEW: CPT

## 2021-12-18 PROCEDURE — 83880 ASSAY OF NATRIURETIC PEPTIDE: CPT

## 2021-12-18 PROCEDURE — 93005 ELECTROCARDIOGRAM TRACING: CPT

## 2021-12-18 PROCEDURE — 85025 COMPLETE CBC W/AUTO DIFF WBC: CPT

## 2021-12-18 PROCEDURE — 81003 URINALYSIS AUTO W/O SCOPE: CPT

## 2021-12-18 PROCEDURE — 36415 COLL VENOUS BLD VENIPUNCTURE: CPT

## 2021-12-18 RX ORDER — FUROSEMIDE 40 MG/1
40 TABLET ORAL DAILY
Qty: 7 TABLET | Refills: 0 | Status: SHIPPED | OUTPATIENT
Start: 2021-12-18 | End: 2021-12-21 | Stop reason: SDUPTHER

## 2021-12-18 NOTE — ED PROVIDER NOTES
Patient is here for evaluation of possible bilateral lower extremity DVT. He states that he was recently diagnosed with COVID-19 pneumonia and admitted to the hospital where he required high flow nasal cannula. He is currently on submental oxygen via nasal cannula 1.5 to 2 L/min during the day and 3 L/min at night. He states that for the past several days he has been having worsening swelling in his lower extremities and is having more pain and swelling in the right compared to the left. Does not report history of heart failure. Does report a history of blood clots in the past which he states that they do not know why they developed. Currently is on Eliquis and reports full compliance with it, last time he took it was last night. He denies any worsening chest pain shortness of breath or any other symptoms. He talked to his primary care doctor about the leg pain and swelling who sent him to the emergency department for evaluation for possible DVT. He has no other complaints.            Past Medical History:   Diagnosis Date    AR (allergic rhinitis) 2009    Diabetes ([facility] [address]     Dr. Farhan Ferrari    History of vascular access device 2020    4f single picc placed on r cephalic, 34DT at 3cm out placed by CARMEN Lowry, difficult limited access    Hypercholesterolemia     Hypertension        Past Surgical History:   Procedure Laterality Date    HX HEART CATHETERIZATION      normal         Family History:   Problem Relation Age of Onset    Heart Failure Father     Diabetes Brother     Heart Failure Paternal Grandfather          age 37 AMI       Social History     Socioeconomic History    Marital status:      Spouse name: Not on file    Number of children: Not on file    Years of education: Not on file    Highest education level: Not on file   Occupational History    Occupation:    Tobacco Use    Smoking status: Former Smoker     Packs/day: 1.00     Years: 15.00     Pack years: 15.00     Types: Cigarettes, Pipe, Cigars     Quit date: 3/12/1991     Years since quittin.7    Smokeless tobacco: Never Used   Substance and Sexual Activity    Alcohol use: No    Drug use: No    Sexual activity: Not on file   Other Topics Concern     Service Not Asked    Blood Transfusions Not Asked    Caffeine Concern Not Asked    Occupational Exposure Not Asked    Hobby Hazards Not Asked    Sleep Concern Not Asked    Stress Concern Not Asked    Weight Concern Not Asked    Special Diet Not Asked    Back Care Not Asked    Exercise No    Bike Helmet Not Asked    Seat Belt Not Asked    Self-Exams Not Asked   Social History Narrative    Not on file     Social Determinants of Health     Financial Resource Strain:     Difficulty of Paying Living Expenses: Not on file   Food Insecurity:     Worried About Running Out of Food in the Last Year: Not on file    Jn of Food in the Last Year: Not on file   Transportation Needs:     Lack of Transportation (Medical): Not on file    Lack of Transportation (Non-Medical):  Not on file   Physical Activity:     Days of Exercise per Week: Not on file    Minutes of Exercise per Session: Not on file   Stress:     Feeling of Stress : Not on file   Social Connections:     Frequency of Communication with Friends and Family: Not on file    Frequency of Social Gatherings with Friends and Family: Not on file    Attends Shinto Services: Not on file    Active Member of Clubs or Organizations: Not on file    Attends Club or Organization Meetings: Not on file    Marital Status: Not on file   Intimate Partner Violence:     Fear of Current or Ex-Partner: Not on file    Emotionally Abused: Not on file    Physically Abused: Not on file    Sexually Abused: Not on file   Housing Stability:     Unable to Pay for Housing in the Last Year: Not on file    Number of [place] in the Last Year: Not on file    Unstable Housing in the Last Year: Not on file         ALLERGIES: Patient has no known allergies. Review of Systems   Constitutional: Negative for chills, fatigue and fever. HENT: Negative for rhinorrhea and sore throat. Eyes: Negative for pain and itching. Respiratory: Negative for cough, shortness of breath and wheezing. Cardiovascular: Positive for leg swelling. Negative for chest pain and palpitations. Gastrointestinal: Negative for abdominal pain, constipation, diarrhea, nausea and vomiting. Genitourinary: Negative for dysuria, flank pain and hematuria. Musculoskeletal: Negative for arthralgias, back pain, gait problem, joint swelling, myalgias, neck pain and neck stiffness. Skin: Negative for color change, pallor, rash and wound. Neurological: Negative for dizziness, syncope, light-headedness, numbness and headaches. Vitals:    12/18/21 1126 12/18/21 1345 12/18/21 1430   BP: (!) 152/82 (!) 114/53 106/62   Pulse: 81     Resp: 20 20 20   Temp: 97.3 °F (36.3 °C)     SpO2: 95% 93% 94%   Weight: (!) 175.3 kg (386 lb 7.5 oz)     Height: 6' 2\" (1.88 m)              Physical Exam  Vitals and nursing note reviewed. Exam conducted with a chaperone present. Constitutional:       General: He is not in acute distress. Appearance: Normal appearance. He is obese. He is not ill-appearing, toxic-appearing or diaphoretic. HENT:      Head: Normocephalic and atraumatic. Right Ear: External ear normal.      Left Ear: External ear normal.      Nose: Nose normal. No congestion or rhinorrhea. Mouth/Throat:      Mouth: Mucous membranes are moist.      Pharynx: Oropharynx is clear. No oropharyngeal exudate or posterior oropharyngeal erythema. Eyes:      General: No scleral icterus. Right eye: No discharge. Left eye: No discharge. Extraocular Movements: Extraocular movements intact. Conjunctiva/sclera: Conjunctivae normal.      Pupils: Pupils are equal, round, and reactive to light.    Neck: Vascular: No carotid bruit. Cardiovascular:      Rate and Rhythm: Normal rate. Pulses: Normal pulses. Heart sounds: Normal heart sounds. Pulmonary:      Effort: Pulmonary effort is normal. No respiratory distress. Breath sounds: Rales (Mild rales in the lower lobes bilaterally) present. Abdominal:      General: Abdomen is flat. Bowel sounds are normal. There is no distension. Palpations: Abdomen is soft. There is no mass. Tenderness: There is no abdominal tenderness. There is no guarding or rebound. Hernia: No hernia is present. Musculoskeletal:         General: Swelling and tenderness present. No deformity or signs of injury. Normal range of motion. Cervical back: Normal range of motion and neck supple. No rigidity or tenderness. Right lower leg: Edema present. Left lower leg: Edema present. Comments: Patient able to ambulate. 3-4+ edema in the lower extremities bilaterally, right worse than left. Does have mild tenderness in the right lower extremity in the posterior calf area. No palpable cords bilaterally. No erythema warmth crepitus rash to lower extremities. Lymphadenopathy:      Cervical: No cervical adenopathy. Skin:     General: Skin is warm and dry. Capillary Refill: Capillary refill takes less than 2 seconds. Coloration: Skin is not jaundiced or pale. Findings: No bruising, erythema, lesion or rash. Neurological:      General: No focal deficit present. Mental Status: He is alert and oriented to person, place, and time. Mental status is at baseline. Psychiatric:         Mood and Affect: Mood normal.         Behavior: Behavior normal.          [facility]  ED Course as of 12/18/21 1500   Sat Dec 18, 2021   1127 Patient examined. With hypertension. On supple oxygen at his baseline level. Does have worsening swelling in the bilateral lower extremities.   Mild rales in the lower lobes but it is difficult to appreciate due to body habitus. He denies any respiratory complaints. States that he is here just for worsening leg swelling and leg pain which is worse on the right than the left but is present bilaterally. Will get DVT study will get labs chest x-ray EKG. Does report full compliance with Eliquis last took last night. [AF]   1151 XR CHEST PORT [AF]   1345 Chest x-ray with improving signs of Covid infection no new acute process per radiology read. EKG with normal sinus rhythm, rate 73, normal IL interval, normal QRS, nonspecific ST changes, normal QTC. [AF]   1152 XR CHEST PORT [AF]   4648 URINALYSIS W/ RFLX MICROSCOPIC:    Color YELLOW/STRAW   Appearance CLEAR   Specific gravity 1.008   pH (UA) 7.5   Protein Negative   Glucose Negative   Ketone Negative   Bilirubin Negative   Blood Negative   Urobilinogen 0.2   Nitrites Negative   Leukocyte Esterase Negative [AF]   1223 CBC WITH AUTOMATED DIFF(!):    WBC 7.1   RBC 4.25   HGB 12.6   HCT 38.6   MCV 90.8   MCH 29.6   MCHC 32.6   RDW 15.9(!)   PLATELET 160(!)   MPV 9.5   NRBC 0.0   ABSOLUTE NRBC 0.00   NEUTROPHILS 60   LYMPHOCYTES 28   MONOCYTES 10   EOSINOPHILS 2   BASOPHILS 0   IMMATURE GRANULOCYTES 0   ABS. NEUTROPHILS 4.2   ABS. LYMPHOCYTES 2.0   ABS. MONOCYTES 0.7   ABS. EOSINOPHILS 0.1   ABS. BASOPHILS 0.0   ABS. IMM. GRANS. 0.0   DF AUTOMATED [AF]   1223 PRO-BNP(!):    NT pro-(!) [AF]   1223 MAGNESIUM:    Magnesium 2.1 [AF]   1223 COMPREHENSIVE METABOLIC PANEL(!):    Sodium 142   Potassium 3.8   Chloride 105   CO2 30   Anion gap 7   Glucose 100   BUN 14   Creatinine 0.87   BUN/Creatinine ratio 16   GFR est AA >60   GFR est non-AA >60   Calcium 9.0   Bilirubin, total 1.1(!)   ALT 52   AST 24   Alk.  phosphatase 65   Protein, total 6.8   Albumin 2.8(!)   Globulin 4.0   A-G Ratio 0.7(!) [AF]   1227 TROPONIN-HIGH SENSITIVITY:    Troponin-High Sensitivity 8 [AF]   1403 Duplex ultrasound withRight lower extremity venous duplex positive for age indeterminate, non-occlusive deep venous thrombosis in the right popliteal vein. [AF]   4658 Will discuss with hospitalist. [AF]   [phone] Patient in no acute distress. Spoke with wife at the bedside states that leg swelling has gotten worse with past few days right worse than left. Did review previous ultrasounds but did show DVT previously in the right lower extremity. Not sure this is the chronic DVT but there is one new especially given the prothrombotic state of Covid that he recently had. Will call for admission [AF]   5350 Spoke at length with family medicine team does admissions. Unclear if not is new versus his chronic clot. Family medicine team has reviewed labs. They are recommending follow-up with his outpatient primary care doctor on Monday. They will send electronic medication to his primary care doctor. I spoke with the patient who is in agreement with this plan and preferred not to be admitted. I did explain to the patient and his wife that if symptoms worsen or change or if he has any shortness of breath or increased oxygen requirement he needs to call 911 and come to the emergency department immediately. Family medicine team did recommend starting diuretics.   Will start Lasix oral.  Patient discharged to home in the care of his wife who appears reliable and concern for patient's wellbeing. [AF]      ED Course User Index  [AF] Waldemar Mendez,          Procedures

## 2021-12-18 NOTE — ED NOTES
Patient given discharge and medication instructions and verbalized understanding. Refused wheelchair ride to vehicle. Patient ambulating with cane with no difficulty.

## 2021-12-18 NOTE — TELEPHONE ENCOUNTER
Received call from [facility] with Red Flag Complaint. Brief description of triage: see below    Triage indicates for patient to ED now d/t hx or dvts and woresning of edema and new redness in legs bilaterally. Care advice provided, patient verbalizes understanding; denies any other questions or concerns; instructed to call back for any new or worsening symptoms. Attention Provider: Thank you for allowing me to participate in the care of your patient. The patient was connected to triage in response to information provided to the ECC. Please do not respond through this encounter as the response is not directed to a shared pool. Reason for Disposition   Patient sounds very sick or weak to the triager    Answer Assessment - Initial Assessment Questions  1. ONSET: \"When did the swelling start? \" (e.g., minutes, hours, days)      Pt is with caller and caller is pt's wife. This week pts chronic bilateral lower leg swelling has gotten worse. Pt also has some new redness that is bilaterally from calves down to feet. 2. LOCATION: \"What part of the leg is swollen? \"  \"Are both legs swollen or just one leg? \"      Bilaterally from knee down to feet    3. SEVERITY: \"How bad is the swelling? \" (e.g., localized; mild, moderate, severe)   - Localized - small area of swelling localized to one leg   - MILD pedal edema - swelling limited to foot and ankle, pitting edema < 1/4 inch (6 mm) deep, rest and elevation eliminate most or all swelling   - MODERATE edema - swelling of lower leg to knee, pitting edema > 1/4 inch (6 mm) deep, rest and elevation only partially reduce swelling   - SEVERE edema - swelling extends above knee, facial or hand swelling present       Pts wife states that it would be moderate to severe. Pts wife denies swelling in the face or hands. 4. REDNESS: \"Does the swelling look red or infected? \"      Redness down bilaterally from calf down to feet. Pt denies weeping. 5.  PAIN: \"Is the swelling painful to touch? \" If so, ask: \"How painful is it? \"   (Scale 1-10; mild, moderate or severe)      Pt states that it hurts when he squeezes it only 4/10    6. FEVER: \"Do you have a fever? \" If so, ask: \"What is it, how was it measured, and when did it start? \"       Denies    7. CAUSE: \"What do you think is causing the leg swelling? \"  Pt has chronic leg swelling that he has compression socks for. However he is post covid 2 weeks and this past week the patients swelling has gotten worse. Pt does have dvt in right leg that he is eliquis for that. 8. MEDICAL HISTORY: \"Do you have a history of heart failure, kidney disease, liver failure, or cancer? \"  Denies    9. RECURRENT SYMPTOM: \"Have you had leg swelling before? \" If so, ask: \"When was the last time? \" \"What happened that time? \"  Chronic but has gottem worse over past week    10. OTHER SYMPTOMS: \"Do you have any other symptoms? \" (e.g., chest pain, difficulty breathing)        pt denies sob and cp and any other symptoms    11. PREGNANCY: \"Is there any chance you are pregnant? \" \"When was your last menstrual period? \"        NA    Protocols used: LEG SWELLING AND EDEMA-ADULT-

## 2021-12-18 NOTE — ED TRIAGE NOTES
Patient reports swelling to the both legs and pain to the behind right knee. Patient is on 2L of O2 post Covid at baseline. Denies worsening of SOB or CP. Patient takes blood thinner medications. Patient was sent by PCP for DVT elav. Patient is ambulatory in Triage.

## 2021-12-18 NOTE — PROGRESS NOTES
Attempted to bring patient to vascular lab for a BLE venous duplex. Pt getting labs drawn. Told to check back in 10 minutes.

## 2021-12-19 LAB
ATRIAL RATE: 73 BPM
CALCULATED R AXIS, ECG10: 4 DEGREES
CALCULATED T AXIS, ECG11: 23 DEGREES
DIAGNOSIS, 93000: NORMAL
P-R INTERVAL, ECG05: 174 MS
Q-T INTERVAL, ECG07: 404 MS
QRS DURATION, ECG06: 78 MS
QTC CALCULATION (BEZET), ECG08: 445 MS
VENTRICULAR RATE, ECG03: 73 BPM

## 2021-12-21 ENCOUNTER — OFFICE VISIT (OUTPATIENT)
Dept: FAMILY MEDICINE CLINIC | Age: 61
End: 2021-12-21
Payer: COMMERCIAL

## 2021-12-21 VITALS
SYSTOLIC BLOOD PRESSURE: 137 MMHG | HEART RATE: 84 BPM | HEIGHT: 74 IN | DIASTOLIC BLOOD PRESSURE: 67 MMHG | OXYGEN SATURATION: 98 % | BODY MASS INDEX: 40.43 KG/M2 | TEMPERATURE: 98.3 F | WEIGHT: 315 LBS

## 2021-12-21 DIAGNOSIS — U09.9 COVID-19 LONG HAULER MANIFESTING CHRONIC DYSPNEA: ICD-10-CM

## 2021-12-21 DIAGNOSIS — R06.09 COVID-19 LONG HAULER MANIFESTING CHRONIC DYSPNEA: ICD-10-CM

## 2021-12-21 DIAGNOSIS — I82.531 CHRONIC DEEP VEIN THROMBOSIS (DVT) OF POPLITEAL VEIN OF RIGHT LOWER EXTREMITY (HCC): ICD-10-CM

## 2021-12-21 DIAGNOSIS — R60.0 BILATERAL LOWER EXTREMITY EDEMA: ICD-10-CM

## 2021-12-21 PROBLEM — I82.409 DEEP VEIN THROMBOSIS (DVT) OF LOWER EXTREMITY (HCC): Status: ACTIVE | Noted: 2021-11-27

## 2021-12-21 PROCEDURE — 99214 OFFICE O/P EST MOD 30 MIN: CPT | Performed by: STUDENT IN AN ORGANIZED HEALTH CARE EDUCATION/TRAINING PROGRAM

## 2021-12-21 RX ORDER — POTASSIUM CHLORIDE 20 MEQ/1
20 TABLET, EXTENDED RELEASE ORAL 2 TIMES DAILY
Qty: 30 TABLET | Refills: 1 | Status: SHIPPED | OUTPATIENT
Start: 2021-12-21 | End: 2022-01-05 | Stop reason: SDUPTHER

## 2021-12-21 RX ORDER — FUROSEMIDE 40 MG/1
40 TABLET ORAL DAILY
Qty: 30 TABLET | Refills: 1 | Status: SHIPPED | OUTPATIENT
Start: 2021-12-21 | End: 2022-01-05 | Stop reason: SDUPTHER

## 2021-12-21 RX ORDER — ALFUZOSIN HYDROCHLORIDE 10 MG/1
TABLET, EXTENDED RELEASE ORAL DAILY
COMMUNITY

## 2021-12-21 NOTE — PROGRESS NOTES
Progress Note    he is a 64y.o. year old male who presents for evalution. Assessment/ Plan:   Diagnoses and all orders for this visit:    1. Bilateral lower extremity edema  -     REFERRAL TO VASCULAR SURGERY  -     furosemide (Lasix) 40 mg tablet; Take 1 Tablet by mouth daily. Indications: visible water retention  -     potassium chloride (K-DUR, KLOR-CON M20) 20 mEq tablet; Take 1 Tablet by mouth two (2) times a day. 2. COVID-19 long hauler manifesting chronic dyspnea  Assessment & Plan:  New O2 requirement, weaning down      3. Chronic deep vein thrombosis (DVT) of popliteal vein of right lower extremity (HCC)  Assessment & Plan:  He feels that clot seen on recent ultrasound is the same as before, chronic. Continue eliquis. Follow-up and Dispositions    · Return in about 4 weeks (around 1/18/2022) for follow-up edema and post-covid. I have discussed the diagnosis with the patient and the intended plan as seen in the above orders. The patient has received an after-visit summary and questions were answered concerning future plans. Pt conveyed understanding of plan. Medication Side Effects and Warnings were discussed with patient        Subjective:     Chief Complaint   Patient presents with    Post-COVID Symptoms    Swelling    Blood Clot     Recent ER visit, DVT seen on ultrasound  Reports that he wasn't admitted but had to have to close follow-up  Still with swelling of bilateral feet   Compression hose 20-30 mmhg   No pain   Redness, seems to be new/spreading since Saturday. No fevers or chills  Has had swelling of R leg since adolescence , never on the left until early last week. Varicose veins, treatment had been recommended. Uncertain of who they saw. On oxygen, 1.5 daytime and 3 L at night. Weaning down with PT  Dyspnea with long stretch of walking. Still with cough, described as deep by wife.   Productive of clear phlegm    Specialists:   Miles Marroquin, endocrinologist  Dr. Felice Landaverde at [facility] urology  No pulm or cardiologist    Works in a [address], previously declined lymphedema PT      Reviewed PmHx, RxHx, FmHx, SocHx, AllgHx and updated and dated in the chart. Review of Systems - negative except as listed above in the HPI    Objective:     Vitals:    12/21/21 0741   BP: 137/67   Pulse: 84   Temp: 98.3 °F (36.8 °C)   SpO2: 98%   Weight: (!) 380 lb (172.4 kg)   Height: 6' 2\" (1.88 m)       Current Outpatient Medications   Medication Sig    alfuzosin SR (UROXATRAL) 10 mg SR tablet Take  by mouth daily.  furosemide (Lasix) 40 mg tablet Take 1 Tablet by mouth daily. Indications: visible water retention    potassium chloride (K-DUR, KLOR-CON M20) 20 mEq tablet Take 1 Tablet by mouth two (2) times a day.  insulin glargine U-300 conc (Toujeo Max U-300 SoloStar) 300 unit/mL (3 mL) inpn 100 Units by SubCUTAneous route nightly.  apixaban (ELIQUIS) 5 mg tablet Take 5 mg by mouth two (2) times a day. Indications: blood clot in a deep vein of the extremities    metoprolol succinate (TOPROL-XL) 50 mg XL tablet Take 50 mg by mouth daily.  insulin lispro (HUMALOG) 100 unit/mL kwikpen by SubCUTAneous route Before breakfast, lunch, and dinner. Sliding scale    dilTIAZem ER (CARDIZEM LA) 240 mg Tb24 tablet Take 240 mg by mouth daily.  hydroCHLOROthiazide (HYDRODIURIL) 25 mg tablet Take 25 mg by mouth daily.  rosuvastatin (CRESTOR) 20 mg tablet Take 20 mg by mouth nightly.  metFORMIN (GLUCOPHAGE) 1,000 mg tablet Take 1,000 mg by mouth two (2) times daily (with meals). No current facility-administered medications for this visit. Physical Exam  Vitals and nursing note reviewed. Constitutional:       General: He is not in acute distress. Appearance: Normal appearance. He is obese. He is not ill-appearing, toxic-appearing or diaphoretic. HENT:      Head: Normocephalic and atraumatic. Eyes:      General: No scleral icterus. Right eye: No discharge. Left eye: No discharge. Conjunctiva/sclera: Conjunctivae normal.   Cardiovascular:      Rate and Rhythm: Normal rate and regular rhythm. Pulses: Normal pulses. Heart sounds: Normal heart sounds. Pulmonary:      Effort: Pulmonary effort is normal. No respiratory distress. Breath sounds: Normal breath sounds. Musculoskeletal:      Cervical back: No rigidity. Right lower leg: 3+ Pitting Edema present. Left lower leg: 3+ Pitting Edema present. Skin:     General: Skin is warm and dry. Neurological:      General: No focal deficit present. Mental Status: He is alert.               Mari Sigala MD

## 2021-12-21 NOTE — PROGRESS NOTES
Parvin Pro is a 64 y.o. male , id x 2(name and ). Reviewed record, history, and  medications. Chief Complaint   Patient presents with    Post-COVID Symptoms    Swelling    Blood Clot     *went to [facility] for b/l for swelling and was given lasix in hospital and given and rx for home   *Wife is concerned about pt's breathing and redness on his feet  * also has prior hx of unprovoked blood clots  * states that he has PT in home. Vitals:    21 0741   BP: 137/67   Pulse: 84   Temp: 98.3 °F (36.8 °C)   SpO2: 98%   Weight: (!) 380 lb (172.4 kg)   Height: 6' 2\" (1.88 m)       Coordination of Care Questionnaire:   1) Have you been to an emergency room, urgent care, or hospitalized since your last visit?   no       2. Have seen or consulted any other health care provider since your last visit? NO      3 most recent PHQ Screens 2021   Little interest or pleasure in doing things Not at all   Feeling down, depressed, irritable, or hopeless Not at all   Total Score PHQ 2 0       Patient is accompanied by self I have received verbal consent from Parvin Pro to discuss any/all medical information while they are present in the room.

## 2021-12-21 NOTE — PATIENT INSTRUCTIONS

## 2021-12-27 ENCOUNTER — DOCUMENTATION ONLY (OUTPATIENT)
Dept: FAMILY MEDICINE CLINIC | Age: 61
End: 2021-12-27

## 2021-12-27 NOTE — PROGRESS NOTES
[address] order was put on Dr Menendez Found desk to process Prior auth initiated for fluticasone.     Pt has failed OTC fluticasone and Nasonex.

## 2022-01-02 PROBLEM — U09.9 COVID-19 LONG HAULER MANIFESTING CHRONIC DYSPNEA: Status: ACTIVE | Noted: 2022-01-02

## 2022-01-02 PROBLEM — R06.09 COVID-19 LONG HAULER MANIFESTING CHRONIC DYSPNEA: Status: ACTIVE | Noted: 2022-01-02

## 2022-01-05 ENCOUNTER — OFFICE VISIT (OUTPATIENT)
Dept: FAMILY MEDICINE CLINIC | Age: 62
End: 2022-01-05

## 2022-01-05 VITALS
WEIGHT: 315 LBS | TEMPERATURE: 98 F | HEIGHT: 74 IN | BODY MASS INDEX: 40.43 KG/M2 | HEART RATE: 76 BPM | SYSTOLIC BLOOD PRESSURE: 143 MMHG | OXYGEN SATURATION: 96 % | DIASTOLIC BLOOD PRESSURE: 79 MMHG

## 2022-01-05 DIAGNOSIS — R60.0 BILATERAL LOWER EXTREMITY EDEMA: ICD-10-CM

## 2022-01-05 DIAGNOSIS — U09.9 COVID-19 LONG HAULER MANIFESTING CHRONIC DYSPNEA: Primary | ICD-10-CM

## 2022-01-05 DIAGNOSIS — R06.09 COVID-19 LONG HAULER MANIFESTING CHRONIC DYSPNEA: Primary | ICD-10-CM

## 2022-01-05 DIAGNOSIS — R23.4 PEELING SKIN: ICD-10-CM

## 2022-01-05 PROBLEM — R65.20 SEVERE SEPSIS (HCC): Status: RESOLVED | Noted: 2020-08-15 | Resolved: 2022-01-05

## 2022-01-05 PROBLEM — U07.1 ACUTE HYPOXEMIC RESPIRATORY FAILURE DUE TO COVID-19 (HCC): Status: RESOLVED | Noted: 2021-11-26 | Resolved: 2022-01-05

## 2022-01-05 PROBLEM — R77.8 ELEVATED TROPONIN: Status: RESOLVED | Noted: 2021-11-27 | Resolved: 2022-01-05

## 2022-01-05 PROBLEM — E87.20 LACTIC ACIDOSIS: Status: RESOLVED | Noted: 2020-08-15 | Resolved: 2022-01-05

## 2022-01-05 PROBLEM — J96.01 ACUTE HYPOXEMIC RESPIRATORY FAILURE DUE TO COVID-19 (HCC): Status: RESOLVED | Noted: 2021-11-26 | Resolved: 2022-01-05

## 2022-01-05 PROBLEM — R65.10 SIRS (SYSTEMIC INFLAMMATORY RESPONSE SYNDROME) (HCC): Status: RESOLVED | Noted: 2020-08-15 | Resolved: 2022-01-05

## 2022-01-05 PROBLEM — A41.9 SEVERE SEPSIS (HCC): Status: RESOLVED | Noted: 2020-08-15 | Resolved: 2022-01-05

## 2022-01-05 PROBLEM — R79.89 POSITIVE D DIMER: Status: RESOLVED | Noted: 2021-12-03 | Resolved: 2022-01-05

## 2022-01-05 PROCEDURE — 99214 OFFICE O/P EST MOD 30 MIN: CPT | Performed by: NURSE PRACTITIONER

## 2022-01-05 RX ORDER — FUROSEMIDE 40 MG/1
40 TABLET ORAL DAILY
Qty: 90 TABLET | Refills: 1 | Status: SHIPPED | OUTPATIENT
Start: 2022-01-05 | End: 2022-01-17

## 2022-01-05 RX ORDER — POTASSIUM CHLORIDE 20 MEQ/1
20 TABLET, EXTENDED RELEASE ORAL 2 TIMES DAILY
Qty: 180 TABLET | Refills: 1 | Status: SHIPPED | OUTPATIENT
Start: 2022-01-05 | End: 2022-04-12

## 2022-01-05 NOTE — LETTER
NOTIFICATION RETURN TO WORK / SCHOOL    1/5/2022 3:11 PM    Mr. Marilou dEmond  [address] [place] [id number]      To Whom It May Concern:    Marilou Edmond is currently under the care of [address]. He may return to work 1/2 days beginning 1/10/2022, and return to full duty beginning 1/24/2022. If there are questions or concerns please have the patient contact our office.         Sincerely,      Bernadine Maya NP

## 2022-01-05 NOTE — PROGRESS NOTES
Estela Gonzalez is a 64 y.o. male , id x 2(name and ). Reviewed record, history, and  medications. Chief Complaint   Patient presents with    Letter for School/Work     RTW clearance     Skin Problem     skin on the bottom of his feet are peeling and he is not sure why        Vitals:    22 1444   BP: (!) 143/79   Pulse: 76   Temp: 98 °F (36.7 °C)   SpO2: 96%   Weight: (!) 386 lb 3.2 oz (175.2 kg)   Height: 6' 2\" (1.88 m)       Coordination of Care Questionnaire:   1) Have you been to an emergency room, urgent care, or hospitalized since your last visit?   no       2. Have seen or consulted any other health care provider since your last visit? NO      3 most recent PHQ Screens 2022   Little interest or pleasure in doing things Not at all   Feeling down, depressed, irritable, or hopeless Not at all   Total Score PHQ 2 0       Patient is accompanied by self I have received verbal consent from Estela Gonzalez to discuss any/all medical information while they are present in the room.

## 2022-01-06 ENCOUNTER — DOCUMENTATION ONLY (OUTPATIENT)
Dept: FAMILY MEDICINE CLINIC | Age: 62
End: 2022-01-06

## 2022-01-09 NOTE — PROGRESS NOTES
Xena Davies (: 1960) is a 64 y.o. male, established patient, here for evaluation of the following chief complaint(s):  Letter for School/Work (RTW clearance ) and Skin Problem (skin on the bottom of his feet are peeling and he is not sure why )       ASSESSMENT/PLAN:  Below is the assessment and plan developed based on review of pertinent history, physical exam, labs, studies, and medications. 1. COVID-19 long hauler manifesting chronic dyspnea  Improving  RTW 1/2 days x 2 weeks, then to full duty  Letter drafted and given to patient at time of visit    2. Bilateral lower extremity edema  Improving per patient report  Continue rx  -     furosemide (Lasix) 40 mg tablet; Take 1 Tablet by mouth daily. Indications: visible water retention, Normal, Disp-90 Tablet, R-1  -     potassium chloride (K-DUR, KLOR-CON M20) 20 mEq tablet; Take 1 Tablet by mouth two (2) times a day., Normal, Disp-180 Tablet, R-1    3. Peeling skin  Suspect sequelae of skin manifestation of covid infection  Add amlactin- patient prefers to obtain otc as his insurance deductible has reset    Return in about 3 months (around 2022), or if symptoms worsen or fail to improve. SUBJECTIVE/OBJECTIVE:  Presents for follow up of long covid, and evaluation of peeling skin on feet. Had covid infection in December, was hospitalized and discharged on home oxygen therapy. Over the past month he has been slowly recovering, has completed PT and is off of O2. Chronic dyspnea has been slowly improving, he is tolerating light activity without dyspnea or desaturations. He'd like to return to work and is in need of letter to employer clearing him to do so. Return to work plan recommended by PT is to return for 1/2 days in office, 1/2 day at home for the first 2 weeks, then return to full duty. C/o peeling skin on feet for the past week or so- no burning or itching.  He does report he had significant edema in both feet and \"covid toes\" during his acute infection. Has tried no medication or other treatment for the symptoms. He does suffer from chronic LE edema and is requesting refill of furosemide and potassium. Past Medical History:   Diagnosis Date    Acute hypoxemic respiratory failure due to COVID-19 [facility]) 2021    AR (allergic rhinitis) 2009    Diabetes ([facility]     Dr. Antonia García    History of vascular access device 2020    4f single picc placed on r cephalic, 58RN at 3cm out placed by CARMEN Lowry, difficult limited access    Hypercholesterolemia     Hypertension     SIRS (systemic inflammatory response syndrome) ([facility] 8/15/2020     Past Surgical History:   Procedure Laterality Date    HX HEART CATHETERIZATION      normal     Family History   Problem Relation Age of Onset    Heart Failure Father     Diabetes Brother     Heart Failure Paternal Grandfather          age 37 AMI     Social History     Tobacco Use    Smoking status: Former Smoker     Packs/day: 1.00     Years: 15.00     Pack years: 15.00     Types: Cigarettes, Pipe, Cigars     Quit date: 3/12/1991     Years since quittin.8    Smokeless tobacco: Never Used   Substance Use Topics    Alcohol use: No         Review of Systems   Constitutional: Positive for fatigue. Negative for chills, fever and unexpected weight change. HENT: Negative. Eyes: Negative. Respiratory: Negative for cough, chest tightness and wheezing. Cardiovascular: Positive for leg swelling. Negative for chest pain and palpitations. Gastrointestinal: Negative. Endocrine: Negative. Genitourinary: Negative. Musculoskeletal: Negative. Skin: Negative. Allergic/Immunologic: Negative. Neurological: Negative. Hematological: Negative. Psychiatric/Behavioral: Negative.       Visit Vitals  BP (!) 143/79   Pulse 76   Temp 98 °F (36.7 °C)   Ht 6' 2\" (1.88 m)   Wt (!) 386 lb 3.2 oz (175.2 kg)   SpO2 96%   BMI 49.59 kg/m²       Physical Examination: General appearance - alert, well appearing, and in no distress and oriented to person, place, and time  Mental status - normal mood, behavior, speech, dress, motor activity, and thought processes  Eyes - sclera anicteric  Neck - supple, no significant adenopathy, thyroid exam: thyroid is normal in size without nodules or tenderness  Chest - clear to auscultation, no wheezes, rales or rhonchi, symmetric air entry  Heart - normal rate, regular rhythm, normal S1, S2, no murmurs, rubs, clicks or gallops, normal bilateral carotid upstroke without bruits, no JVD  Abdomen - soft, nontender, nondistended, no masses or organomegaly  bowel sounds normal  Neurological - alert, oriented, normal speech, no focal findings or movement disorder noted  Extremities - 2+ LE edema, intact peripheral pulses, no edema, redness or tenderness in the calves or thighs  Skin - normal coloration and turgor  Mild erythema of soles of feet bilaterally with some peeling of skin. Nontender. No open areas. Significant callus on heels bilaterally. An electronic signature was used to authenticate this note.   -- Tushar Munguia NP

## 2022-01-10 ENCOUNTER — DOCUMENTATION ONLY (OUTPATIENT)
Dept: FAMILY MEDICINE CLINIC | Age: 62
End: 2022-01-10

## 2022-01-15 DIAGNOSIS — R60.0 BILATERAL LOWER EXTREMITY EDEMA: ICD-10-CM

## 2022-01-17 RX ORDER — FUROSEMIDE 40 MG/1
40 TABLET ORAL DAILY
Qty: 30 TABLET | Refills: 1 | Status: SHIPPED | OUTPATIENT
Start: 2022-01-17 | End: 2022-04-12

## 2022-02-18 RX ORDER — APIXABAN 5 MG/1
TABLET, FILM COATED ORAL
Qty: 180 TABLET | Refills: 3 | Status: SHIPPED | OUTPATIENT
Start: 2022-02-18

## 2022-03-18 PROBLEM — I89.0 CHRONIC ACQUIRED LYMPHEDEMA: Status: ACTIVE | Noted: 2021-11-27

## 2022-03-18 PROBLEM — R60.0 BILATERAL LOWER EXTREMITY EDEMA: Status: ACTIVE | Noted: 2021-12-21

## 2022-03-19 PROBLEM — N40.0 BPH (BENIGN PROSTATIC HYPERPLASIA): Status: ACTIVE | Noted: 2021-11-27

## 2022-03-19 PROBLEM — I82.531 CHRONIC DEEP VEIN THROMBOSIS (DVT) OF POPLITEAL VEIN OF RIGHT LOWER EXTREMITY (HCC): Status: ACTIVE | Noted: 2021-11-27

## 2022-03-19 PROBLEM — U09.9 COVID-19 LONG HAULER MANIFESTING CHRONIC DYSPNEA: Status: ACTIVE | Noted: 2022-01-02

## 2022-03-19 PROBLEM — R06.09 COVID-19 LONG HAULER MANIFESTING CHRONIC DYSPNEA: Status: ACTIVE | Noted: 2022-01-02

## 2022-03-20 PROBLEM — K59.00 CONSTIPATION: Status: ACTIVE | Noted: 2021-12-03

## 2022-12-06 DIAGNOSIS — R60.0 BILATERAL LOWER EXTREMITY EDEMA: ICD-10-CM

## 2022-12-06 RX ORDER — POTASSIUM CHLORIDE 20 MEQ/1
TABLET, EXTENDED RELEASE ORAL
Qty: 180 TABLET | Refills: 0 | Status: SHIPPED | OUTPATIENT
Start: 2022-12-06

## 2022-12-06 NOTE — LETTER
12/6/2022 1:41 PM    Mr. Lisandro Monk  [address] [facility] [id number]      Dear Mr. Clementsy Ruben:    Cassidy Elaine approved your requested medication. However, you are overdue for your follow up and labs. Please call our office at [phone] and schedule a follow up appointment for your continued care.         Sincerely,      Ayanna Dillon NP

## 2022-12-06 NOTE — TELEPHONE ENCOUNTER
Overdue for follow up visit and labs, please call patient to schedule.  I approved 90 day supply, must be seen before next refill

## 2023-02-28 RX ORDER — APIXABAN 5 MG/1
TABLET, FILM COATED ORAL
Qty: 180 TABLET | Refills: 0 | Status: SHIPPED | OUTPATIENT
Start: 2023-02-28

## 2023-02-28 NOTE — TELEPHONE ENCOUNTER
Called and spoke with patient. Advised of RX sent to pharmacy and that he be need to be seen for further refills. Patient verbalized understanding.  Apt scheduled for 4/26/23

## 2023-04-26 ENCOUNTER — OFFICE VISIT (OUTPATIENT)
Dept: FAMILY MEDICINE CLINIC | Age: 63
End: 2023-04-26
Payer: COMMERCIAL

## 2023-04-26 VITALS
HEIGHT: 74 IN | DIASTOLIC BLOOD PRESSURE: 71 MMHG | BODY MASS INDEX: 40.43 KG/M2 | SYSTOLIC BLOOD PRESSURE: 121 MMHG | OXYGEN SATURATION: 96 % | WEIGHT: 315 LBS | HEART RATE: 76 BPM | TEMPERATURE: 98.1 F | RESPIRATION RATE: 18 BRPM

## 2023-04-26 DIAGNOSIS — I82.531 CHRONIC DEEP VEIN THROMBOSIS (DVT) OF POPLITEAL VEIN OF RIGHT LOWER EXTREMITY (HCC): Primary | ICD-10-CM

## 2023-04-26 DIAGNOSIS — R33.8 BENIGN PROSTATIC HYPERPLASIA WITH URINARY RETENTION: ICD-10-CM

## 2023-04-26 DIAGNOSIS — Z12.5 PROSTATE CANCER SCREENING: ICD-10-CM

## 2023-04-26 DIAGNOSIS — R60.0 BILATERAL LOWER EXTREMITY EDEMA: ICD-10-CM

## 2023-04-26 DIAGNOSIS — N40.1 BENIGN PROSTATIC HYPERPLASIA WITH URINARY RETENTION: ICD-10-CM

## 2023-04-26 DIAGNOSIS — Z12.11 COLON CANCER SCREENING: ICD-10-CM

## 2023-04-26 PROCEDURE — 3074F SYST BP LT 130 MM HG: CPT | Performed by: FAMILY MEDICINE

## 2023-04-26 PROCEDURE — 99214 OFFICE O/P EST MOD 30 MIN: CPT | Performed by: FAMILY MEDICINE

## 2023-04-26 PROCEDURE — 3078F DIAST BP <80 MM HG: CPT | Performed by: FAMILY MEDICINE

## 2023-04-26 RX ORDER — ALFUZOSIN HYDROCHLORIDE 10 MG/1
10 TABLET, EXTENDED RELEASE ORAL DAILY
Qty: 90 TABLET | Refills: 3 | Status: SHIPPED | OUTPATIENT
Start: 2023-04-26

## 2023-04-26 NOTE — PROGRESS NOTES
Chief Complaint   Patient presents with    Follow-up    Labs     Patient presents in office today for follow up and labs. No concerns. 1. Have you been to the ER, urgent care clinic since your last visit? Hospitalized since your last visit? No    2. Have you seen or consulted any other health care providers outside of the [address] [place] [address] since your last visit? Include any pap smears or colon screening.  No      Learning Assessment 8/6/2019   PRIMARY LEARNER Patient   PRIMARY LANGUAGE ENGLISH   LEARNER PREFERENCE PRIMARY LISTENING   ANSWERED BY self   RELATIONSHIP SELF

## 2023-04-26 NOTE — PATIENT INSTRUCTIONS
A Healthy Lifestyle: Care Instructions  A healthy lifestyle can help you feel good, have more energy, and stay at a weight that's healthy for you. You can share a healthy lifestyle with your friends and family. And you can do it on your own. Eat meals with your friends or family. You could try cooking together. Plan activities with other people. Go for a walk with a friend, try a free online fitness class, or join a sports league. Eat a variety of healthy foods. These include fruits, vegetables, whole grains, low-fat dairy, and lean protein. Choose healthy portions of food. You can use the Nutrition Facts label on food packages as a guide. Eat more fruits and vegetables. You could add vegetables to sandwiches or add fruit to cereal.   Drink water when you are thirsty. Limit soda, juice, and sports drinks. Try to exercise most days. Aim for at least 2½ hours of exercise each week. Keep moving. Work in the garden or take your dog on a walk. Use the stairs instead of the elevator. If you use tobacco or nicotine, try to quit. Ask your doctor about programs and medicines to help you quit. Limit alcohol. Men should have no more than 2 drinks a day. Women should have no more than 1. For some people, no alcohol is the best choice. Follow-up care is a key part of your treatment and safety. Be sure to make and go to all appointments, and call your doctor if you are having problems. It's also a good idea to know your test results and keep a list of the medicines you take. Where can you learn more? Go to http://www.gray.com/  Enter M561 in the search box to learn more about \"A Healthy Lifestyle: Care Instructions. \"  Current as of: November 14, 2022               Content Version: 13.6  © 8910-5068 Healthwise, [employer]. Care instructions adapted under license by [employer] (which disclaims liability or warranty for this information).  If you have questions about a medical condition or this instruction, always ask your healthcare professional. [place] [address] any warranty or liability for your use of this information.

## 2023-04-26 NOTE — PROGRESS NOTES
Progress Note    he is a 58y.o. year old male who presents for evalution. Subjective:     Pt here for follow up and his A1C is managed by endo Dr Dr Zeynep Menezes and was recently 9. 2.  he is on eliquis and it seems he had one in proximal femoral then next scan had another in popliteal of same leg. No issues with bleeding. He is on lasix daily and states he is not taking the potassium on a regular basis. Taking 3/7 days of the week. On Uroxatral but has not seen urology since 2021. Would like me to take this over, hx of retention. Reviewed PmHx, RxHx, FmHx, SocHx, AllgHx and updated and dated in the chart. Review of Systems - negative except as listed above in the HPI    Objective:     Vitals:    04/26/23 1551   BP: 121/71   Pulse: 76   Resp: 18   Temp: 98.1 °F (36.7 °C)   TempSrc: Oral   SpO2: 96%   Weight: (!) 384 lb (174.2 kg)   Height: 6' 2\" (1.88 m)       Current Outpatient Medications   Medication Sig    apixaban (Eliquis) 5 mg tablet Take 1 Tablet by mouth two (2) times a day. alfuzosin SR (UROXATRAL) 10 mg SR tablet Take 1 Tablet by mouth daily. potassium chloride (K-DUR, KLOR-CON M20) 20 mEq tablet TAKE 1 TABLET BY MOUTH  TWICE DAILY    furosemide (LASIX) 40 mg tablet TAKE 1 TABLET BY MOUTH  DAILY FOR VISIBLE WATER  RETENTION    insulin glargine U-300 conc (Toujeo Max U-300 SoloStar) 300 unit/mL (3 mL) inpn 100 Units by SubCUTAneous route nightly. metoprolol succinate (TOPROL-XL) 50 mg XL tablet Take 1 Tablet by mouth daily. insulin lispro (HUMALOG) 100 unit/mL kwikpen by SubCUTAneous route Before breakfast, lunch, and dinner. Sliding scale    dilTIAZem ER (CARDIZEM LA) 240 mg Tb24 tablet Take 1 Tablet by mouth daily. hydroCHLOROthiazide (HYDRODIURIL) 25 mg tablet Take 1 Tablet by mouth daily. rosuvastatin (CRESTOR) 20 mg tablet Take 1 Tablet by mouth nightly. metFORMIN (GLUCOPHAGE) 1,000 mg tablet Take 1 Tablet by mouth two (2) times daily (with meals). No current facility-administered medications for this visit. Physical Examination: General appearance - alert, well appearing, and in no distress  Chest - clear to auscultation, no wheezes, rales or rhonchi, symmetric air entry  Heart - normal rate, regular rhythm, normal S1, S2, no murmurs, rubs, clicks or gallops      Assessment/ Plan:   Diagnoses and all orders for this visit:    1. Chronic deep vein thrombosis (DVT) of popliteal vein of right lower extremity (HCC)  -     apixaban (Eliquis) 5 mg tablet; Take 1 Tablet by mouth two (2) times a day. 2. Colon cancer screening  -     REFERRAL TO GASTROENTEROLOGY    3. Bilateral lower extremity edema  -     METABOLIC PANEL, BASIC; Future    4. Prostate cancer screening  -     PSA W/ REFLX FREE PSA; Future    5. Benign prostatic hyperplasia with urinary retention  -     alfuzosin SR (UROXATRAL) 10 mg SR tablet; Take 1 Tablet by mouth daily. Follow-up and Dispositions    Return in about 1 year (around 4/26/2024), or if symptoms worsen or fail to improve. I have discussed the diagnosis with the patient and the intended plan as seen in the above orders. The patient has received an after-visit summary and questions were answered concerning future plans. Pt conveyed understanding of plan.     Medication Side Effects and Warnings were discussed with patient    An electronic signature was used to authenticate this note  Sabine Ogden DO

## 2023-04-27 LAB
ANION GAP SERPL CALC-SCNC: 4 MMOL/L (ref 5–15)
BUN SERPL-MCNC: 22 MG/DL (ref 6–20)
BUN/CREAT SERPL: 22 (ref 12–20)
CALCIUM SERPL-MCNC: 9.5 MG/DL (ref 8.5–10.1)
CHLORIDE SERPL-SCNC: 102 MMOL/L (ref 97–108)
CO2 SERPL-SCNC: 31 MMOL/L (ref 21–32)
CREAT SERPL-MCNC: 1.02 MG/DL (ref 0.7–1.3)
GLUCOSE SERPL-MCNC: 94 MG/DL (ref 65–100)
POTASSIUM SERPL-SCNC: 4.2 MMOL/L (ref 3.5–5.1)
SODIUM SERPL-SCNC: 137 MMOL/L (ref 136–145)

## 2023-04-27 NOTE — PROGRESS NOTES
Your kidney function and potassium are both normal.  He can continue with your current 3 days a week dosing of the potassium.

## 2023-04-28 LAB
PSA SERPL-MCNC: 0.7 NG/ML (ref 0–4)
REFLEX CRITERIA: NORMAL

## 2023-05-08 ENCOUNTER — TELEPHONE (OUTPATIENT)
Age: 63
End: 2023-05-08

## 2023-05-09 NOTE — TELEPHONE ENCOUNTER
Called and LVM for patient to call the office back. Called to report his lab results: Your kidney function and potassium are both normal.  He can continue with your current 3 days a week dosing of the potassium.

## 2023-07-13 LAB — HBA1C MFR BLD HPLC: 7.1 %

## 2024-01-31 RX ORDER — ALFUZOSIN HYDROCHLORIDE 10 MG/1
10 TABLET, EXTENDED RELEASE ORAL DAILY
Qty: 90 TABLET | Refills: 3 | Status: SHIPPED | OUTPATIENT
Start: 2024-01-31

## 2024-04-09 RX ORDER — APIXABAN 5 MG/1
5 TABLET, FILM COATED ORAL 2 TIMES DAILY
Qty: 180 TABLET | Refills: 1 | Status: SHIPPED | OUTPATIENT
Start: 2024-04-09

## 2024-11-25 ENCOUNTER — TELEPHONE (OUTPATIENT)
Facility: CLINIC | Age: 64
End: 2024-11-25

## 2024-11-25 NOTE — TELEPHONE ENCOUNTER
----- Message from Ana CAMPOS sent at 11/25/2024 10:40 AM EST -----  Regarding: E[facility] Appointment Request  E[facility] Appointment Request    Patient needs appointment for E[facility] Appointment Type: Annual Visit.    Patient Requested Dates(s):  12/12/2024 /12/26/2024- 12/27/2024 - all day time available  12/13/2024 and 12/18/2024  - afternoon time available     Patient Requested Time: 12/12/2024 /12/26/2024- 12/27/2024 - all day time available  12/13/2024 and 12/18/2024  - afternoon time available     Provider Name:   Gilles Funez MD    Reason for Appointment Request: Established Patient - Available appointments did not meet patient need.    Patient wanted to set an appointment with his pcp for his Annual wellness visit,     Preferences:  12/12/2024 /12/26/2024- 12/27/2024 - all day time available  12/13/2024 and 12/18/2024  - afternoon time available     --------------------------------------------------------------------------------------------------------------------------    Relationship to Patient: Self     Call Back Information: OK to leave message on [employer]  Preferred Call Back Number: Phone  [phone]

## 2024-12-18 ENCOUNTER — OFFICE VISIT (OUTPATIENT)
Facility: CLINIC | Age: 64
End: 2024-12-18

## 2024-12-18 VITALS
DIASTOLIC BLOOD PRESSURE: 67 MMHG | OXYGEN SATURATION: 95 % | SYSTOLIC BLOOD PRESSURE: 123 MMHG | HEART RATE: 72 BPM | WEIGHT: 315 LBS | TEMPERATURE: 98.3 F | RESPIRATION RATE: 16 BRPM | BODY MASS INDEX: 40.43 KG/M2 | HEIGHT: 74 IN

## 2024-12-18 DIAGNOSIS — I82.531 CHRONIC DEEP VEIN THROMBOSIS (DVT) OF POPLITEAL VEIN OF RIGHT LOWER EXTREMITY (HCC): ICD-10-CM

## 2024-12-18 DIAGNOSIS — E78.5 HYPERLIPIDEMIA, UNSPECIFIED HYPERLIPIDEMIA TYPE: ICD-10-CM

## 2024-12-18 DIAGNOSIS — I15.9 SECONDARY HYPERTENSION: ICD-10-CM

## 2024-12-18 DIAGNOSIS — E11.65 TYPE 2 DIABETES MELLITUS WITH HYPERGLYCEMIA, WITH LONG-TERM CURRENT USE OF INSULIN (HCC): ICD-10-CM

## 2024-12-18 DIAGNOSIS — Z79.4 TYPE 2 DIABETES MELLITUS WITH HYPERGLYCEMIA, WITH LONG-TERM CURRENT USE OF INSULIN (HCC): ICD-10-CM

## 2024-12-18 DIAGNOSIS — Z00.01 ENCOUNTER FOR ANNUAL GENERAL MEDICAL EXAMINATION WITH ABNORMAL FINDINGS IN ADULT: Primary | ICD-10-CM

## 2024-12-18 RX ORDER — AMLODIPINE BESYLATE 5 MG/1
5 TABLET ORAL DAILY
COMMUNITY
Start: 2024-10-18

## 2024-12-18 RX ORDER — ACYCLOVIR 800 MG/1
TABLET ORAL
COMMUNITY
Start: 2024-10-19

## 2024-12-18 RX ORDER — INSULIN LISPRO 100 [IU]/ML
INJECTION, SOLUTION INTRAVENOUS; SUBCUTANEOUS
COMMUNITY
Start: 2024-11-25

## 2024-12-18 RX ORDER — EMPAGLIFLOZIN 25 MG/1
25 TABLET, FILM COATED ORAL DAILY
COMMUNITY
Start: 2024-10-18

## 2024-12-18 SDOH — ECONOMIC STABILITY: INCOME INSECURITY: HOW HARD IS IT FOR YOU TO PAY FOR THE VERY BASICS LIKE FOOD, HOUSING, MEDICAL CARE, AND HEATING?: NOT HARD AT ALL

## 2024-12-18 SDOH — ECONOMIC STABILITY: FOOD INSECURITY: WITHIN THE PAST 12 MONTHS, YOU WORRIED THAT YOUR FOOD WOULD RUN OUT BEFORE YOU GOT MONEY TO BUY MORE.: NEVER TRUE

## 2024-12-18 SDOH — ECONOMIC STABILITY: FOOD INSECURITY: WITHIN THE PAST 12 MONTHS, THE FOOD YOU BOUGHT JUST DIDN'T LAST AND YOU DIDN'T HAVE MONEY TO GET MORE.: NEVER TRUE

## 2024-12-18 ASSESSMENT — ENCOUNTER SYMPTOMS
SHORTNESS OF BREATH: 0
ABDOMINAL PAIN: 0
COUGH: 0
ALLERGIC/IMMUNOLOGIC NEGATIVE: 1
SINUS PAIN: 0
SORE THROAT: 0
EYES NEGATIVE: 1
RHINORRHEA: 0
DIARRHEA: 0
CONSTIPATION: 0

## 2024-12-18 ASSESSMENT — PATIENT HEALTH QUESTIONNAIRE - PHQ9
1. LITTLE INTEREST OR PLEASURE IN DOING THINGS: NOT AT ALL
SUM OF ALL RESPONSES TO PHQ QUESTIONS 1-9: 0
SUM OF ALL RESPONSES TO PHQ9 QUESTIONS 1 & 2: 0
SUM OF ALL RESPONSES TO PHQ QUESTIONS 1-9: 0
SUM OF ALL RESPONSES TO PHQ QUESTIONS 1-9: 0
2. FEELING DOWN, DEPRESSED OR HOPELESS: NOT AT ALL
SUM OF ALL RESPONSES TO PHQ QUESTIONS 1-9: 0

## 2024-12-18 NOTE — ASSESSMENT & PLAN NOTE
Spoke to the patient about getting a second opinion from a hematologist about potentially coming off of the medication Eliquis for DVT prophylaxis.  Discussion was taken place of the potential risk of coming off of the medication.    Orders:    Pancho Vo MD, Hematology/Oncology, [place]

## 2024-12-18 NOTE — ASSESSMENT & PLAN NOTE
Chronic, at goal (stable), continue current plan pending work up below    Orders:    Lipid Panel; Future    Lipid Panel

## 2024-12-18 NOTE — ASSESSMENT & PLAN NOTE
Chronic, at goal (stable), continue current treatment plan    Orders:    Comprehensive Metabolic Panel; Future    Comprehensive Metabolic Panel

## 2024-12-18 NOTE — ASSESSMENT & PLAN NOTE
Monitored by specialist- no acute findings meriting change in the plan    Orders:    Comprehensive Metabolic Panel; Future    Comprehensive Metabolic Panel

## 2024-12-18 NOTE — ASSESSMENT & PLAN NOTE
Recommended to the patient to follow-up with hematology to determine if he can come off the Eliquis.  Spoke to the patient of the potential risks of coming off of Eliquis.    Orders:    Pancho Vo MD, Hematology/Oncology, [place]

## 2024-12-18 NOTE — PROGRESS NOTES
Chief Complaint   Patient presents with    Annual Exam    Diabetes     Endocrinology:Dr Hakeem Noriega    Hypertension    Lab Collection     NON Fasting     \"Have you been to the ER, urgent care clinic since your last visit?  Hospitalized since your last visit?\"    NO    “Have you seen or consulted any other health care providers outside our system since your last visit?”    NO      “Have you had a colorectal cancer screening such as a colonoscopy/FIT/Cologuard?    NO    No colonoscopy on file  No cologuard on file  No FIT/FOBT on file   No flexible sigmoidoscopy on file     “Have you had a diabetic eye exam?”    YES - Where: [facility]: Dr Ran Doan Nurse/CMA to request most recent records if not in the chart     Date of last diabetic eye exam: 2013       {Click Here for Release of Records Request :[id number]}       Elian Pena (:  1960) is a 64 y.o. male, here for evaluation of the following chief complaint(s):  Annual Exam, Diabetes (Endocrinology:Dr Hakeem Noriega), Hypertension, and Lab Collection (NON Fasting)      Subjective     Patient stated he formally followed with Dr. Bender in the practice.    He stated that he currently follows with an endocrinologist who manages his diabetes and his A1c.  He stated that his last A1c was in the .    He stated previously had a blood clot behind his right knee approximately .  He stated he was started on Eliquis.  He stated he has been taking it for several years.  He was curious to see if he could potentially come off the medication because he previously weighed 400 pounds and is now down to 338 pounds.  He denies any reoccurrences of any blood clots.  He denies any history of strokes, PEs.  He stated they were unable to determine the cause of the blood clot in the past.    Patient denies any other acute and/or chronic complaints.      Review of Systems   Constitutional:  Negative for activity change, appetite change and 
Chief Complaint   Patient presents with    Annual Exam    Diabetes    Hypertension    Lab Collection     NON Fasting     \"Have you been to the ER, urgent care clinic since your last visit?  Hospitalized since your last visit?\"    NO    “Have you seen or consulted any other health care providers outside our system since your last visit?”    NO      “Have you had a colorectal cancer screening such as a colonoscopy/FIT/Cologuard?    NO    No colonoscopy on file  No cologuard on file  No FIT/FOBT on file   No flexible sigmoidoscopy on file     “Have you had a diabetic eye exam?”    YES - Where: [facility]: Dr Ran Doan Nurse/CMA to request most recent records if not in the chart     Date of last diabetic eye exam: 4/1/2013            
future plans.     Medication Side Effects and Warnings were discussed with patient  Patient Labs were reviewed and or requested:  Patient Past Records were reviewed and or requested    We discussed the expected course, resolution and complications of the diagnosis(es) in detail.  Medication risks, benefits, costs, interactions, and alternatives were discussed as indicated.  I advised him to contact the office if his condition worsens, changes or fails to improve as anticipated. He expressed understanding with the diagnosis(es) and plan.     Please note that this dictation was completed with [employer], the [employer] voice recognition software and [employer] intellience software.  Quite often unanticipated grammatical, syntax, homophones, and other interpretive errors are inadvertently transcribed by the computer software.  Please disregard these errors.  Please excuse any errors that have escaped final proofreading.  Thank you.     JANELL Morris.    There are no Patient Instructions on file for this visit.

## 2024-12-18 NOTE — ASSESSMENT & PLAN NOTE
Monitored by specialist- no acute findings meriting change in the plan    Orders:    Comprehensive Metabolic Panel; Future

## 2024-12-19 ENCOUNTER — TELEPHONE (OUTPATIENT)
Age: 64
End: 2024-12-19

## 2024-12-19 DIAGNOSIS — E87.6 HYPOKALEMIA: Primary | ICD-10-CM

## 2024-12-19 LAB
ALBUMIN SERPL-MCNC: 3.9 G/DL (ref 3.5–5)
ALBUMIN/GLOB SERPL: 1.4 (ref 1.1–2.2)
ALP SERPL-CCNC: 86 U/L (ref 45–117)
ALT SERPL-CCNC: 28 U/L (ref 12–78)
ANION GAP SERPL CALC-SCNC: 7 MMOL/L (ref 2–12)
AST SERPL-CCNC: 13 U/L (ref 15–37)
BILIRUB SERPL-MCNC: 1.3 MG/DL (ref 0.2–1)
BUN SERPL-MCNC: 21 MG/DL (ref 6–20)
BUN/CREAT SERPL: 21 (ref 12–20)
CALCIUM SERPL-MCNC: 9.6 MG/DL (ref 8.5–10.1)
CHLORIDE SERPL-SCNC: 102 MMOL/L (ref 97–108)
CHOLEST SERPL-MCNC: 133 MG/DL
CO2 SERPL-SCNC: 30 MMOL/L (ref 21–32)
CREAT SERPL-MCNC: 1.01 MG/DL (ref 0.7–1.3)
ERYTHROCYTE [DISTWIDTH] IN BLOOD BY AUTOMATED COUNT: 14.5 % (ref 11.5–14.5)
GLOBULIN SER CALC-MCNC: 2.8 G/DL (ref 2–4)
GLUCOSE SERPL-MCNC: 125 MG/DL (ref 65–100)
HCT VFR BLD AUTO: 44.3 % (ref 36.6–50.3)
HDLC SERPL-MCNC: 46 MG/DL
HDLC SERPL: 2.9 (ref 0–5)
HGB BLD-MCNC: 14.5 G/DL (ref 12.1–17)
LDLC SERPL CALC-MCNC: 39 MG/DL (ref 0–100)
MCH RBC QN AUTO: 29.2 PG (ref 26–34)
MCHC RBC AUTO-ENTMCNC: 32.7 G/DL (ref 30–36.5)
MCV RBC AUTO: 89.3 FL (ref 80–99)
NRBC # BLD: 0 K/UL (ref 0–0.01)
NRBC BLD-RTO: 0 PER 100 WBC
PLATELET # BLD AUTO: 287 K/UL (ref 150–400)
PMV BLD AUTO: 10.6 FL (ref 8.9–12.9)
POTASSIUM SERPL-SCNC: 2.7 MMOL/L (ref 3.5–5.1)
PROT SERPL-MCNC: 6.7 G/DL (ref 6.4–8.2)
RBC # BLD AUTO: 4.96 M/UL (ref 4.1–5.7)
SODIUM SERPL-SCNC: 139 MMOL/L (ref 136–145)
TRIGL SERPL-MCNC: 240 MG/DL
TSH SERPL DL<=0.05 MIU/L-ACNC: 0.85 UIU/ML (ref 0.36–3.74)
VLDLC SERPL CALC-MCNC: 48 MG/DL
WBC # BLD AUTO: 11.5 K/UL (ref 4.1–11.1)

## 2024-12-19 NOTE — RESULT ENCOUNTER NOTE
Notify patient via [employer] message      Your metabolic panel which looks at your liver function, and kidney function looks good.  Your potassium is very low.  Recommend that you go to the emergency room to receive not only oral but IV potassium replenishment.  Will have the nurse call to reach out to ensure that you have received this message and our recommendation.    Your cholesterol is at goal, continue taking Crestor daily and focus on following a heart healthy diet and exercising regularly as you are able.  If you have any questions about a heart healthy diet and exercise, please let me know.    Your CBC which looks at your white blood cells, red blood cells, and platelets came back looking normal. No sign of infection or anemia.     Your TSH which screens for thyroid disease came back normal. This means you likely do not have hyper (high) or hypo (low) functioning thyroid.     Since

## 2024-12-24 ENCOUNTER — TELEPHONE (OUTPATIENT)
Facility: CLINIC | Age: 64
End: 2024-12-24

## 2024-12-24 NOTE — TELEPHONE ENCOUNTER
We received a fax refill request for Elian Pena.  Please escribe apixaban (ELIQUIS) 5 MG TABS tablet  to their pharmacy.  The pharmacy is correct in the chart and they are requesting a ? day supply.

## 2024-12-30 ENCOUNTER — TELEPHONE (OUTPATIENT)
Facility: CLINIC | Age: 64
End: 2024-12-30

## 2024-12-30 NOTE — TELEPHONE ENCOUNTER
We received a fax refill request for Elian Pena.  Please escribe Alfuzosin ER to their pharmacy.  The pharmacy is correct in the chart and they are requesting a 90 day supply.

## 2024-12-31 ENCOUNTER — TELEPHONE (OUTPATIENT)
Facility: CLINIC | Age: 64
End: 2024-12-31

## 2024-12-31 RX ORDER — ALFUZOSIN HYDROCHLORIDE 10 MG/1
10 TABLET, EXTENDED RELEASE ORAL DAILY
Qty: 90 TABLET | Refills: 3 | Status: SHIPPED | OUTPATIENT
Start: 2024-12-31

## 2024-12-31 NOTE — TELEPHONE ENCOUNTER
Pt states he cannot receive his medication at [facility] and prescription needs to go to [facility] mail order pharmacy for apixaban (ELIQUIS) 5 MG TABS tablet for 90 days supply please, Thank You

## 2025-01-02 LAB — HBA1C MFR BLD HPLC: 6.4 %

## 2025-01-16 ENCOUNTER — COMMUNITY OUTREACH (OUTPATIENT)
Facility: CLINIC | Age: 65
End: 2025-01-16

## 2025-01-22 ENCOUNTER — OFFICE VISIT (OUTPATIENT)
Age: 65
End: 2025-01-22
Payer: COMMERCIAL

## 2025-01-22 VITALS
DIASTOLIC BLOOD PRESSURE: 67 MMHG | OXYGEN SATURATION: 99 % | WEIGHT: 315 LBS | SYSTOLIC BLOOD PRESSURE: 125 MMHG | HEIGHT: 74 IN | HEART RATE: 72 BPM | BODY MASS INDEX: 40.43 KG/M2

## 2025-01-22 DIAGNOSIS — D68.59 HYPERCOAGULABLE STATE (HCC): Primary | ICD-10-CM

## 2025-01-22 DIAGNOSIS — M17.10 ARTHRITIS OF KNEE: ICD-10-CM

## 2025-01-22 DIAGNOSIS — I82.531 CHRONIC DEEP VEIN THROMBOSIS (DVT) OF POPLITEAL VEIN OF RIGHT LOWER EXTREMITY (HCC): ICD-10-CM

## 2025-01-22 PROCEDURE — 3074F SYST BP LT 130 MM HG: CPT | Performed by: INTERNAL MEDICINE

## 2025-01-22 PROCEDURE — 3078F DIAST BP <80 MM HG: CPT | Performed by: INTERNAL MEDICINE

## 2025-01-22 PROCEDURE — 99204 OFFICE O/P NEW MOD 45 MIN: CPT | Performed by: INTERNAL MEDICINE

## 2025-01-22 NOTE — PROGRESS NOTES
Elian Pena is a 64 y.o. male   New Patient    Vitals:    01/22/25 1357   BP: 125/67   Site: Right Upper Arm   Pulse: 72   SpO2: 99%   Weight: (!) 152 kg (335 lb)   Height: 1.88 m (6' 2\")     No LMP for male patient.    \"Have you been to the ER, urgent care clinic since your last visit?  Hospitalized since your last visit?\"    NO    “Have you seen or consulted any other health care providers outside of [facility] since your last visit?”    Patient is being seen PCP Dr. Funez

## 2025-01-22 NOTE — PROGRESS NOTES
[facility] [place] at [facility]  [address], [address] [facility] [address]  W: [phone]  F: [phone] Patient ID  Name: Elian Pena  YOB: 1960  MRN: [id number]  Referring Provider:   Hang Dang FNP  [address]  [address]  [place] [address]  Primary Care Provider:   Hang Dang FNP       HEMATOLOGY/MEDICAL ONCOLOGY  NOTE     Reason for Evaluation:     Chief Complaint   Patient presents with    New Patient     Subjective:      History of Present Illness:   Date of Visit: 2025  Elian Pena is a 64 y.o. male who presents for an initial evaluation for HYPERCOAGULABLE STATE.     Denies family history.  Thinks it was an unprovoked clot    Reporteldy taking once a day eliquis (since the beginning of 2025) to ration his supply   -----  Past Medical History:   Diagnosis Date    Acute hypoxemic respiratory failure due to COVID-19 2021    AR (allergic rhinitis) 2009    Diabetes ([facility])     Dr. Mora    History of vascular access device 2020    4f single picc placed on r cephalic, 47cm at 3cm out placed by YAMILKA Aj, difficult limited access    Hypercholesterolemia     Hypertension     Obesity     SIRS (systemic inflammatory response syndrome) ([facility]) 8/15/2020      Past Surgical History:   Procedure Laterality Date    CARDIAC CATHETERIZATION      normal    TONSILLECTOMY  1968      Social History     Tobacco Use    Smoking status: Former     Current packs/day: 0.00     Average packs/day: 1 pack/day for 10.0 years (10.0 ttl pk-yrs)     Types: Cigarettes     Quit date: 3/12/1991     Years since quittin.8    Smokeless tobacco: Never    Tobacco comments:     Quit when I was 30   Substance Use Topics    Alcohol use: No      Family History   Problem Relation Age of Onset    Diabetes Brother     Obesity Brother     Heart Failure Father     Heart Failure Paternal Grandfather          age 43 AMI     Current Outpatient

## 2025-01-24 ENCOUNTER — TELEPHONE (OUTPATIENT)
Facility: CLINIC | Age: 65
End: 2025-01-24

## 2025-01-24 RX ORDER — ALFUZOSIN HYDROCHLORIDE 10 MG/1
10 TABLET, EXTENDED RELEASE ORAL DAILY
Qty: 30 TABLET | Refills: 5 | Status: SHIPPED | OUTPATIENT
Start: 2025-01-24

## 2025-01-24 NOTE — TELEPHONE ENCOUNTER
We received a fax refill request for Elian Pena.  Please escribe Alfuzosin ER TAB to their pharmacy.  The pharmacy is correct in the chart and they are requesting a 90 day supply.

## 2025-01-30 ENCOUNTER — HOSPITAL ENCOUNTER (OUTPATIENT)
Dept: VASCULAR SURGERY | Facility: HOSPITAL | Age: 65
Discharge: HOME | End: 2025-02-01
Attending: INTERNAL MEDICINE
Payer: COMMERCIAL

## 2025-01-30 DIAGNOSIS — I82.531 CHRONIC DEEP VEIN THROMBOSIS (DVT) OF POPLITEAL VEIN OF RIGHT LOWER EXTREMITY (HCC): ICD-10-CM

## 2025-01-30 DIAGNOSIS — D68.59 HYPERCOAGULABLE STATE (HCC): ICD-10-CM

## 2025-01-30 PROCEDURE — 93970 EXTREMITY STUDY: CPT

## 2025-04-09 ENCOUNTER — OFFICE VISIT (OUTPATIENT)
Age: 65
End: 2025-04-09
Payer: COMMERCIAL

## 2025-04-09 VITALS
HEIGHT: 74 IN | OXYGEN SATURATION: 97 % | WEIGHT: 315 LBS | SYSTOLIC BLOOD PRESSURE: 136 MMHG | DIASTOLIC BLOOD PRESSURE: 65 MMHG | HEART RATE: 67 BPM | BODY MASS INDEX: 40.43 KG/M2

## 2025-04-09 DIAGNOSIS — I82.531 CHRONIC DEEP VEIN THROMBOSIS (DVT) OF POPLITEAL VEIN OF RIGHT LOWER EXTREMITY: ICD-10-CM

## 2025-04-09 DIAGNOSIS — M17.10 ARTHRITIS OF KNEE: ICD-10-CM

## 2025-04-09 DIAGNOSIS — D68.59 HYPERCOAGULABLE STATE: Primary | ICD-10-CM

## 2025-04-09 PROCEDURE — 3078F DIAST BP <80 MM HG: CPT | Performed by: INTERNAL MEDICINE

## 2025-04-09 PROCEDURE — 3075F SYST BP GE 130 - 139MM HG: CPT | Performed by: INTERNAL MEDICINE

## 2025-04-09 PROCEDURE — 99214 OFFICE O/P EST MOD 30 MIN: CPT | Performed by: INTERNAL MEDICINE

## 2025-04-09 NOTE — PROGRESS NOTES
Elian Pena is a 64 y.o. male   Follow-up    Vitals:    04/09/25 0846   BP: 136/65   BP Site: Left Upper Arm   Pulse: 67   SpO2: 97%   Weight: (!) 159.8 kg (352 lb 3.2 oz)   Height: 1.88 m (6' 2\")     No LMP for male patient.    \"Have you been to the ER, urgent care clinic since your last visit?  Hospitalized since your last visit?\"    NO    “Have you seen or consulted any other health care providers outside of [facility] since your last visit?”    NO

## 2025-04-09 NOTE — PROGRESS NOTES
[facility] [place] at [facility]  [address], [address] [facility] [address]  W: [phone]  F: [phone] Patient ID  Name: Elian Pena  YOB: 1960  MRN: [id number]  Referring Provider:   No referring provider defined for this encounter.  Primary Care Provider:   Hang Dang FNP       HEMATOLOGY/MEDICAL ONCOLOGY  NOTE     Reason for Evaluation:     Chief Complaint   Patient presents with    Follow-up     Subjective:      History of Present Illness:   Date of Visit: 2025  Elian Pena is a 64 y.o. male who presents for a follow-up evaluation for hypercoagulable state.             Patient overall reports feeling stable. Reports that he needs to lose more weight prior to surgery.  Was making good progress with physical therapy but had a Shingles reactivation that delayed his progress. He s planning to call  PT to get back on track.  He has had some issues getting access to Eliquis. He reportedly reactivated his Rx discount which is back in order now.    -----  Past Medical History:   Diagnosis Date    Acute hypoxemic respiratory failure due to COVID-19 ([facility]) 2021    AR (allergic rhinitis) 2009    Diabetes ([facility])     Dr. Mora    History of vascular access device 2020    4f single picc placed on r cephalic, 47cm at 3cm out placed by YAMILKA Aj, difficult limited access    Hypercholesterolemia     Hypertension     Obesity     SIRS (systemic inflammatory response syndrome) ([facility]) 8/15/2020      Past Surgical History:   Procedure Laterality Date    CARDIAC CATHETERIZATION  2007    normal    TONSILLECTOMY  1968      Social History     Tobacco Use    Smoking status: Former     Current packs/day: 0.00     Average packs/day: 1 pack/day for 10.0 years (10.0 ttl pk-yrs)     Types: Cigarettes     Quit date: 3/12/1991     Years since quittin.1    Smokeless tobacco: Never    Tobacco comments:     Quit when I was 30   Substance Use Topics    Alcohol

## 2025-05-22 DIAGNOSIS — D68.59 HYPERCOAGULABLE STATE: ICD-10-CM

## 2025-05-22 RX ORDER — ALFUZOSIN HYDROCHLORIDE 10 MG/1
10 TABLET, EXTENDED RELEASE ORAL DAILY
Qty: 90 TABLET | Refills: 0 | Status: SHIPPED | OUTPATIENT
Start: 2025-05-22

## 2025-05-22 RX ORDER — APIXABAN 5 MG/1
TABLET, FILM COATED ORAL
Qty: 180 TABLET | Refills: 3 | Status: SHIPPED | OUTPATIENT
Start: 2025-05-22

## 2025-08-01 RX ORDER — ALFUZOSIN HYDROCHLORIDE 10 MG/1
10 TABLET, EXTENDED RELEASE ORAL DAILY
Qty: 90 TABLET | Refills: 3 | Status: SHIPPED | OUTPATIENT
Start: 2025-08-01

## 2025-08-28 ENCOUNTER — OFFICE VISIT (OUTPATIENT)
Facility: CLINIC | Age: 65
End: 2025-08-28
Payer: COMMERCIAL

## 2025-08-28 VITALS
WEIGHT: 315 LBS | HEART RATE: 68 BPM | TEMPERATURE: 98 F | OXYGEN SATURATION: 98 % | BODY MASS INDEX: 40.43 KG/M2 | SYSTOLIC BLOOD PRESSURE: 130 MMHG | HEIGHT: 74 IN | RESPIRATION RATE: 18 BRPM | DIASTOLIC BLOOD PRESSURE: 68 MMHG

## 2025-08-28 DIAGNOSIS — E11.65 TYPE 2 DIABETES MELLITUS WITH HYPERGLYCEMIA, WITH LONG-TERM CURRENT USE OF INSULIN (HCC): ICD-10-CM

## 2025-08-28 DIAGNOSIS — I89.0 LYMPHEDEMA OF RIGHT LOWER EXTREMITY: Primary | ICD-10-CM

## 2025-08-28 DIAGNOSIS — Z79.4 TYPE 2 DIABETES MELLITUS WITH HYPERGLYCEMIA, WITH LONG-TERM CURRENT USE OF INSULIN (HCC): ICD-10-CM

## 2025-08-28 LAB
ALBUMIN SERPL-MCNC: 4 G/DL (ref 3.5–5.2)
ALBUMIN/GLOB SERPL: 1.2 (ref 1.1–2.2)
ALP SERPL-CCNC: 95 U/L (ref 40–129)
ALT SERPL-CCNC: 34 U/L (ref 10–50)
ANION GAP SERPL CALC-SCNC: 13 MMOL/L (ref 2–14)
AST SERPL-CCNC: 32 U/L (ref 10–50)
BILIRUB SERPL-MCNC: 1.2 MG/DL (ref 0–1.2)
BUN SERPL-MCNC: 16 MG/DL (ref 8–23)
BUN/CREAT SERPL: 18 (ref 12–20)
CALCIUM SERPL-MCNC: 9.4 MG/DL (ref 8.8–10.2)
CHLORIDE SERPL-SCNC: 98 MMOL/L (ref 98–107)
CO2 SERPL-SCNC: 30 MMOL/L (ref 20–29)
CREAT SERPL-MCNC: 0.92 MG/DL (ref 0.7–1.2)
EST. AVERAGE GLUCOSE BLD GHB EST-MCNC: 145 MG/DL
GLOBULIN SER CALC-MCNC: 3.2 G/DL (ref 2–4)
GLUCOSE SERPL-MCNC: 77 MG/DL (ref 65–100)
HBA1C MFR BLD: 6.7 % (ref 4–5.6)
POTASSIUM SERPL-SCNC: 3 MMOL/L (ref 3.5–5.1)
PROT SERPL-MCNC: 7.2 G/DL (ref 6.4–8.3)
SODIUM SERPL-SCNC: 141 MMOL/L (ref 136–145)

## 2025-08-28 PROCEDURE — 99203 OFFICE O/P NEW LOW 30 MIN: CPT

## 2025-08-28 PROCEDURE — 3075F SYST BP GE 130 - 139MM HG: CPT

## 2025-08-28 PROCEDURE — 3044F HG A1C LEVEL LT 7.0%: CPT

## 2025-08-28 PROCEDURE — 3078F DIAST BP <80 MM HG: CPT

## 2025-08-28 RX ORDER — ICOSAPENT ETHYL 1 G/1
1 CAPSULE ORAL 2 TIMES DAILY
COMMUNITY
Start: 2025-08-08

## 2025-08-28 RX ORDER — TIRZEPATIDE 15 MG/.5ML
15 INJECTION, SOLUTION SUBCUTANEOUS WEEKLY
COMMUNITY

## 2025-08-28 SDOH — ECONOMIC STABILITY: FOOD INSECURITY: WITHIN THE PAST 12 MONTHS, THE FOOD YOU BOUGHT JUST DIDN'T LAST AND YOU DIDN'T HAVE MONEY TO GET MORE.: NEVER TRUE

## 2025-08-28 SDOH — ECONOMIC STABILITY: FOOD INSECURITY: WITHIN THE PAST 12 MONTHS, YOU WORRIED THAT YOUR FOOD WOULD RUN OUT BEFORE YOU GOT MONEY TO BUY MORE.: NEVER TRUE

## 2025-08-28 ASSESSMENT — ENCOUNTER SYMPTOMS
SINUS PAIN: 0
COUGH: 0
SHORTNESS OF BREATH: 0
DIARRHEA: 0
EYES NEGATIVE: 1
CONSTIPATION: 0
RHINORRHEA: 0
SORE THROAT: 0
ALLERGIC/IMMUNOLOGIC NEGATIVE: 1
ABDOMINAL PAIN: 0

## 2025-08-28 ASSESSMENT — PATIENT HEALTH QUESTIONNAIRE - PHQ9
1. LITTLE INTEREST OR PLEASURE IN DOING THINGS: NOT AT ALL
SUM OF ALL RESPONSES TO PHQ QUESTIONS 1-9: 0
SUM OF ALL RESPONSES TO PHQ QUESTIONS 1-9: 0
2. FEELING DOWN, DEPRESSED OR HOPELESS: NOT AT ALL
SUM OF ALL RESPONSES TO PHQ QUESTIONS 1-9: 0
SUM OF ALL RESPONSES TO PHQ QUESTIONS 1-9: 0

## 2025-08-29 ENCOUNTER — RESULTS FOLLOW-UP (OUTPATIENT)
Facility: CLINIC | Age: 65
End: 2025-08-29